# Patient Record
Sex: MALE | Race: WHITE | NOT HISPANIC OR LATINO | URBAN - METROPOLITAN AREA
[De-identification: names, ages, dates, MRNs, and addresses within clinical notes are randomized per-mention and may not be internally consistent; named-entity substitution may affect disease eponyms.]

---

## 2018-01-01 ENCOUNTER — INPATIENT (INPATIENT)
Facility: HOSPITAL | Age: 68
LOS: 11 days | End: 2018-03-20
Attending: INTERNAL MEDICINE

## 2018-01-01 VITALS — OXYGEN SATURATION: 95 % | HEART RATE: 116 BPM

## 2018-01-01 VITALS — RESPIRATION RATE: 6 BRPM

## 2018-01-01 DIAGNOSIS — J18.9 PNEUMONIA, UNSPECIFIED ORGANISM: ICD-10-CM

## 2018-01-01 DIAGNOSIS — N17.9 ACUTE KIDNEY FAILURE, UNSPECIFIED: ICD-10-CM

## 2018-01-01 DIAGNOSIS — C90.00 MULTIPLE MYELOMA NOT HAVING ACHIEVED REMISSION: ICD-10-CM

## 2018-01-01 DIAGNOSIS — D69.6 THROMBOCYTOPENIA, UNSPECIFIED: ICD-10-CM

## 2018-01-01 DIAGNOSIS — D64.9 ANEMIA, UNSPECIFIED: ICD-10-CM

## 2018-01-01 LAB
% ALBUMIN: 25.2 % — SIGNIFICANT CHANGE UP
% ALPHA 1: 6.5 % — SIGNIFICANT CHANGE UP
% ALPHA 2: 9.9 % — SIGNIFICANT CHANGE UP
% BETA: 47.4 % — SIGNIFICANT CHANGE UP
% GAMMA: 11 % — SIGNIFICANT CHANGE UP
% M SPIKE: 37 % — SIGNIFICANT CHANGE UP
ALBUMIN SERPL ELPH-MCNC: 1.4 G/DL — LOW (ref 3–5.5)
ALBUMIN SERPL ELPH-MCNC: 1.5 G/DL — LOW (ref 3–5.5)
ALBUMIN SERPL ELPH-MCNC: 1.5 G/DL — LOW (ref 3–5.5)
ALBUMIN SERPL ELPH-MCNC: 1.6 G/DL — LOW (ref 3–5.5)
ALBUMIN SERPL ELPH-MCNC: 1.6 G/DL — LOW (ref 3–5.5)
ALBUMIN SERPL ELPH-MCNC: 1.7 G/DL — LOW (ref 3.5–5.2)
ALBUMIN SERPL ELPH-MCNC: 1.7 G/DL — LOW (ref 3.5–5.2)
ALBUMIN SERPL ELPH-MCNC: 1.8 G/DL — LOW (ref 3.5–5.2)
ALBUMIN SERPL ELPH-MCNC: 1.8 G/DL — LOW (ref 3.5–5.2)
ALBUMIN SERPL ELPH-MCNC: 1.8 G/DL — LOW (ref 3–5.5)
ALBUMIN SERPL ELPH-MCNC: 1.9 G/DL — LOW (ref 3.5–5.2)
ALBUMIN SERPL ELPH-MCNC: 2 G/DL — LOW (ref 3.5–5.2)
ALBUMIN SERPL ELPH-MCNC: 2 G/DL — LOW (ref 3.6–5.5)
ALBUMIN SERPL ELPH-MCNC: 2.3 G/DL — LOW (ref 3–5.5)
ALBUMIN/GLOB SERPL ELPH: 0.3 RATIO — SIGNIFICANT CHANGE UP
ALP SERPL-CCNC: 32 U/L — SIGNIFICANT CHANGE UP (ref 30–115)
ALP SERPL-CCNC: 35 U/L — SIGNIFICANT CHANGE UP (ref 30–115)
ALP SERPL-CCNC: 44 U/L — SIGNIFICANT CHANGE UP (ref 30–115)
ALP SERPL-CCNC: 46 U/L — SIGNIFICANT CHANGE UP (ref 30–115)
ALP SERPL-CCNC: 49 U/L — SIGNIFICANT CHANGE UP (ref 30–115)
ALP SERPL-CCNC: 51 U/L — SIGNIFICANT CHANGE UP (ref 30–115)
ALP SERPL-CCNC: 57 U/L — SIGNIFICANT CHANGE UP (ref 30–115)
ALP SERPL-CCNC: 58 U/L — SIGNIFICANT CHANGE UP (ref 30–115)
ALP SERPL-CCNC: 58 U/L — SIGNIFICANT CHANGE UP (ref 30–115)
ALP SERPL-CCNC: 67 U/L — SIGNIFICANT CHANGE UP (ref 30–115)
ALP SERPL-CCNC: 68 U/L — SIGNIFICANT CHANGE UP (ref 30–115)
ALP SERPL-CCNC: 70 U/L — SIGNIFICANT CHANGE UP (ref 30–115)
ALP SERPL-CCNC: 72 U/L — SIGNIFICANT CHANGE UP (ref 30–115)
ALPHA1 GLOB SERPL ELPH-MCNC: 0.5 G/DL — HIGH (ref 0.1–0.4)
ALPHA2 GLOB SERPL ELPH-MCNC: 0.8 G/DL — SIGNIFICANT CHANGE UP (ref 0.5–1)
ALT FLD-CCNC: 10 U/L — SIGNIFICANT CHANGE UP (ref 0–41)
ALT FLD-CCNC: 12 U/L — SIGNIFICANT CHANGE UP (ref 0–41)
ALT FLD-CCNC: 13 U/L — SIGNIFICANT CHANGE UP (ref 0–41)
ALT FLD-CCNC: 14 U/L — SIGNIFICANT CHANGE UP (ref 0–41)
ALT FLD-CCNC: 15 U/L — SIGNIFICANT CHANGE UP (ref 0–41)
ALT FLD-CCNC: 22 U/L — SIGNIFICANT CHANGE UP (ref 0–41)
ALT FLD-CCNC: 26 U/L — SIGNIFICANT CHANGE UP (ref 0–41)
ALT FLD-CCNC: 28 U/L — SIGNIFICANT CHANGE UP (ref 0–41)
ALT FLD-CCNC: 30 U/L — SIGNIFICANT CHANGE UP (ref 0–41)
ALT FLD-CCNC: 31 U/L — SIGNIFICANT CHANGE UP (ref 0–41)
ALT FLD-CCNC: 35 U/L — SIGNIFICANT CHANGE UP (ref 0–41)
ANION GAP SERPL CALC-SCNC: 15 MMOL/L — HIGH (ref 7–14)
ANION GAP SERPL CALC-SCNC: 17 MMOL/L — HIGH (ref 7–14)
ANION GAP SERPL CALC-SCNC: 17 MMOL/L — HIGH (ref 7–14)
ANION GAP SERPL CALC-SCNC: 18 MMOL/L — HIGH (ref 7–14)
ANION GAP SERPL CALC-SCNC: 19 MMOL/L — HIGH (ref 7–14)
ANION GAP SERPL CALC-SCNC: 20 MMOL/L — HIGH (ref 7–14)
ANION GAP SERPL CALC-SCNC: 20 MMOL/L — HIGH (ref 7–14)
ANION GAP SERPL CALC-SCNC: 21 MMOL/L — HIGH (ref 7–14)
ANION GAP SERPL CALC-SCNC: 21 MMOL/L — HIGH (ref 7–14)
ANION GAP SERPL CALC-SCNC: 22 MMOL/L — HIGH (ref 7–14)
ANION GAP SERPL CALC-SCNC: 25 MMOL/L — HIGH (ref 7–14)
ANION GAP SERPL CALC-SCNC: 25 MMOL/L — HIGH (ref 7–14)
ANION GAP SERPL CALC-SCNC: 27 MMOL/L — HIGH (ref 7–14)
APPEARANCE UR: (no result)
APPEARANCE UR: (no result)
APTT BLD: 40.3 SEC — HIGH (ref 27–39.2)
APTT BLD: 43.3 SEC — HIGH (ref 27–39.2)
APTT BLD: 43.4 SEC — HIGH (ref 27–39.2)
APTT BLD: 44.6 SEC — HIGH (ref 27–39.2)
APTT BLD: 45 SEC — HIGH (ref 27–39.2)
APTT BLD: 51.3 SEC — HIGH (ref 27–39.2)
APTT BLD: 55.6 SEC — HIGH (ref 27–39.2)
AST SERPL-CCNC: 16 U/L — SIGNIFICANT CHANGE UP (ref 0–41)
AST SERPL-CCNC: 19 U/L — SIGNIFICANT CHANGE UP (ref 0–41)
AST SERPL-CCNC: 19 U/L — SIGNIFICANT CHANGE UP (ref 0–41)
AST SERPL-CCNC: 20 U/L — SIGNIFICANT CHANGE UP (ref 0–41)
AST SERPL-CCNC: 23 U/L — SIGNIFICANT CHANGE UP (ref 0–41)
AST SERPL-CCNC: 26 U/L — SIGNIFICANT CHANGE UP (ref 0–41)
AST SERPL-CCNC: 30 U/L — SIGNIFICANT CHANGE UP (ref 0–41)
AST SERPL-CCNC: 33 U/L — SIGNIFICANT CHANGE UP (ref 0–41)
AST SERPL-CCNC: 34 U/L — SIGNIFICANT CHANGE UP (ref 0–41)
AST SERPL-CCNC: 44 U/L — HIGH (ref 0–41)
AST SERPL-CCNC: 48 U/L — HIGH (ref 0–41)
AST SERPL-CCNC: 52 U/L — HIGH (ref 0–41)
AST SERPL-CCNC: 65 U/L — HIGH (ref 0–41)
B-GLOBULIN SERPL ELPH-MCNC: 3.7 G/DL — HIGH (ref 0.5–1)
B-TYPE NATRIURETIC PEPTIDE BNP RESULT: 623 PG/ML — HIGH (ref 0–99)
BACTERIA # UR AUTO: (no result) /HPF
BASE EXCESS BLDA CALC-SCNC: -0.4 MMOL/L — SIGNIFICANT CHANGE UP (ref -2–2)
BASE EXCESS BLDA CALC-SCNC: -3.4 MMOL/L — LOW (ref -2–2)
BASE EXCESS BLDA CALC-SCNC: -3.8 MMOL/L — LOW (ref -2–2)
BASE EXCESS BLDA CALC-SCNC: -4.2 MMOL/L — LOW (ref -2–2)
BASE EXCESS BLDA CALC-SCNC: -5.4 MMOL/L — LOW (ref -2–2)
BASE EXCESS BLDA CALC-SCNC: -5.4 MMOL/L — LOW (ref -2–2)
BASE EXCESS BLDA CALC-SCNC: -7.1 MMOL/L — LOW (ref -2–2)
BASE EXCESS BLDA CALC-SCNC: -7.5 MMOL/L — LOW (ref -2–2)
BASE EXCESS BLDA CALC-SCNC: -9.4 MMOL/L — LOW (ref -2–2)
BASE EXCESS BLDA CALC-SCNC: 1.4 MMOL/L — SIGNIFICANT CHANGE UP (ref -2–2)
BASOPHILS # BLD AUTO: 0 K/UL — SIGNIFICANT CHANGE UP (ref 0–0.2)
BASOPHILS # BLD AUTO: 0.01 K/UL — SIGNIFICANT CHANGE UP (ref 0–0.2)
BASOPHILS # BLD AUTO: 0.02 K/UL — SIGNIFICANT CHANGE UP (ref 0–0.2)
BASOPHILS # BLD AUTO: 0.03 K/UL — SIGNIFICANT CHANGE UP (ref 0–0.2)
BASOPHILS # BLD AUTO: 0.05 K/UL — SIGNIFICANT CHANGE UP (ref 0–0.2)
BASOPHILS # BLD AUTO: 0.07 K/UL — SIGNIFICANT CHANGE UP (ref 0–0.2)
BASOPHILS # BLD AUTO: 0.1 K/UL — SIGNIFICANT CHANGE UP (ref 0–0.2)
BASOPHILS NFR BLD AUTO: 0 % — SIGNIFICANT CHANGE UP (ref 0–1)
BASOPHILS NFR BLD AUTO: 0.1 % — SIGNIFICANT CHANGE UP (ref 0–1)
BASOPHILS NFR BLD AUTO: 0.2 % — SIGNIFICANT CHANGE UP (ref 0–1)
BASOPHILS NFR BLD AUTO: 0.3 % — SIGNIFICANT CHANGE UP (ref 0–1)
BASOPHILS NFR BLD AUTO: 0.6 % — SIGNIFICANT CHANGE UP (ref 0–1)
BASOPHILS NFR BLD AUTO: 0.6 % — SIGNIFICANT CHANGE UP (ref 0–1)
BILIRUB DIRECT SERPL-MCNC: 0.2 MG/DL — SIGNIFICANT CHANGE UP (ref 0–0.2)
BILIRUB INDIRECT FLD-MCNC: 0.8 MG/DL — SIGNIFICANT CHANGE UP
BILIRUB SERPL-MCNC: 0.5 MG/DL — SIGNIFICANT CHANGE UP (ref 0.2–1.2)
BILIRUB SERPL-MCNC: 0.6 MG/DL — SIGNIFICANT CHANGE UP (ref 0.2–1.2)
BILIRUB SERPL-MCNC: 0.8 MG/DL — SIGNIFICANT CHANGE UP (ref 0.2–1.2)
BILIRUB SERPL-MCNC: 1 MG/DL — SIGNIFICANT CHANGE UP (ref 0.2–1.2)
BILIRUB SERPL-MCNC: 1 MG/DL — SIGNIFICANT CHANGE UP (ref 0.2–1.2)
BILIRUB SERPL-MCNC: 1.1 MG/DL — SIGNIFICANT CHANGE UP (ref 0.2–1.2)
BILIRUB SERPL-MCNC: 1.3 MG/DL — HIGH (ref 0.2–1.2)
BILIRUB SERPL-MCNC: 1.5 MG/DL — HIGH (ref 0.2–1.2)
BILIRUB SERPL-MCNC: 1.6 MG/DL — HIGH (ref 0.2–1.2)
BILIRUB SERPL-MCNC: 1.9 MG/DL — HIGH (ref 0.2–1.2)
BILIRUB SERPL-MCNC: 2.1 MG/DL — HIGH (ref 0.2–1.2)
BILIRUB UR-MCNC: (no result)
BILIRUB UR-MCNC: NEGATIVE — SIGNIFICANT CHANGE UP
BLD GP AB SCN SERPL QL: SIGNIFICANT CHANGE UP
BLD GP AB SCN SERPL QL: SIGNIFICANT CHANGE UP
BUN SERPL-MCNC: 103 MG/DL — CRITICAL HIGH (ref 10–20)
BUN SERPL-MCNC: 111 MG/DL — CRITICAL HIGH (ref 10–20)
BUN SERPL-MCNC: 111 MG/DL — CRITICAL HIGH (ref 10–20)
BUN SERPL-MCNC: 112 MG/DL — CRITICAL HIGH (ref 10–20)
BUN SERPL-MCNC: 118 MG/DL — CRITICAL HIGH (ref 10–20)
BUN SERPL-MCNC: 124 MG/DL — CRITICAL HIGH (ref 10–20)
BUN SERPL-MCNC: 125 MG/DL — CRITICAL HIGH (ref 10–20)
BUN SERPL-MCNC: 126 MG/DL — CRITICAL HIGH (ref 10–20)
BUN SERPL-MCNC: 126 MG/DL — CRITICAL HIGH (ref 10–20)
BUN SERPL-MCNC: 136 MG/DL — CRITICAL HIGH (ref 10–20)
BUN SERPL-MCNC: 138 MG/DL — CRITICAL HIGH (ref 10–20)
BUN SERPL-MCNC: 145 MG/DL — CRITICAL HIGH (ref 10–20)
BUN SERPL-MCNC: 147 MG/DL — CRITICAL HIGH (ref 10–20)
BUN SERPL-MCNC: 159 MG/DL — CRITICAL HIGH (ref 10–20)
BUN SERPL-MCNC: 196 MG/DL — CRITICAL HIGH (ref 10–20)
BUN SERPL-MCNC: 79 MG/DL — CRITICAL HIGH (ref 10–20)
BUN SERPL-MCNC: >112 MG/DL — CRITICAL HIGH (ref 10–20)
CALCIUM SERPL-MCNC: 10.6 MG/DL — HIGH (ref 8.5–10.1)
CALCIUM SERPL-MCNC: 6.4 MG/DL — LOW (ref 8.5–10.1)
CALCIUM SERPL-MCNC: 6.5 MG/DL — LOW (ref 8.5–10.1)
CALCIUM SERPL-MCNC: 6.6 MG/DL — LOW (ref 8.5–10.1)
CALCIUM SERPL-MCNC: 6.9 MG/DL — LOW (ref 8.5–10.1)
CALCIUM SERPL-MCNC: 7 MG/DL — LOW (ref 8.5–10.1)
CALCIUM SERPL-MCNC: 7.2 MG/DL — LOW (ref 8.5–10.1)
CALCIUM SERPL-MCNC: 7.2 MG/DL — LOW (ref 8.5–10.1)
CALCIUM SERPL-MCNC: 7.4 MG/DL — LOW (ref 8.5–10.1)
CALCIUM SERPL-MCNC: 7.5 MG/DL — LOW (ref 8.5–10.1)
CALCIUM SERPL-MCNC: 7.6 MG/DL — LOW (ref 8.5–10.1)
CALCIUM SERPL-MCNC: 8.4 MG/DL — LOW (ref 8.5–10.1)
CALCIUM SERPL-MCNC: 8.8 MG/DL — SIGNIFICANT CHANGE UP (ref 8.5–10.1)
CALCIUM SERPL-MCNC: 9.1 MG/DL — SIGNIFICANT CHANGE UP (ref 8.5–10.1)
CALCIUM SERPL-MCNC: 9.5 MG/DL — SIGNIFICANT CHANGE UP (ref 8.5–10.1)
CALCIUM SERPL-MCNC: 9.7 MG/DL — SIGNIFICANT CHANGE UP (ref 8.5–10.1)
CALCIUM SERPL-MCNC: 9.9 MG/DL — SIGNIFICANT CHANGE UP (ref 8.5–10.1)
CHLORIDE SERPL-SCNC: 100 MMOL/L — SIGNIFICANT CHANGE UP (ref 98–110)
CHLORIDE SERPL-SCNC: 100 MMOL/L — SIGNIFICANT CHANGE UP (ref 98–110)
CHLORIDE SERPL-SCNC: 101 MMOL/L — SIGNIFICANT CHANGE UP (ref 98–110)
CHLORIDE SERPL-SCNC: 103 MMOL/L — SIGNIFICANT CHANGE UP (ref 98–110)
CHLORIDE SERPL-SCNC: 106 MMOL/L — SIGNIFICANT CHANGE UP (ref 98–110)
CHLORIDE SERPL-SCNC: 93 MMOL/L — LOW (ref 98–110)
CHLORIDE SERPL-SCNC: 94 MMOL/L — LOW (ref 98–110)
CHLORIDE SERPL-SCNC: 95 MMOL/L — LOW (ref 98–110)
CHLORIDE SERPL-SCNC: 96 MMOL/L — LOW (ref 98–110)
CHLORIDE SERPL-SCNC: 97 MMOL/L — LOW (ref 98–110)
CHLORIDE SERPL-SCNC: 97 MMOL/L — LOW (ref 98–110)
CHLORIDE SERPL-SCNC: 98 MMOL/L — SIGNIFICANT CHANGE UP (ref 98–110)
CK MB BLD-MCNC: 1 % — SIGNIFICANT CHANGE UP (ref 0–4)
CK MB CFR SERPL CALC: 3.4 NG/ML — SIGNIFICANT CHANGE UP (ref 0.6–6.3)
CK SERPL-CCNC: 157 U/L — SIGNIFICANT CHANGE UP (ref 0–225)
CK SERPL-CCNC: 280 U/L — HIGH (ref 0–225)
CO2 SERPL-SCNC: 15 MMOL/L — LOW (ref 17–32)
CO2 SERPL-SCNC: 15 MMOL/L — LOW (ref 17–32)
CO2 SERPL-SCNC: 17 MMOL/L — SIGNIFICANT CHANGE UP (ref 17–32)
CO2 SERPL-SCNC: 17 MMOL/L — SIGNIFICANT CHANGE UP (ref 17–32)
CO2 SERPL-SCNC: 18 MMOL/L — SIGNIFICANT CHANGE UP (ref 17–32)
CO2 SERPL-SCNC: 18 MMOL/L — SIGNIFICANT CHANGE UP (ref 17–32)
CO2 SERPL-SCNC: 19 MMOL/L — SIGNIFICANT CHANGE UP (ref 17–32)
CO2 SERPL-SCNC: 20 MMOL/L — SIGNIFICANT CHANGE UP (ref 17–32)
CO2 SERPL-SCNC: 21 MMOL/L — SIGNIFICANT CHANGE UP (ref 17–32)
CO2 SERPL-SCNC: 22 MMOL/L — SIGNIFICANT CHANGE UP (ref 17–32)
CO2 SERPL-SCNC: 23 MMOL/L — SIGNIFICANT CHANGE UP (ref 17–32)
CO2 SERPL-SCNC: 24 MMOL/L — SIGNIFICANT CHANGE UP (ref 17–32)
COLOR SPEC: (no result)
COLOR SPEC: SIGNIFICANT CHANGE UP
CREAT SERPL-MCNC: 5.6 MG/DL — CRITICAL HIGH (ref 0.7–1.5)
CREAT SERPL-MCNC: 6.1 MG/DL — CRITICAL HIGH (ref 0.7–1.5)
CREAT SERPL-MCNC: 6.6 MG/DL — CRITICAL HIGH (ref 0.7–1.5)
CREAT SERPL-MCNC: 6.9 MG/DL — CRITICAL HIGH (ref 0.7–1.5)
CREAT SERPL-MCNC: 7 MG/DL — CRITICAL HIGH (ref 0.7–1.5)
CREAT SERPL-MCNC: 7.2 MG/DL — CRITICAL HIGH (ref 0.7–1.5)
CREAT SERPL-MCNC: 7.2 MG/DL — CRITICAL HIGH (ref 0.7–1.5)
CREAT SERPL-MCNC: 7.4 MG/DL — CRITICAL HIGH (ref 0.7–1.5)
CREAT SERPL-MCNC: 7.5 MG/DL — CRITICAL HIGH (ref 0.7–1.5)
CREAT SERPL-MCNC: 7.5 MG/DL — CRITICAL HIGH (ref 0.7–1.5)
CREAT SERPL-MCNC: 7.6 MG/DL — CRITICAL HIGH (ref 0.7–1.5)
CREAT SERPL-MCNC: 8.1 MG/DL — CRITICAL HIGH (ref 0.7–1.5)
CREAT SERPL-MCNC: 8.2 MG/DL — CRITICAL HIGH (ref 0.7–1.5)
CREAT SERPL-MCNC: 8.3 MG/DL — CRITICAL HIGH (ref 0.7–1.5)
CREAT SERPL-MCNC: 8.5 MG/DL — CRITICAL HIGH (ref 0.7–1.5)
CREAT SERPL-MCNC: 8.9 MG/DL — CRITICAL HIGH (ref 0.7–1.5)
CREAT SERPL-MCNC: 9.3 MG/DL — CRITICAL HIGH (ref 0.7–1.5)
CULTURE RESULTS: NO GROWTH — SIGNIFICANT CHANGE UP
CULTURE RESULTS: SIGNIFICANT CHANGE UP
D DIMER BLD IA.RAPID-MCNC: 1490 NG/ML DDU — HIGH (ref 0–230)
D DIMER BLD IA.RAPID-MCNC: 6189 NG/ML DDU — HIGH (ref 0–230)
DIFF PNL FLD: (no result)
DIFF PNL FLD: NEGATIVE — SIGNIFICANT CHANGE UP
EOSINOPHIL # BLD AUTO: 0 K/UL — SIGNIFICANT CHANGE UP (ref 0–0.7)
EOSINOPHIL # BLD AUTO: 0.01 K/UL — SIGNIFICANT CHANGE UP (ref 0–0.7)
EOSINOPHIL # BLD AUTO: 0.04 K/UL — SIGNIFICANT CHANGE UP (ref 0–0.7)
EOSINOPHIL # BLD AUTO: 0.05 K/UL — SIGNIFICANT CHANGE UP (ref 0–0.7)
EOSINOPHIL # BLD AUTO: 0.07 K/UL — SIGNIFICANT CHANGE UP (ref 0–0.7)
EOSINOPHIL # BLD AUTO: 0.08 K/UL — SIGNIFICANT CHANGE UP (ref 0–0.7)
EOSINOPHIL NFR BLD AUTO: 0 % — SIGNIFICANT CHANGE UP (ref 0–8)
EOSINOPHIL NFR BLD AUTO: 0.1 % — SIGNIFICANT CHANGE UP (ref 0–8)
EOSINOPHIL NFR BLD AUTO: 0.5 % — SIGNIFICANT CHANGE UP (ref 0–8)
EOSINOPHIL NFR BLD AUTO: 0.5 % — SIGNIFICANT CHANGE UP (ref 0–8)
EOSINOPHIL NFR BLD AUTO: 0.7 % — SIGNIFICANT CHANGE UP (ref 0–8)
EOSINOPHIL NFR BLD AUTO: 1.1 % — SIGNIFICANT CHANGE UP (ref 0–8)
EOSINOPHIL NFR BLD AUTO: 4 % — SIGNIFICANT CHANGE UP (ref 0–8)
EPI CELLS # UR: (no result) /HPF
FERRITIN SERPL-MCNC: 1559 NG/ML — HIGH (ref 30–400)
FIBRINOGEN PPP-MCNC: 297 MG/DL — SIGNIFICANT CHANGE UP (ref 204.4–570.6)
FIBRINOGEN PPP-MCNC: 567 MG/DL — SIGNIFICANT CHANGE UP (ref 204.4–570.6)
FLU A RESULT: NEGATIVE — SIGNIFICANT CHANGE UP
FLU A RESULT: NEGATIVE — SIGNIFICANT CHANGE UP
FLUAV AG NPH QL: NEGATIVE — SIGNIFICANT CHANGE UP
FLUBV AG NPH QL: NEGATIVE — SIGNIFICANT CHANGE UP
FOLATE SERPL-MCNC: 13.9 NG/ML — SIGNIFICANT CHANGE UP (ref 4.8–24.2)
GAMMA GLOBULIN: 0.9 G/DL — SIGNIFICANT CHANGE UP (ref 0.6–1.6)
GAS PNL BLDA: SIGNIFICANT CHANGE UP
GAS PNL BLDA: SIGNIFICANT CHANGE UP
GAS PNL BLDV: SIGNIFICANT CHANGE UP
GLUCOSE SERPL-MCNC: 101 MG/DL — SIGNIFICANT CHANGE UP (ref 70–110)
GLUCOSE SERPL-MCNC: 104 MG/DL — SIGNIFICANT CHANGE UP (ref 70–110)
GLUCOSE SERPL-MCNC: 106 MG/DL — SIGNIFICANT CHANGE UP (ref 70–110)
GLUCOSE SERPL-MCNC: 120 MG/DL — HIGH (ref 70–110)
GLUCOSE SERPL-MCNC: 126 MG/DL — HIGH (ref 70–110)
GLUCOSE SERPL-MCNC: 129 MG/DL — HIGH (ref 70–110)
GLUCOSE SERPL-MCNC: 131 MG/DL — HIGH (ref 70–110)
GLUCOSE SERPL-MCNC: 147 MG/DL — HIGH (ref 70–110)
GLUCOSE SERPL-MCNC: 149 MG/DL — HIGH (ref 70–110)
GLUCOSE SERPL-MCNC: 72 MG/DL — SIGNIFICANT CHANGE UP (ref 70–110)
GLUCOSE SERPL-MCNC: 80 MG/DL — SIGNIFICANT CHANGE UP (ref 70–110)
GLUCOSE SERPL-MCNC: 85 MG/DL — SIGNIFICANT CHANGE UP (ref 70–110)
GLUCOSE SERPL-MCNC: 87 MG/DL — SIGNIFICANT CHANGE UP (ref 70–110)
GLUCOSE SERPL-MCNC: 94 MG/DL — SIGNIFICANT CHANGE UP (ref 70–110)
GLUCOSE SERPL-MCNC: 95 MG/DL — SIGNIFICANT CHANGE UP (ref 70–110)
GLUCOSE SERPL-MCNC: 97 MG/DL — SIGNIFICANT CHANGE UP (ref 70–110)
GLUCOSE SERPL-MCNC: 98 MG/DL — SIGNIFICANT CHANGE UP (ref 70–110)
GLUCOSE UR QL: 100 MG/DL
GLUCOSE UR QL: NEGATIVE MG/DL — SIGNIFICANT CHANGE UP
HAPTOGLOB SERPL-MCNC: 214 MG/DL — HIGH (ref 34–200)
HBV CORE AB SER-ACNC: SIGNIFICANT CHANGE UP
HBV SURFACE AB SER-ACNC: SIGNIFICANT CHANGE UP
HBV SURFACE AG SER-ACNC: SIGNIFICANT CHANGE UP
HCO3 BLDA-SCNC: 17 MMOL/L — LOW (ref 23–27)
HCO3 BLDA-SCNC: 19 MMOL/L — LOW (ref 23–27)
HCO3 BLDA-SCNC: 19 MMOL/L — LOW (ref 23–27)
HCO3 BLDA-SCNC: 20 MMOL/L — LOW (ref 23–27)
HCO3 BLDA-SCNC: 21 MMOL/L — LOW (ref 23–27)
HCO3 BLDA-SCNC: 22 MMOL/L — LOW (ref 23–27)
HCO3 BLDA-SCNC: 22 MMOL/L — LOW (ref 23–27)
HCO3 BLDA-SCNC: 23 MMOL/L — SIGNIFICANT CHANGE UP (ref 23–27)
HCO3 BLDA-SCNC: 24 MMOL/L — SIGNIFICANT CHANGE UP (ref 23–27)
HCO3 BLDA-SCNC: 26 MMOL/L — SIGNIFICANT CHANGE UP (ref 23–27)
HCT VFR BLD CALC: 14.9 % — LOW (ref 42–52)
HCT VFR BLD CALC: 15.3 % — LOW (ref 42–52)
HCT VFR BLD CALC: 16.2 % — LOW (ref 42–52)
HCT VFR BLD CALC: 19.3 % — LOW (ref 42–52)
HCT VFR BLD CALC: 20.1 % — LOW (ref 42–52)
HCT VFR BLD CALC: 20.6 % — LOW (ref 42–52)
HCT VFR BLD CALC: 20.8 % — LOW (ref 42–52)
HCT VFR BLD CALC: 21.1 % — LOW (ref 42–52)
HCT VFR BLD CALC: 21.4 % — LOW (ref 42–52)
HCT VFR BLD CALC: 21.4 % — LOW (ref 42–52)
HCT VFR BLD CALC: 21.8 % — LOW (ref 42–52)
HCT VFR BLD CALC: 22.8 % — LOW (ref 42–52)
HCT VFR BLD CALC: 22.9 % — LOW (ref 42–52)
HCT VFR BLD CALC: 23.4 % — LOW (ref 42–52)
HCT VFR BLD CALC: 23.6 % — LOW (ref 42–52)
HCT VFR BLD CALC: 24.1 % — LOW (ref 42–52)
HCT VFR BLD CALC: 26.3 % — LOW (ref 42–52)
HCV AB S/CO SERPL IA: 0.03 S/CO — SIGNIFICANT CHANGE UP
HCV AB SERPL-IMP: SIGNIFICANT CHANGE UP
HGB BLD-MCNC: 4.4 G/DL — CRITICAL LOW (ref 14–18)
HGB BLD-MCNC: 4.6 G/DL — CRITICAL LOW (ref 14–18)
HGB BLD-MCNC: 5.3 G/DL — CRITICAL LOW (ref 14–18)
HGB BLD-MCNC: 6.1 G/DL — CRITICAL LOW (ref 14–18)
HGB BLD-MCNC: 6.2 G/DL — CRITICAL LOW (ref 14–18)
HGB BLD-MCNC: 6.4 G/DL — CRITICAL LOW (ref 14–18)
HGB BLD-MCNC: 6.6 G/DL — CRITICAL LOW (ref 14–18)
HGB BLD-MCNC: 6.8 G/DL — CRITICAL LOW (ref 14–18)
HGB BLD-MCNC: 6.9 G/DL — CRITICAL LOW (ref 14–18)
HGB BLD-MCNC: 7 G/DL — CRITICAL LOW (ref 14–18)
HGB BLD-MCNC: 7.1 G/DL — CRITICAL LOW (ref 14–18)
HGB BLD-MCNC: 7.3 G/DL — CRITICAL LOW (ref 14–18)
HGB BLD-MCNC: 7.4 G/DL — CRITICAL LOW (ref 14–18)
HGB BLD-MCNC: 7.4 G/DL — CRITICAL LOW (ref 14–18)
HGB BLD-MCNC: 7.7 G/DL — LOW (ref 14–18)
HGB BLD-MCNC: 7.9 G/DL — LOW (ref 14–18)
HGB BLD-MCNC: 8.8 G/DL — LOW (ref 14–18)
HOROWITZ INDEX BLDA+IHG-RTO: 40 — SIGNIFICANT CHANGE UP
HOROWITZ INDEX BLDA+IHG-RTO: 50 — SIGNIFICANT CHANGE UP
IMM GRANULOCYTES NFR BLD AUTO: 1 % — HIGH (ref 0.1–0.3)
IMM GRANULOCYTES NFR BLD AUTO: 1.3 % — HIGH (ref 0.1–0.3)
IMM GRANULOCYTES NFR BLD AUTO: 2 % — HIGH (ref 0.1–0.3)
IMM GRANULOCYTES NFR BLD AUTO: 2.4 % — HIGH (ref 0.1–0.3)
IMM GRANULOCYTES NFR BLD AUTO: 3 % — HIGH (ref 0.1–0.3)
IMM GRANULOCYTES NFR BLD AUTO: 3.1 % — HIGH (ref 0.1–0.3)
IMM GRANULOCYTES NFR BLD AUTO: 3.1 % — HIGH (ref 0.1–0.3)
IMM GRANULOCYTES NFR BLD AUTO: 3.7 % — HIGH (ref 0.1–0.3)
IMM GRANULOCYTES NFR BLD AUTO: 4 % — HIGH (ref 0.1–0.3)
IMM GRANULOCYTES NFR BLD AUTO: 4.6 % — HIGH (ref 0.1–0.3)
IMM GRANULOCYTES NFR BLD AUTO: 4.7 % — HIGH (ref 0.1–0.3)
IMM GRANULOCYTES NFR BLD AUTO: 4.9 % — HIGH (ref 0.1–0.3)
IMM GRANULOCYTES NFR BLD AUTO: 6.1 % — HIGH (ref 0.1–0.3)
IMM GRANULOCYTES NFR BLD AUTO: 6.5 % — HIGH (ref 0.1–0.3)
IMM GRANULOCYTES NFR BLD AUTO: 7.1 % — HIGH (ref 0.1–0.3)
INR BLD: 1.21 RATIO — SIGNIFICANT CHANGE UP (ref 0.65–1.3)
INR BLD: 1.24 RATIO — SIGNIFICANT CHANGE UP (ref 0.65–1.3)
INR BLD: 1.37 RATIO — HIGH (ref 0.65–1.3)
INR BLD: 1.37 RATIO — HIGH (ref 0.65–1.3)
INR BLD: 1.43 RATIO — HIGH (ref 0.65–1.3)
INR BLD: 1.54 RATIO — HIGH (ref 0.65–1.3)
INR BLD: 1.62 RATIO — HIGH (ref 0.65–1.3)
INTERPRETATION SERPL IFE-IMP: SIGNIFICANT CHANGE UP
IRON SATN MFR SERPL: 21 % — SIGNIFICANT CHANGE UP (ref 15–50)
IRON SATN MFR SERPL: 28 UG/DL — LOW (ref 35–150)
KAPPA LC SER QL IFE: 311 MG/DL — HIGH (ref 0.33–1.94)
KAPPA/LAMBDA FREE LIGHT CHAIN RATIO, SERUM: 777.5 RATIO — HIGH (ref 0.26–1.65)
KETONES UR-MCNC: NEGATIVE — SIGNIFICANT CHANGE UP
KETONES UR-MCNC: NEGATIVE — SIGNIFICANT CHANGE UP
LACTATE SERPL-SCNC: 4 MMOL/L — CRITICAL HIGH (ref 0.5–2.2)
LAMBDA LC SER QL IFE: 0.4 MG/DL — LOW (ref 0.57–2.63)
LDH SERPL L TO P-CCNC: 313 U/L — HIGH (ref 60–200)
LEGIONELLA AG UR QL: NEGATIVE — SIGNIFICANT CHANGE UP
LEUKOCYTE ESTERASE UR-ACNC: (no result)
LEUKOCYTE ESTERASE UR-ACNC: NEGATIVE — SIGNIFICANT CHANGE UP
LYMPHOCYTES # BLD AUTO: 0.24 K/UL — LOW (ref 1.2–3.4)
LYMPHOCYTES # BLD AUTO: 0.3 K/UL — LOW (ref 1.2–3.4)
LYMPHOCYTES # BLD AUTO: 0.38 K/UL — LOW (ref 1.2–3.4)
LYMPHOCYTES # BLD AUTO: 0.38 K/UL — LOW (ref 1.2–3.4)
LYMPHOCYTES # BLD AUTO: 0.39 K/UL — LOW (ref 1.2–3.4)
LYMPHOCYTES # BLD AUTO: 0.4 K/UL — LOW (ref 1.2–3.4)
LYMPHOCYTES # BLD AUTO: 0.45 K/UL — LOW (ref 1.2–3.4)
LYMPHOCYTES # BLD AUTO: 0.45 K/UL — LOW (ref 1.2–3.4)
LYMPHOCYTES # BLD AUTO: 0.49 K/UL — LOW (ref 1.2–3.4)
LYMPHOCYTES # BLD AUTO: 0.5 K/UL — LOW (ref 1.2–3.4)
LYMPHOCYTES # BLD AUTO: 0.52 K/UL — LOW (ref 1.2–3.4)
LYMPHOCYTES # BLD AUTO: 0.57 K/UL — LOW (ref 1.2–3.4)
LYMPHOCYTES # BLD AUTO: 0.69 K/UL — LOW (ref 1.2–3.4)
LYMPHOCYTES # BLD AUTO: 0.7 K/UL — LOW (ref 1.2–3.4)
LYMPHOCYTES # BLD AUTO: 0.7 K/UL — LOW (ref 1.2–3.4)
LYMPHOCYTES # BLD AUTO: 0.86 K/UL — LOW (ref 1.2–3.4)
LYMPHOCYTES # BLD AUTO: 2 % — LOW (ref 20.5–51.1)
LYMPHOCYTES # BLD AUTO: 2.2 % — LOW (ref 20.5–51.1)
LYMPHOCYTES # BLD AUTO: 2.4 % — LOW (ref 20.5–51.1)
LYMPHOCYTES # BLD AUTO: 2.5 % — LOW (ref 20.5–51.1)
LYMPHOCYTES # BLD AUTO: 3.1 % — LOW (ref 20.5–51.1)
LYMPHOCYTES # BLD AUTO: 3.1 % — LOW (ref 20.5–51.1)
LYMPHOCYTES # BLD AUTO: 3.3 % — LOW (ref 20.5–51.1)
LYMPHOCYTES # BLD AUTO: 3.7 % — LOW (ref 20.5–51.1)
LYMPHOCYTES # BLD AUTO: 32 % — SIGNIFICANT CHANGE UP (ref 20.5–51.1)
LYMPHOCYTES # BLD AUTO: 4.2 % — LOW (ref 20.5–51.1)
LYMPHOCYTES # BLD AUTO: 4.2 % — LOW (ref 20.5–51.1)
LYMPHOCYTES # BLD AUTO: 4.4 % — LOW (ref 20.5–51.1)
LYMPHOCYTES # BLD AUTO: 5.3 % — LOW (ref 20.5–51.1)
LYMPHOCYTES # BLD AUTO: 5.4 % — LOW (ref 20.5–51.1)
LYMPHOCYTES # BLD AUTO: 7.3 % — LOW (ref 20.5–51.1)
LYMPHOCYTES # BLD AUTO: 9.9 % — LOW (ref 20.5–51.1)
M-SPIKE: 2.9 G/DL — HIGH (ref 0–0)
MAGNESIUM SERPL-MCNC: 2.1 MG/DL — SIGNIFICANT CHANGE UP (ref 1.8–2.4)
MAGNESIUM SERPL-MCNC: 2.2 MG/DL — SIGNIFICANT CHANGE UP (ref 1.8–2.4)
MAGNESIUM SERPL-MCNC: 2.3 MG/DL — SIGNIFICANT CHANGE UP (ref 1.8–2.4)
MAGNESIUM SERPL-MCNC: 2.4 MG/DL — SIGNIFICANT CHANGE UP (ref 1.8–2.4)
MAGNESIUM SERPL-MCNC: 2.6 MG/DL — HIGH (ref 1.8–2.4)
MAGNESIUM SERPL-MCNC: 2.6 MG/DL — HIGH (ref 1.8–2.4)
MAGNESIUM SERPL-MCNC: 2.7 MG/DL — HIGH (ref 1.8–2.4)
MAGNESIUM SERPL-MCNC: 2.8 MG/DL — HIGH (ref 1.8–2.4)
MAGNESIUM SERPL-MCNC: 2.9 MG/DL — HIGH (ref 1.8–2.4)
MAGNESIUM SERPL-MCNC: 3.1 MG/DL — CRITICAL HIGH (ref 1.8–2.4)
MCHC RBC-ENTMCNC: 28.6 PG — SIGNIFICANT CHANGE UP (ref 27–31)
MCHC RBC-ENTMCNC: 28.6 PG — SIGNIFICANT CHANGE UP (ref 27–31)
MCHC RBC-ENTMCNC: 28.7 PG — SIGNIFICANT CHANGE UP (ref 27–31)
MCHC RBC-ENTMCNC: 28.8 PG — SIGNIFICANT CHANGE UP (ref 27–31)
MCHC RBC-ENTMCNC: 28.9 PG — SIGNIFICANT CHANGE UP (ref 27–31)
MCHC RBC-ENTMCNC: 29 PG — SIGNIFICANT CHANGE UP (ref 27–31)
MCHC RBC-ENTMCNC: 29 PG — SIGNIFICANT CHANGE UP (ref 27–31)
MCHC RBC-ENTMCNC: 29.2 PG — SIGNIFICANT CHANGE UP (ref 27–31)
MCHC RBC-ENTMCNC: 29.3 PG — SIGNIFICANT CHANGE UP (ref 27–31)
MCHC RBC-ENTMCNC: 29.3 PG — SIGNIFICANT CHANGE UP (ref 27–31)
MCHC RBC-ENTMCNC: 29.4 PG — SIGNIFICANT CHANGE UP (ref 27–31)
MCHC RBC-ENTMCNC: 29.4 PG — SIGNIFICANT CHANGE UP (ref 27–31)
MCHC RBC-ENTMCNC: 29.5 G/DL — LOW (ref 32–37)
MCHC RBC-ENTMCNC: 30.1 G/DL — LOW (ref 32–37)
MCHC RBC-ENTMCNC: 30.8 G/DL — LOW (ref 32–37)
MCHC RBC-ENTMCNC: 31.1 G/DL — LOW (ref 32–37)
MCHC RBC-ENTMCNC: 31.6 G/DL — LOW (ref 32–37)
MCHC RBC-ENTMCNC: 31.6 G/DL — LOW (ref 32–37)
MCHC RBC-ENTMCNC: 31.7 G/DL — LOW (ref 32–37)
MCHC RBC-ENTMCNC: 31.9 G/DL — LOW (ref 32–37)
MCHC RBC-ENTMCNC: 32.2 G/DL — SIGNIFICANT CHANGE UP (ref 32–37)
MCHC RBC-ENTMCNC: 32.2 G/DL — SIGNIFICANT CHANGE UP (ref 32–37)
MCHC RBC-ENTMCNC: 32.5 G/DL — SIGNIFICANT CHANGE UP (ref 32–37)
MCHC RBC-ENTMCNC: 32.6 G/DL — SIGNIFICANT CHANGE UP (ref 32–37)
MCHC RBC-ENTMCNC: 32.6 G/DL — SIGNIFICANT CHANGE UP (ref 32–37)
MCHC RBC-ENTMCNC: 32.7 G/DL — SIGNIFICANT CHANGE UP (ref 32–37)
MCHC RBC-ENTMCNC: 32.7 G/DL — SIGNIFICANT CHANGE UP (ref 32–37)
MCHC RBC-ENTMCNC: 32.8 G/DL — SIGNIFICANT CHANGE UP (ref 32–37)
MCHC RBC-ENTMCNC: 33.5 G/DL — SIGNIFICANT CHANGE UP (ref 32–37)
MCV RBC AUTO: 86.2 FL — SIGNIFICANT CHANGE UP (ref 80–94)
MCV RBC AUTO: 88 FL — SIGNIFICANT CHANGE UP (ref 80–94)
MCV RBC AUTO: 88 FL — SIGNIFICANT CHANGE UP (ref 80–94)
MCV RBC AUTO: 89.2 FL — SIGNIFICANT CHANGE UP (ref 80–94)
MCV RBC AUTO: 89.9 FL — SIGNIFICANT CHANGE UP (ref 80–94)
MCV RBC AUTO: 90 FL — SIGNIFICANT CHANGE UP (ref 80–94)
MCV RBC AUTO: 90.1 FL — SIGNIFICANT CHANGE UP (ref 80–94)
MCV RBC AUTO: 90.1 FL — SIGNIFICANT CHANGE UP (ref 80–94)
MCV RBC AUTO: 90.2 FL — SIGNIFICANT CHANGE UP (ref 80–94)
MCV RBC AUTO: 90.5 FL — SIGNIFICANT CHANGE UP (ref 80–94)
MCV RBC AUTO: 90.9 FL — SIGNIFICANT CHANGE UP (ref 80–94)
MCV RBC AUTO: 91 FL — SIGNIFICANT CHANGE UP (ref 80–94)
MCV RBC AUTO: 91.8 FL — SIGNIFICANT CHANGE UP (ref 80–94)
MCV RBC AUTO: 92.6 FL — SIGNIFICANT CHANGE UP (ref 80–94)
MCV RBC AUTO: 92.8 FL — SIGNIFICANT CHANGE UP (ref 80–94)
MCV RBC AUTO: 95.6 FL — HIGH (ref 80–94)
MCV RBC AUTO: 97.4 FL — HIGH (ref 80–94)
MONOCYTES # BLD AUTO: 0.11 K/UL — SIGNIFICANT CHANGE UP (ref 0.1–0.6)
MONOCYTES # BLD AUTO: 0.14 K/UL — SIGNIFICANT CHANGE UP (ref 0.1–0.6)
MONOCYTES # BLD AUTO: 0.16 K/UL — SIGNIFICANT CHANGE UP (ref 0.1–0.6)
MONOCYTES # BLD AUTO: 0.18 K/UL — SIGNIFICANT CHANGE UP (ref 0.1–0.6)
MONOCYTES # BLD AUTO: 0.22 K/UL — SIGNIFICANT CHANGE UP (ref 0.1–0.6)
MONOCYTES # BLD AUTO: 0.24 K/UL — SIGNIFICANT CHANGE UP (ref 0.1–0.6)
MONOCYTES # BLD AUTO: 0.25 K/UL — SIGNIFICANT CHANGE UP (ref 0.1–0.6)
MONOCYTES # BLD AUTO: 0.26 K/UL — SIGNIFICANT CHANGE UP (ref 0.1–0.6)
MONOCYTES # BLD AUTO: 0.28 K/UL — SIGNIFICANT CHANGE UP (ref 0.1–0.6)
MONOCYTES # BLD AUTO: 0.29 K/UL — SIGNIFICANT CHANGE UP (ref 0.1–0.6)
MONOCYTES # BLD AUTO: 0.29 K/UL — SIGNIFICANT CHANGE UP (ref 0.1–0.6)
MONOCYTES # BLD AUTO: 0.3 K/UL — SIGNIFICANT CHANGE UP (ref 0.1–0.6)
MONOCYTES # BLD AUTO: 0.33 K/UL — SIGNIFICANT CHANGE UP (ref 0.1–0.6)
MONOCYTES # BLD AUTO: 0.35 K/UL — SIGNIFICANT CHANGE UP (ref 0.1–0.6)
MONOCYTES # BLD AUTO: 0.92 K/UL — HIGH (ref 0.1–0.6)
MONOCYTES NFR BLD AUTO: 0.6 % — LOW (ref 1.7–9.3)
MONOCYTES NFR BLD AUTO: 1.1 % — LOW (ref 1.7–9.3)
MONOCYTES NFR BLD AUTO: 1.1 % — LOW (ref 1.7–9.3)
MONOCYTES NFR BLD AUTO: 1.6 % — LOW (ref 1.7–9.3)
MONOCYTES NFR BLD AUTO: 1.6 % — LOW (ref 1.7–9.3)
MONOCYTES NFR BLD AUTO: 2.1 % — SIGNIFICANT CHANGE UP (ref 1.7–9.3)
MONOCYTES NFR BLD AUTO: 2.1 % — SIGNIFICANT CHANGE UP (ref 1.7–9.3)
MONOCYTES NFR BLD AUTO: 2.2 % — SIGNIFICANT CHANGE UP (ref 1.7–9.3)
MONOCYTES NFR BLD AUTO: 2.5 % — SIGNIFICANT CHANGE UP (ref 1.7–9.3)
MONOCYTES NFR BLD AUTO: 2.5 % — SIGNIFICANT CHANGE UP (ref 1.7–9.3)
MONOCYTES NFR BLD AUTO: 2.8 % — SIGNIFICANT CHANGE UP (ref 1.7–9.3)
MONOCYTES NFR BLD AUTO: 2.9 % — SIGNIFICANT CHANGE UP (ref 1.7–9.3)
MONOCYTES NFR BLD AUTO: 3.1 % — SIGNIFICANT CHANGE UP (ref 1.7–9.3)
MONOCYTES NFR BLD AUTO: 3.8 % — SIGNIFICANT CHANGE UP (ref 1.7–9.3)
MONOCYTES NFR BLD AUTO: 6 % — SIGNIFICANT CHANGE UP (ref 1.7–9.3)
MONOCYTES NFR BLD AUTO: 7.6 % — SIGNIFICANT CHANGE UP (ref 1.7–9.3)
MRSA PCR RESULT.: NEGATIVE — SIGNIFICANT CHANGE UP
NEUTROPHILS # BLD AUTO: 1.18 K/UL — LOW (ref 1.4–6.5)
NEUTROPHILS # BLD AUTO: 10.12 K/UL — HIGH (ref 1.4–6.5)
NEUTROPHILS # BLD AUTO: 10.97 K/UL — HIGH (ref 1.4–6.5)
NEUTROPHILS # BLD AUTO: 11.07 K/UL — HIGH (ref 1.4–6.5)
NEUTROPHILS # BLD AUTO: 11.63 K/UL — HIGH (ref 1.4–6.5)
NEUTROPHILS # BLD AUTO: 12.46 K/UL — HIGH (ref 1.4–6.5)
NEUTROPHILS # BLD AUTO: 14.56 K/UL — HIGH (ref 1.4–6.5)
NEUTROPHILS # BLD AUTO: 15.91 K/UL — HIGH (ref 1.4–6.5)
NEUTROPHILS # BLD AUTO: 18.81 K/UL — HIGH (ref 1.4–6.5)
NEUTROPHILS # BLD AUTO: 21.38 K/UL — HIGH (ref 1.4–6.5)
NEUTROPHILS # BLD AUTO: 5.74 K/UL — SIGNIFICANT CHANGE UP (ref 1.4–6.5)
NEUTROPHILS # BLD AUTO: 6.53 K/UL — HIGH (ref 1.4–6.5)
NEUTROPHILS # BLD AUTO: 6.53 K/UL — HIGH (ref 1.4–6.5)
NEUTROPHILS # BLD AUTO: 8.28 K/UL — HIGH (ref 1.4–6.5)
NEUTROPHILS # BLD AUTO: 8.9 K/UL — HIGH (ref 1.4–6.5)
NEUTROPHILS # BLD AUTO: 9.09 K/UL — HIGH (ref 1.4–6.5)
NEUTROPHILS NFR BLD AUTO: 40 % — LOW (ref 42.2–75.2)
NEUTROPHILS NFR BLD AUTO: 81 % — HIGH (ref 42.2–75.2)
NEUTROPHILS NFR BLD AUTO: 83.3 % — HIGH (ref 42.2–75.2)
NEUTROPHILS NFR BLD AUTO: 87.1 % — HIGH (ref 42.2–75.2)
NEUTROPHILS NFR BLD AUTO: 87.5 % — HIGH (ref 42.2–75.2)
NEUTROPHILS NFR BLD AUTO: 87.9 % — HIGH (ref 42.2–75.2)
NEUTROPHILS NFR BLD AUTO: 88.7 % — HIGH (ref 42.2–75.2)
NEUTROPHILS NFR BLD AUTO: 88.7 % — HIGH (ref 42.2–75.2)
NEUTROPHILS NFR BLD AUTO: 88.9 % — HIGH (ref 42.2–75.2)
NEUTROPHILS NFR BLD AUTO: 89.8 % — HIGH (ref 42.2–75.2)
NEUTROPHILS NFR BLD AUTO: 90.5 % — HIGH (ref 42.2–75.2)
NEUTROPHILS NFR BLD AUTO: 91.5 % — HIGH (ref 42.2–75.2)
NEUTROPHILS NFR BLD AUTO: 91.8 % — HIGH (ref 42.2–75.2)
NEUTROPHILS NFR BLD AUTO: 92.3 % — HIGH (ref 42.2–75.2)
NEUTROPHILS NFR BLD AUTO: 93 % — HIGH (ref 42.2–75.2)
NEUTROPHILS NFR BLD AUTO: 94.2 % — HIGH (ref 42.2–75.2)
NITRITE UR-MCNC: NEGATIVE — SIGNIFICANT CHANGE UP
NITRITE UR-MCNC: NEGATIVE — SIGNIFICANT CHANGE UP
NRBC # BLD: 0 /100 WBCS — SIGNIFICANT CHANGE UP (ref 0–0)
NRBC # BLD: 0 /100 WBCS — SIGNIFICANT CHANGE UP (ref 0–0)
NRBC # BLD: 2 /100 WBCS — HIGH (ref 0–0)
NRBC # BLD: 2 /100 WBCS — HIGH (ref 0–0)
PCO2 BLDA: 33 MMHG — LOW (ref 38–42)
PCO2 BLDA: 34 MMHG — LOW (ref 38–42)
PCO2 BLDA: 37 MMHG — LOW (ref 38–42)
PCO2 BLDA: 39 MMHG — SIGNIFICANT CHANGE UP (ref 38–42)
PCO2 BLDA: 39 MMHG — SIGNIFICANT CHANGE UP (ref 38–42)
PCO2 BLDA: 41 MMHG — SIGNIFICANT CHANGE UP (ref 38–42)
PCO2 BLDA: 45 MMHG — HIGH (ref 38–42)
PCO2 BLDA: 45 MMHG — HIGH (ref 38–42)
PCO2 BLDA: 49 MMHG — HIGH (ref 38–42)
PCO2 BLDA: 50 MMHG — HIGH (ref 38–42)
PH BLDA: 7.22 — LOW (ref 7.38–7.42)
PH BLDA: 7.24 — LOW (ref 7.38–7.42)
PH BLDA: 7.24 — LOW (ref 7.38–7.42)
PH BLDA: 7.28 — LOW (ref 7.38–7.42)
PH BLDA: 7.31 — LOW (ref 7.38–7.42)
PH BLDA: 7.31 — LOW (ref 7.38–7.42)
PH BLDA: 7.33 — LOW (ref 7.38–7.42)
PH BLDA: 7.36 — LOW (ref 7.38–7.42)
PH BLDA: 7.37 — LOW (ref 7.38–7.42)
PH BLDA: 7.43 — HIGH (ref 7.38–7.42)
PH BLDA: 7.44 — HIGH (ref 7.38–7.42)
PH UR: 5 — SIGNIFICANT CHANGE UP (ref 5–8)
PH UR: 5 — SIGNIFICANT CHANGE UP (ref 5–8)
PHOSPHATE SERPL-MCNC: 8.1 MG/DL — HIGH (ref 2.1–4.9)
PLATELET # BLD AUTO: 128 K/UL — LOW (ref 130–400)
PLATELET # BLD AUTO: 132 K/UL — SIGNIFICANT CHANGE UP (ref 130–400)
PLATELET # BLD AUTO: 136 K/UL — SIGNIFICANT CHANGE UP (ref 130–400)
PLATELET # BLD AUTO: 138 K/UL — SIGNIFICANT CHANGE UP (ref 130–400)
PLATELET # BLD AUTO: 146 K/UL — SIGNIFICANT CHANGE UP (ref 130–400)
PLATELET # BLD AUTO: 152 K/UL — SIGNIFICANT CHANGE UP (ref 130–400)
PLATELET # BLD AUTO: 54 K/UL — LOW (ref 130–400)
PLATELET # BLD AUTO: 56 K/UL — LOW (ref 130–400)
PLATELET # BLD AUTO: 58 K/UL — LOW (ref 130–400)
PLATELET # BLD AUTO: 65 K/UL — LOW (ref 130–400)
PLATELET # BLD AUTO: 65 K/UL — LOW (ref 130–400)
PLATELET # BLD AUTO: 66 K/UL — LOW (ref 130–400)
PLATELET # BLD AUTO: 67 K/UL — LOW (ref 130–400)
PLATELET # BLD AUTO: 76 K/UL — LOW (ref 130–400)
PLATELET # BLD AUTO: 76 K/UL — LOW (ref 130–400)
PLATELET # BLD AUTO: 78 K/UL — LOW (ref 130–400)
PLATELET # BLD AUTO: 94 K/UL — LOW (ref 130–400)
PO2 BLDA: 100 MMHG — HIGH (ref 78–95)
PO2 BLDA: 114 MMHG — HIGH (ref 78–95)
PO2 BLDA: 119 MMHG — HIGH (ref 78–95)
PO2 BLDA: 136 MMHG — HIGH (ref 78–95)
PO2 BLDA: 143 MMHG — HIGH (ref 78–95)
PO2 BLDA: 184 MMHG — HIGH (ref 78–95)
PO2 BLDA: 285 MMHG — HIGH (ref 78–95)
PO2 BLDA: 71 MMHG — LOW (ref 78–95)
PO2 BLDA: 82 MMHG — SIGNIFICANT CHANGE UP (ref 78–95)
PO2 BLDA: 83 MMHG — SIGNIFICANT CHANGE UP (ref 78–95)
POST UNIT NUMBER: SIGNIFICANT CHANGE UP
POTASSIUM SERPL-MCNC: 4.7 MMOL/L — SIGNIFICANT CHANGE UP (ref 3.5–5)
POTASSIUM SERPL-MCNC: 4.8 MMOL/L — SIGNIFICANT CHANGE UP (ref 3.5–5)
POTASSIUM SERPL-MCNC: 4.9 MMOL/L — SIGNIFICANT CHANGE UP (ref 3.5–5)
POTASSIUM SERPL-MCNC: 5.1 MMOL/L — HIGH (ref 3.5–5)
POTASSIUM SERPL-MCNC: 5.1 MMOL/L — HIGH (ref 3.5–5)
POTASSIUM SERPL-MCNC: 5.4 MMOL/L — HIGH (ref 3.5–5)
POTASSIUM SERPL-MCNC: 5.4 MMOL/L — HIGH (ref 3.5–5)
POTASSIUM SERPL-MCNC: 5.5 MMOL/L — HIGH (ref 3.5–5)
POTASSIUM SERPL-MCNC: 5.6 MMOL/L — HIGH (ref 3.5–5)
POTASSIUM SERPL-MCNC: 5.7 MMOL/L — HIGH (ref 3.5–5)
POTASSIUM SERPL-MCNC: 5.8 MMOL/L — HIGH (ref 3.5–5)
POTASSIUM SERPL-MCNC: 6 MMOL/L — CRITICAL HIGH (ref 3.5–5)
POTASSIUM SERPL-MCNC: 6.1 MMOL/L — CRITICAL HIGH (ref 3.5–5)
POTASSIUM SERPL-MCNC: 6.2 MMOL/L — CRITICAL HIGH (ref 3.5–5)
POTASSIUM SERPL-MCNC: 6.4 MMOL/L — CRITICAL HIGH (ref 3.5–5)
POTASSIUM SERPL-SCNC: 4.7 MMOL/L — SIGNIFICANT CHANGE UP (ref 3.5–5)
POTASSIUM SERPL-SCNC: 4.8 MMOL/L — SIGNIFICANT CHANGE UP (ref 3.5–5)
POTASSIUM SERPL-SCNC: 4.9 MMOL/L — SIGNIFICANT CHANGE UP (ref 3.5–5)
POTASSIUM SERPL-SCNC: 5.1 MMOL/L — HIGH (ref 3.5–5)
POTASSIUM SERPL-SCNC: 5.1 MMOL/L — HIGH (ref 3.5–5)
POTASSIUM SERPL-SCNC: 5.4 MMOL/L — HIGH (ref 3.5–5)
POTASSIUM SERPL-SCNC: 5.4 MMOL/L — HIGH (ref 3.5–5)
POTASSIUM SERPL-SCNC: 5.5 MMOL/L — HIGH (ref 3.5–5)
POTASSIUM SERPL-SCNC: 5.6 MMOL/L — HIGH (ref 3.5–5)
POTASSIUM SERPL-SCNC: 5.7 MMOL/L — HIGH (ref 3.5–5)
POTASSIUM SERPL-SCNC: 5.8 MMOL/L — HIGH (ref 3.5–5)
POTASSIUM SERPL-SCNC: 6 MMOL/L — CRITICAL HIGH (ref 3.5–5)
POTASSIUM SERPL-SCNC: 6.1 MMOL/L — CRITICAL HIGH (ref 3.5–5)
POTASSIUM SERPL-SCNC: 6.2 MMOL/L — CRITICAL HIGH (ref 3.5–5)
POTASSIUM SERPL-SCNC: 6.4 MMOL/L — CRITICAL HIGH (ref 3.5–5)
PROT PATTERN SERPL ELPH-IMP: SIGNIFICANT CHANGE UP
PROT SERPL-MCNC: 10.1 G/DL — HIGH (ref 6–8)
PROT SERPL-MCNC: 5.6 G/DL — LOW (ref 6–8)
PROT SERPL-MCNC: 5.8 G/DL — LOW (ref 6–8)
PROT SERPL-MCNC: 6 G/DL — SIGNIFICANT CHANGE UP (ref 6–8)
PROT SERPL-MCNC: 6.1 G/DL — SIGNIFICANT CHANGE UP (ref 6–8)
PROT SERPL-MCNC: 6.1 G/DL — SIGNIFICANT CHANGE UP (ref 6–8)
PROT SERPL-MCNC: 6.5 G/DL — SIGNIFICANT CHANGE UP (ref 6–8)
PROT SERPL-MCNC: 7.1 G/DL — SIGNIFICANT CHANGE UP (ref 6–8)
PROT SERPL-MCNC: 7.8 G/DL — SIGNIFICANT CHANGE UP (ref 6–8.3)
PROT SERPL-MCNC: 7.8 G/DL — SIGNIFICANT CHANGE UP (ref 6–8.3)
PROT SERPL-MCNC: 8.2 G/DL — HIGH (ref 6–8)
PROT SERPL-MCNC: 8.2 G/DL — HIGH (ref 6–8)
PROT SERPL-MCNC: 8.8 G/DL — HIGH (ref 6–8)
PROT UR-MCNC: 100 MG/DL
PROT UR-MCNC: 30 MG/DL
PROTHROM AB SERPL-ACNC: 13.1 SEC — HIGH (ref 9.95–12.87)
PROTHROM AB SERPL-ACNC: 13.5 SEC — HIGH (ref 9.95–12.87)
PROTHROM AB SERPL-ACNC: 14.9 SEC — HIGH (ref 9.95–12.87)
PROTHROM AB SERPL-ACNC: 14.9 SEC — HIGH (ref 9.95–12.87)
PROTHROM AB SERPL-ACNC: 15.6 SEC — HIGH (ref 9.95–12.87)
PROTHROM AB SERPL-ACNC: 16.8 SEC — HIGH (ref 9.95–12.87)
PROTHROM AB SERPL-ACNC: 17.7 SEC — HIGH (ref 9.95–12.87)
RBC # BLD: 1.53 M/UL — LOW (ref 4.7–6.1)
RBC # BLD: 1.6 M/UL — LOW (ref 4.7–6.1)
RBC # BLD: 1.84 M/UL — LOW (ref 4.7–6.1)
RBC # BLD: 1.88 M/UL — LOW (ref 4.7–6.1)
RBC # BLD: 2.12 M/UL — LOW (ref 4.7–6.1)
RBC # BLD: 2.17 M/UL — LOW (ref 4.7–6.1)
RBC # BLD: 2.22 M/UL — LOW (ref 4.7–6.1)
RBC # BLD: 2.31 M/UL — LOW (ref 4.7–6.1)
RBC # BLD: 2.32 M/UL — LOW (ref 4.7–6.1)
RBC # BLD: 2.38 M/UL — LOW (ref 4.7–6.1)
RBC # BLD: 2.4 M/UL — LOW (ref 4.7–6.1)
RBC # BLD: 2.42 M/UL — LOW (ref 4.7–6.1)
RBC # BLD: 2.52 M/UL — LOW (ref 4.7–6.1)
RBC # BLD: 2.54 M/UL — LOW (ref 4.7–6.1)
RBC # BLD: 2.55 M/UL — LOW (ref 4.7–6.1)
RBC # BLD: 2.62 M/UL — LOW (ref 4.7–6.1)
RBC # BLD: 2.74 M/UL — LOW (ref 4.7–6.1)
RBC # BLD: 3.05 M/UL — LOW (ref 4.7–6.1)
RBC # FLD: 17.5 % — HIGH (ref 11.5–14.5)
RBC # FLD: 17.7 % — HIGH (ref 11.5–14.5)
RBC # FLD: 18.1 % — HIGH (ref 11.5–14.5)
RBC # FLD: 18.2 % — HIGH (ref 11.5–14.5)
RBC # FLD: 18.5 % — HIGH (ref 11.5–14.5)
RBC # FLD: 18.6 % — HIGH (ref 11.5–14.5)
RBC # FLD: 18.8 % — HIGH (ref 11.5–14.5)
RBC # FLD: 18.9 % — HIGH (ref 11.5–14.5)
RBC # FLD: 19 % — HIGH (ref 11.5–14.5)
RBC # FLD: 20.6 % — HIGH (ref 11.5–14.5)
RBC # FLD: 21.5 % — HIGH (ref 11.5–14.5)
RBC # FLD: 22.7 % — HIGH (ref 11.5–14.5)
RBC # FLD: 23.9 % — HIGH (ref 11.5–14.5)
RBC # FLD: 25.8 % — HIGH (ref 11.5–14.5)
RBC # FLD: 26 % — HIGH (ref 11.5–14.5)
RETICS #: 21.8 K/UL — LOW (ref 25–125)
RETICS/RBC NFR: 1.2 % — SIGNIFICANT CHANGE UP (ref 0.5–1.5)
SAO2 % BLDA: 100 % — HIGH (ref 94–98)
SAO2 % BLDA: 92 % — LOW (ref 94–98)
SAO2 % BLDA: 96 % — SIGNIFICANT CHANGE UP (ref 94–98)
SAO2 % BLDA: 97 % — SIGNIFICANT CHANGE UP (ref 94–98)
SAO2 % BLDA: 98 % — SIGNIFICANT CHANGE UP (ref 94–98)
SAO2 % BLDA: 99 % — HIGH (ref 94–98)
SODIUM SERPL-SCNC: 132 MMOL/L — LOW (ref 135–146)
SODIUM SERPL-SCNC: 134 MMOL/L — LOW (ref 135–146)
SODIUM SERPL-SCNC: 135 MMOL/L — SIGNIFICANT CHANGE UP (ref 135–146)
SODIUM SERPL-SCNC: 137 MMOL/L — SIGNIFICANT CHANGE UP (ref 135–146)
SODIUM SERPL-SCNC: 138 MMOL/L — SIGNIFICANT CHANGE UP (ref 135–146)
SODIUM SERPL-SCNC: 139 MMOL/L — SIGNIFICANT CHANGE UP (ref 135–146)
SODIUM SERPL-SCNC: 141 MMOL/L — SIGNIFICANT CHANGE UP (ref 135–146)
SODIUM SERPL-SCNC: 142 MMOL/L — SIGNIFICANT CHANGE UP (ref 135–146)
SODIUM SERPL-SCNC: 145 MMOL/L — SIGNIFICANT CHANGE UP (ref 135–146)
SP GR SPEC: 1.02 — SIGNIFICANT CHANGE UP (ref 1.01–1.03)
SP GR SPEC: 1.02 — SIGNIFICANT CHANGE UP (ref 1.01–1.03)
SPECIMEN SOURCE: SIGNIFICANT CHANGE UP
TIBC SERPL-MCNC: 135 UG/ML — LOW (ref 260–400)
TROPONIN I SERPL-MCNC: 0.26 NG/ML — HIGH (ref 0–0.05)
TROPONIN I SERPL-MCNC: 0.58 NG/ML — HIGH (ref 0–0.05)
TYPE + AB SCN PNL BLD: SIGNIFICANT CHANGE UP
UROBILINOGEN FLD QL: 0.2 MG/DL — SIGNIFICANT CHANGE UP (ref 0.2–0.2)
UROBILINOGEN FLD QL: 0.2 MG/DL — SIGNIFICANT CHANGE UP (ref 0.2–0.2)
VANCOMYCIN TROUGH SERPL-MCNC: 33.6 UG/ML — HIGH (ref 5–10)
VIT B12 SERPL-MCNC: >2000 PG/ML — HIGH (ref 232–1245)
WBC # BLD: 10.34 K/UL — SIGNIFICANT CHANGE UP (ref 4.8–10.8)
WBC # BLD: 12.11 K/UL — HIGH (ref 4.8–10.8)
WBC # BLD: 12.14 K/UL — HIGH (ref 4.8–10.8)
WBC # BLD: 12.33 K/UL — HIGH (ref 4.8–10.8)
WBC # BLD: 13.11 K/UL — HIGH (ref 4.8–10.8)
WBC # BLD: 13.62 K/UL — HIGH (ref 4.8–10.8)
WBC # BLD: 16.63 K/UL — HIGH (ref 4.8–10.8)
WBC # BLD: 16.89 K/UL — HIGH (ref 4.8–10.8)
WBC # BLD: 2.69 K/UL — LOW (ref 4.8–10.8)
WBC # BLD: 20.5 K/UL — HIGH (ref 4.8–10.8)
WBC # BLD: 23 K/UL — HIGH (ref 4.8–10.8)
WBC # BLD: 7.08 K/UL — SIGNIFICANT CHANGE UP (ref 4.8–10.8)
WBC # BLD: 7.35 K/UL — SIGNIFICANT CHANGE UP (ref 4.8–10.8)
WBC # BLD: 7.36 K/UL — SIGNIFICANT CHANGE UP (ref 4.8–10.8)
WBC # BLD: 9.39 K/UL — SIGNIFICANT CHANGE UP (ref 4.8–10.8)
WBC # BLD: 9.5 K/UL — SIGNIFICANT CHANGE UP (ref 4.8–10.8)
WBC # BLD: 9.65 K/UL — SIGNIFICANT CHANGE UP (ref 4.8–10.8)
WBC # FLD AUTO: 10.34 K/UL — SIGNIFICANT CHANGE UP (ref 4.8–10.8)
WBC # FLD AUTO: 12.11 K/UL — HIGH (ref 4.8–10.8)
WBC # FLD AUTO: 12.14 K/UL — HIGH (ref 4.8–10.8)
WBC # FLD AUTO: 12.33 K/UL — HIGH (ref 4.8–10.8)
WBC # FLD AUTO: 13.11 K/UL — HIGH (ref 4.8–10.8)
WBC # FLD AUTO: 13.62 K/UL — HIGH (ref 4.8–10.8)
WBC # FLD AUTO: 16.63 K/UL — HIGH (ref 4.8–10.8)
WBC # FLD AUTO: 16.89 K/UL — HIGH (ref 4.8–10.8)
WBC # FLD AUTO: 2.69 K/UL — LOW (ref 4.8–10.8)
WBC # FLD AUTO: 20.5 K/UL — HIGH (ref 4.8–10.8)
WBC # FLD AUTO: 23 K/UL — HIGH (ref 4.8–10.8)
WBC # FLD AUTO: 7.08 K/UL — SIGNIFICANT CHANGE UP (ref 4.8–10.8)
WBC # FLD AUTO: 7.35 K/UL — SIGNIFICANT CHANGE UP (ref 4.8–10.8)
WBC # FLD AUTO: 7.36 K/UL — SIGNIFICANT CHANGE UP (ref 4.8–10.8)
WBC # FLD AUTO: 9.39 K/UL — SIGNIFICANT CHANGE UP (ref 4.8–10.8)
WBC # FLD AUTO: 9.5 K/UL — SIGNIFICANT CHANGE UP (ref 4.8–10.8)
WBC # FLD AUTO: 9.65 K/UL — SIGNIFICANT CHANGE UP (ref 4.8–10.8)
WBC UR QL: (no result) /HPF

## 2018-01-01 RX ORDER — METOCLOPRAMIDE HCL 10 MG
5 TABLET ORAL EVERY 8 HOURS
Qty: 0 | Refills: 0 | Status: DISCONTINUED | OUTPATIENT
Start: 2018-01-01 | End: 2018-01-01

## 2018-01-01 RX ORDER — SODIUM POLYSTYRENE SULFONATE 4.1 MEQ/G
30 POWDER, FOR SUSPENSION ORAL ONCE
Qty: 0 | Refills: 0 | Status: COMPLETED | OUTPATIENT
Start: 2018-01-01 | End: 2018-01-01

## 2018-01-01 RX ORDER — NOREPINEPHRINE BITARTRATE/D5W 8 MG/250ML
0.5 PLASTIC BAG, INJECTION (ML) INTRAVENOUS
Qty: 16 | Refills: 0 | Status: DISCONTINUED | OUTPATIENT
Start: 2018-01-01 | End: 2018-01-01

## 2018-01-01 RX ORDER — FENTANYL CITRATE 50 UG/ML
2 INJECTION INTRAVENOUS
Qty: 2500 | Refills: 0 | Status: DISCONTINUED | OUTPATIENT
Start: 2018-01-01 | End: 2018-01-01

## 2018-01-01 RX ORDER — DIPHENHYDRAMINE HCL 50 MG
25 CAPSULE ORAL EVERY 6 HOURS
Qty: 0 | Refills: 0 | Status: DISCONTINUED | OUTPATIENT
Start: 2018-01-01 | End: 2018-01-01

## 2018-01-01 RX ORDER — VANCOMYCIN HCL 1 G
1000 VIAL (EA) INTRAVENOUS EVERY 24 HOURS
Qty: 0 | Refills: 0 | Status: COMPLETED | OUTPATIENT
Start: 2018-01-01 | End: 2018-01-01

## 2018-01-01 RX ORDER — CALCIUM GLUCONATE 100 MG/ML
1 VIAL (ML) INTRAVENOUS ONCE
Qty: 0 | Refills: 0 | Status: COMPLETED | OUTPATIENT
Start: 2018-01-01 | End: 2018-01-01

## 2018-01-01 RX ORDER — NOREPINEPHRINE BITARTRATE/D5W 8 MG/250ML
0.5 PLASTIC BAG, INJECTION (ML) INTRAVENOUS
Qty: 8 | Refills: 0 | Status: DISCONTINUED | OUTPATIENT
Start: 2018-01-01 | End: 2018-01-01

## 2018-01-01 RX ORDER — MICAFUNGIN SODIUM 100 MG/1
100 INJECTION, POWDER, LYOPHILIZED, FOR SOLUTION INTRAVENOUS EVERY 24 HOURS
Qty: 0 | Refills: 0 | Status: DISCONTINUED | OUTPATIENT
Start: 2018-01-01 | End: 2018-01-01

## 2018-01-01 RX ORDER — MEROPENEM 1 G/30ML
750 INJECTION INTRAVENOUS EVERY 24 HOURS
Qty: 0 | Refills: 0 | Status: DISCONTINUED | OUTPATIENT
Start: 2018-01-01 | End: 2018-01-01

## 2018-01-01 RX ORDER — MIDAZOLAM HYDROCHLORIDE 1 MG/ML
0.5 INJECTION, SOLUTION INTRAMUSCULAR; INTRAVENOUS
Qty: 100 | Refills: 0 | Status: DISCONTINUED | OUTPATIENT
Start: 2018-01-01 | End: 2018-01-01

## 2018-01-01 RX ORDER — ROBINUL 0.2 MG/ML
0.4 INJECTION INTRAMUSCULAR; INTRAVENOUS ONCE
Qty: 0 | Refills: 0 | Status: COMPLETED | OUTPATIENT
Start: 2018-01-01 | End: 2018-01-01

## 2018-01-01 RX ORDER — SODIUM CHLORIDE 9 MG/ML
1000 INJECTION INTRAMUSCULAR; INTRAVENOUS; SUBCUTANEOUS
Qty: 0 | Refills: 0 | Status: DISCONTINUED | OUTPATIENT
Start: 2018-01-01 | End: 2018-01-01

## 2018-01-01 RX ORDER — AZITHROMYCIN 500 MG/1
500 TABLET, FILM COATED ORAL ONCE
Qty: 0 | Refills: 0 | Status: COMPLETED | OUTPATIENT
Start: 2018-01-01 | End: 2018-01-01

## 2018-01-01 RX ORDER — SODIUM CHLORIDE 9 MG/ML
1000 INJECTION, SOLUTION INTRAVENOUS
Qty: 0 | Refills: 0 | Status: DISCONTINUED | OUTPATIENT
Start: 2018-01-01 | End: 2018-01-01

## 2018-01-01 RX ORDER — DEXAMETHASONE 0.5 MG/5ML
40 ELIXIR ORAL DAILY
Qty: 0 | Refills: 0 | Status: DISCONTINUED | OUTPATIENT
Start: 2018-01-01 | End: 2018-01-01

## 2018-01-01 RX ORDER — PANTOPRAZOLE SODIUM 20 MG/1
40 TABLET, DELAYED RELEASE ORAL DAILY
Qty: 0 | Refills: 0 | Status: DISCONTINUED | OUTPATIENT
Start: 2018-01-01 | End: 2018-01-01

## 2018-01-01 RX ORDER — DEXAMETHASONE 0.5 MG/5ML
40 ELIXIR ORAL DAILY
Qty: 0 | Refills: 0 | Status: COMPLETED | OUTPATIENT
Start: 2018-01-01 | End: 2018-01-01

## 2018-01-01 RX ORDER — SODIUM CHLORIDE 9 MG/ML
500 INJECTION INTRAMUSCULAR; INTRAVENOUS; SUBCUTANEOUS ONCE
Qty: 0 | Refills: 0 | Status: COMPLETED | OUTPATIENT
Start: 2018-01-01 | End: 2018-01-01

## 2018-01-01 RX ORDER — SENNA PLUS 8.6 MG/1
1 TABLET ORAL
Qty: 0 | Refills: 0 | Status: DISCONTINUED | OUTPATIENT
Start: 2018-01-01 | End: 2018-01-01

## 2018-01-01 RX ORDER — FENTANYL CITRATE 50 UG/ML
50 INJECTION INTRAVENOUS
Qty: 0 | Refills: 0 | Status: DISCONTINUED | OUTPATIENT
Start: 2018-01-01 | End: 2018-01-01

## 2018-01-01 RX ORDER — VANCOMYCIN HCL 1 G
1250 VIAL (EA) INTRAVENOUS EVERY 24 HOURS
Qty: 0 | Refills: 0 | Status: DISCONTINUED | OUTPATIENT
Start: 2018-01-01 | End: 2018-01-01

## 2018-01-01 RX ORDER — DEXTROSE 50 % IN WATER 50 %
50 SYRINGE (ML) INTRAVENOUS ONCE
Qty: 0 | Refills: 0 | Status: COMPLETED | OUTPATIENT
Start: 2018-01-01 | End: 2018-01-01

## 2018-01-01 RX ORDER — INSULIN HUMAN 100 [IU]/ML
10 INJECTION, SOLUTION SUBCUTANEOUS ONCE
Qty: 0 | Refills: 0 | Status: COMPLETED | OUTPATIENT
Start: 2018-01-01 | End: 2018-01-01

## 2018-01-01 RX ORDER — LACTULOSE 10 G/15ML
20 SOLUTION ORAL ONCE
Qty: 0 | Refills: 0 | Status: COMPLETED | OUTPATIENT
Start: 2018-01-01 | End: 2018-01-01

## 2018-01-01 RX ORDER — VANCOMYCIN HCL 1 G
1250 VIAL (EA) INTRAVENOUS ONCE
Qty: 0 | Refills: 0 | Status: COMPLETED | OUTPATIENT
Start: 2018-01-01 | End: 2018-01-01

## 2018-01-01 RX ORDER — MEROPENEM 1 G/30ML
750 INJECTION INTRAVENOUS ONCE
Qty: 0 | Refills: 0 | Status: COMPLETED | OUTPATIENT
Start: 2018-01-01 | End: 2018-01-01

## 2018-01-01 RX ORDER — MORPHINE SULFATE 50 MG/1
4 CAPSULE, EXTENDED RELEASE ORAL ONCE
Qty: 0 | Refills: 0 | Status: DISCONTINUED | OUTPATIENT
Start: 2018-01-01 | End: 2018-01-01

## 2018-01-01 RX ORDER — DEXTROSE 50 % IN WATER 50 %
50 SYRINGE (ML) INTRAVENOUS ONCE
Qty: 0 | Refills: 0 | Status: DISCONTINUED | OUTPATIENT
Start: 2018-01-01 | End: 2018-01-01

## 2018-01-01 RX ORDER — MICAFUNGIN SODIUM 100 MG/1
100 INJECTION, POWDER, LYOPHILIZED, FOR SOLUTION INTRAVENOUS ONCE
Qty: 0 | Refills: 0 | Status: COMPLETED | OUTPATIENT
Start: 2018-01-01 | End: 2018-01-01

## 2018-01-01 RX ORDER — INFLUENZA VIRUS VACCINE 15; 15; 15; 15 UG/.5ML; UG/.5ML; UG/.5ML; UG/.5ML
0.5 SUSPENSION INTRAMUSCULAR ONCE
Qty: 0 | Refills: 0 | Status: DISCONTINUED | OUTPATIENT
Start: 2018-01-01 | End: 2018-01-01

## 2018-01-01 RX ORDER — PROPOFOL 10 MG/ML
5 INJECTION, EMULSION INTRAVENOUS
Qty: 1000 | Refills: 0 | Status: DISCONTINUED | OUTPATIENT
Start: 2018-01-01 | End: 2018-01-01

## 2018-01-01 RX ORDER — SODIUM CHLORIDE 9 MG/ML
1000 INJECTION, SOLUTION INTRAVENOUS ONCE
Qty: 0 | Refills: 0 | Status: COMPLETED | OUTPATIENT
Start: 2018-01-01 | End: 2018-01-01

## 2018-01-01 RX ORDER — IPRATROPIUM/ALBUTEROL SULFATE 18-103MCG
3 AEROSOL WITH ADAPTER (GRAM) INHALATION EVERY 6 HOURS
Qty: 0 | Refills: 0 | Status: DISCONTINUED | OUTPATIENT
Start: 2018-01-01 | End: 2018-01-01

## 2018-01-01 RX ORDER — VANCOMYCIN HCL 1 G
1250 VIAL (EA) INTRAVENOUS
Qty: 0 | Refills: 0 | Status: DISCONTINUED | OUTPATIENT
Start: 2018-01-01 | End: 2018-01-01

## 2018-01-01 RX ORDER — MEROPENEM 1 G/30ML
1000 INJECTION INTRAVENOUS ONCE
Qty: 0 | Refills: 0 | Status: COMPLETED | OUTPATIENT
Start: 2018-01-01 | End: 2018-01-01

## 2018-01-01 RX ORDER — METOCLOPRAMIDE HCL 10 MG
5 TABLET ORAL
Qty: 0 | Refills: 0 | Status: DISCONTINUED | OUTPATIENT
Start: 2018-01-01 | End: 2018-01-01

## 2018-01-01 RX ORDER — MICAFUNGIN SODIUM 100 MG/1
INJECTION, POWDER, LYOPHILIZED, FOR SOLUTION INTRAVENOUS
Qty: 0 | Refills: 0 | Status: DISCONTINUED | OUTPATIENT
Start: 2018-01-01 | End: 2018-01-01

## 2018-01-01 RX ORDER — TIOTROPIUM BROMIDE 18 UG/1
1 CAPSULE ORAL; RESPIRATORY (INHALATION) DAILY
Qty: 0 | Refills: 0 | Status: DISCONTINUED | OUTPATIENT
Start: 2018-01-01 | End: 2018-01-01

## 2018-01-01 RX ORDER — IPRATROPIUM BROMIDE 0.2 MG/ML
1 SOLUTION, NON-ORAL INHALATION EVERY 6 HOURS
Qty: 0 | Refills: 0 | Status: DISCONTINUED | OUTPATIENT
Start: 2018-01-01 | End: 2018-01-01

## 2018-01-01 RX ORDER — ALBUTEROL 90 UG/1
1 AEROSOL, METERED ORAL EVERY 4 HOURS
Qty: 0 | Refills: 0 | Status: COMPLETED | OUTPATIENT
Start: 2018-01-01 | End: 2019-02-12

## 2018-01-01 RX ORDER — DIPHENHYDRAMINE HCL 50 MG
25 CAPSULE ORAL ONCE
Qty: 0 | Refills: 0 | Status: COMPLETED | OUTPATIENT
Start: 2018-01-01 | End: 2018-01-01

## 2018-01-01 RX ORDER — LACTULOSE 10 G/15ML
SOLUTION ORAL
Qty: 0 | Refills: 0 | Status: DISCONTINUED | OUTPATIENT
Start: 2018-01-01 | End: 2018-01-01

## 2018-01-01 RX ORDER — ACETAMINOPHEN 500 MG
650 TABLET ORAL ONCE
Qty: 0 | Refills: 0 | Status: COMPLETED | OUTPATIENT
Start: 2018-01-01 | End: 2018-01-01

## 2018-01-01 RX ORDER — SODIUM CHLORIDE 9 MG/ML
250 INJECTION INTRAMUSCULAR; INTRAVENOUS; SUBCUTANEOUS ONCE
Qty: 0 | Refills: 0 | Status: COMPLETED | OUTPATIENT
Start: 2018-01-01 | End: 2018-01-01

## 2018-01-01 RX ORDER — ERYTHROMYCIN ETHYLSUCCINATE 400 MG
TABLET ORAL
Qty: 0 | Refills: 0 | Status: DISCONTINUED | OUTPATIENT
Start: 2018-01-01 | End: 2018-01-01

## 2018-01-01 RX ORDER — MIDAZOLAM HYDROCHLORIDE 1 MG/ML
2 INJECTION, SOLUTION INTRAMUSCULAR; INTRAVENOUS ONCE
Qty: 0 | Refills: 0 | Status: DISCONTINUED | OUTPATIENT
Start: 2018-01-01 | End: 2018-01-01

## 2018-01-01 RX ORDER — DEXMEDETOMIDINE HYDROCHLORIDE IN 0.9% SODIUM CHLORIDE 4 UG/ML
74 INJECTION INTRAVENOUS ONCE
Qty: 0 | Refills: 0 | Status: DISCONTINUED | OUTPATIENT
Start: 2018-01-01 | End: 2018-01-01

## 2018-01-01 RX ORDER — VANCOMYCIN HCL 1 G
1000 VIAL (EA) INTRAVENOUS EVERY 24 HOURS
Qty: 0 | Refills: 0 | Status: DISCONTINUED | OUTPATIENT
Start: 2018-01-01 | End: 2018-01-01

## 2018-01-01 RX ORDER — MIDAZOLAM HYDROCHLORIDE 1 MG/ML
1 INJECTION, SOLUTION INTRAMUSCULAR; INTRAVENOUS
Qty: 100 | Refills: 0 | Status: DISCONTINUED | OUTPATIENT
Start: 2018-01-01 | End: 2018-01-01

## 2018-01-01 RX ORDER — ALBUTEROL 90 UG/1
1 AEROSOL, METERED ORAL EVERY 6 HOURS
Qty: 0 | Refills: 0 | Status: DISCONTINUED | OUTPATIENT
Start: 2018-01-01 | End: 2018-01-01

## 2018-01-01 RX ORDER — ERYTHROMYCIN ETHYLSUCCINATE 400 MG
250 TABLET ORAL EVERY 12 HOURS
Qty: 0 | Refills: 0 | Status: DISCONTINUED | OUTPATIENT
Start: 2018-01-01 | End: 2018-01-01

## 2018-01-01 RX ORDER — LACTULOSE 10 G/15ML
20 SOLUTION ORAL EVERY 4 HOURS
Qty: 0 | Refills: 0 | Status: DISCONTINUED | OUTPATIENT
Start: 2018-01-01 | End: 2018-01-01

## 2018-01-01 RX ORDER — FENTANYL CITRATE 50 UG/ML
0.7 INJECTION INTRAVENOUS
Qty: 2500 | Refills: 0 | Status: DISCONTINUED | OUTPATIENT
Start: 2018-01-01 | End: 2018-01-01

## 2018-01-01 RX ORDER — CHLORHEXIDINE GLUCONATE 213 G/1000ML
15 SOLUTION TOPICAL
Qty: 0 | Refills: 0 | Status: DISCONTINUED | OUTPATIENT
Start: 2018-01-01 | End: 2018-01-01

## 2018-01-01 RX ORDER — HEPARIN SODIUM 5000 [USP'U]/ML
5000 INJECTION INTRAVENOUS; SUBCUTANEOUS EVERY 8 HOURS
Qty: 0 | Refills: 0 | Status: DISCONTINUED | OUTPATIENT
Start: 2018-01-01 | End: 2018-01-01

## 2018-01-01 RX ORDER — MEROPENEM 1 G/30ML
500 INJECTION INTRAVENOUS EVERY 24 HOURS
Qty: 0 | Refills: 0 | Status: DISCONTINUED | OUTPATIENT
Start: 2018-01-01 | End: 2018-01-01

## 2018-01-01 RX ORDER — METOCLOPRAMIDE HCL 10 MG
10 TABLET ORAL EVERY 12 HOURS
Qty: 0 | Refills: 0 | Status: COMPLETED | OUTPATIENT
Start: 2018-01-01 | End: 2018-01-01

## 2018-01-01 RX ORDER — DEXMEDETOMIDINE HYDROCHLORIDE IN 0.9% SODIUM CHLORIDE 4 UG/ML
0.2 INJECTION INTRAVENOUS
Qty: 200 | Refills: 0 | Status: DISCONTINUED | OUTPATIENT
Start: 2018-01-01 | End: 2018-01-01

## 2018-01-01 RX ORDER — LACTULOSE 10 G/15ML
20 SOLUTION ORAL ONCE
Qty: 0 | Refills: 0 | Status: DISCONTINUED | OUTPATIENT
Start: 2018-01-01 | End: 2018-01-01

## 2018-01-01 RX ORDER — VANCOMYCIN HCL 1 G
750 VIAL (EA) INTRAVENOUS ONCE
Qty: 0 | Refills: 0 | Status: COMPLETED | OUTPATIENT
Start: 2018-01-01 | End: 2018-01-01

## 2018-01-01 RX ORDER — LACTULOSE 10 G/15ML
30 SOLUTION ORAL EVERY 4 HOURS
Qty: 0 | Refills: 0 | Status: DISCONTINUED | OUTPATIENT
Start: 2018-01-01 | End: 2018-01-01

## 2018-01-01 RX ORDER — MEROPENEM 1 G/30ML
1000 INJECTION INTRAVENOUS EVERY 24 HOURS
Qty: 0 | Refills: 0 | Status: DISCONTINUED | OUTPATIENT
Start: 2018-01-01 | End: 2018-01-01

## 2018-01-01 RX ORDER — ERYTHROMYCIN ETHYLSUCCINATE 400 MG
250 TABLET ORAL EVERY 8 HOURS
Qty: 0 | Refills: 0 | Status: DISCONTINUED | OUTPATIENT
Start: 2018-01-01 | End: 2018-01-01

## 2018-01-01 RX ORDER — CALCIUM ACETATE 667 MG
667 TABLET ORAL
Qty: 0 | Refills: 0 | Status: DISCONTINUED | OUTPATIENT
Start: 2018-01-01 | End: 2018-01-01

## 2018-01-01 RX ADMIN — CHLORHEXIDINE GLUCONATE 15 MILLILITER(S): 213 SOLUTION TOPICAL at 05:16

## 2018-01-01 RX ADMIN — PROPOFOL 2.25 MICROGRAM(S)/KG/MIN: 10 INJECTION, EMULSION INTRAVENOUS at 04:00

## 2018-01-01 RX ADMIN — MIDAZOLAM HYDROCHLORIDE 1 MG/HR: 1 INJECTION, SOLUTION INTRAMUSCULAR; INTRAVENOUS at 15:45

## 2018-01-01 RX ADMIN — HEPARIN SODIUM 5000 UNIT(S): 5000 INJECTION INTRAVENOUS; SUBCUTANEOUS at 06:18

## 2018-01-01 RX ADMIN — FENTANYL CITRATE 14.98 MICROGRAM(S)/KG/HR: 50 INJECTION INTRAVENOUS at 08:31

## 2018-01-01 RX ADMIN — Medication 667 MILLIGRAM(S): at 08:37

## 2018-01-01 RX ADMIN — Medication 2 MILLIGRAM(S): at 16:06

## 2018-01-01 RX ADMIN — Medication 250 MILLIGRAM(S): at 18:59

## 2018-01-01 RX ADMIN — LACTULOSE 30 GRAM(S): 10 SOLUTION ORAL at 22:47

## 2018-01-01 RX ADMIN — Medication 5 MILLIGRAM(S): at 18:21

## 2018-01-01 RX ADMIN — CHLORHEXIDINE GLUCONATE 15 MILLILITER(S): 213 SOLUTION TOPICAL at 18:21

## 2018-01-01 RX ADMIN — MEROPENEM 100 MILLIGRAM(S): 1 INJECTION INTRAVENOUS at 15:46

## 2018-01-01 RX ADMIN — CHLORHEXIDINE GLUCONATE 15 MILLILITER(S): 213 SOLUTION TOPICAL at 17:24

## 2018-01-01 RX ADMIN — CHLORHEXIDINE GLUCONATE 15 MILLILITER(S): 213 SOLUTION TOPICAL at 17:38

## 2018-01-01 RX ADMIN — PANTOPRAZOLE SODIUM 40 MILLIGRAM(S): 20 TABLET, DELAYED RELEASE ORAL at 12:47

## 2018-01-01 RX ADMIN — FENTANYL CITRATE 14.98 MICROGRAM(S)/KG/HR: 50 INJECTION INTRAVENOUS at 11:07

## 2018-01-01 RX ADMIN — Medication 1 PUFF(S): at 08:49

## 2018-01-01 RX ADMIN — PANTOPRAZOLE SODIUM 40 MILLIGRAM(S): 20 TABLET, DELAYED RELEASE ORAL at 11:10

## 2018-01-01 RX ADMIN — Medication 667 MILLIGRAM(S): at 17:45

## 2018-01-01 RX ADMIN — Medication 5 MILLIGRAM(S): at 05:07

## 2018-01-01 RX ADMIN — SENNA PLUS 1 TABLET(S): 8.6 TABLET ORAL at 17:31

## 2018-01-01 RX ADMIN — MEROPENEM 100 MILLIGRAM(S): 1 INJECTION INTRAVENOUS at 15:58

## 2018-01-01 RX ADMIN — PANTOPRAZOLE SODIUM 40 MILLIGRAM(S): 20 TABLET, DELAYED RELEASE ORAL at 12:13

## 2018-01-01 RX ADMIN — ALBUTEROL 1 PUFF(S): 90 AEROSOL, METERED ORAL at 08:22

## 2018-01-01 RX ADMIN — Medication 250 MILLIGRAM(S): at 17:47

## 2018-01-01 RX ADMIN — Medication 2 MILLIGRAM(S): at 15:17

## 2018-01-01 RX ADMIN — PANTOPRAZOLE SODIUM 40 MILLIGRAM(S): 20 TABLET, DELAYED RELEASE ORAL at 12:48

## 2018-01-01 RX ADMIN — LACTULOSE 30 GRAM(S): 10 SOLUTION ORAL at 06:35

## 2018-01-01 RX ADMIN — SENNA PLUS 1 TABLET(S): 8.6 TABLET ORAL at 18:21

## 2018-01-01 RX ADMIN — LACTULOSE 30 GRAM(S): 10 SOLUTION ORAL at 10:13

## 2018-01-01 RX ADMIN — Medication 5 MILLIGRAM(S): at 18:59

## 2018-01-01 RX ADMIN — PANTOPRAZOLE SODIUM 40 MILLIGRAM(S): 20 TABLET, DELAYED RELEASE ORAL at 12:12

## 2018-01-01 RX ADMIN — FENTANYL CITRATE 14.98 MICROGRAM(S)/KG/HR: 50 INJECTION INTRAVENOUS at 20:00

## 2018-01-01 RX ADMIN — INSULIN HUMAN 10 UNIT(S): 100 INJECTION, SOLUTION SUBCUTANEOUS at 13:05

## 2018-01-01 RX ADMIN — LACTULOSE 20 GRAM(S): 10 SOLUTION ORAL at 19:57

## 2018-01-01 RX ADMIN — SENNA PLUS 1 TABLET(S): 8.6 TABLET ORAL at 17:19

## 2018-01-01 RX ADMIN — HEPARIN SODIUM 5000 UNIT(S): 5000 INJECTION INTRAVENOUS; SUBCUTANEOUS at 05:05

## 2018-01-01 RX ADMIN — MEROPENEM 100 MILLIGRAM(S): 1 INJECTION INTRAVENOUS at 21:23

## 2018-01-01 RX ADMIN — MORPHINE SULFATE 4 MILLIGRAM(S): 50 CAPSULE, EXTENDED RELEASE ORAL at 21:39

## 2018-01-01 RX ADMIN — PANTOPRAZOLE SODIUM 40 MILLIGRAM(S): 20 TABLET, DELAYED RELEASE ORAL at 12:24

## 2018-01-01 RX ADMIN — CHLORHEXIDINE GLUCONATE 15 MILLILITER(S): 213 SOLUTION TOPICAL at 06:21

## 2018-01-01 RX ADMIN — LACTULOSE 30 GRAM(S): 10 SOLUTION ORAL at 14:35

## 2018-01-01 RX ADMIN — CHLORHEXIDINE GLUCONATE 15 MILLILITER(S): 213 SOLUTION TOPICAL at 05:53

## 2018-01-01 RX ADMIN — Medication 146.67 MILLIGRAM(S): at 06:17

## 2018-01-01 RX ADMIN — ALBUTEROL 1 PUFF(S): 90 AEROSOL, METERED ORAL at 01:49

## 2018-01-01 RX ADMIN — Medication 100 GRAM(S): at 12:41

## 2018-01-01 RX ADMIN — PANTOPRAZOLE SODIUM 40 MILLIGRAM(S): 20 TABLET, DELAYED RELEASE ORAL at 11:16

## 2018-01-01 RX ADMIN — SODIUM CHLORIDE 1000 MILLILITER(S): 9 INJECTION INTRAMUSCULAR; INTRAVENOUS; SUBCUTANEOUS at 15:00

## 2018-01-01 RX ADMIN — SENNA PLUS 1 TABLET(S): 8.6 TABLET ORAL at 17:41

## 2018-01-01 RX ADMIN — MICAFUNGIN SODIUM 105 MILLIGRAM(S): 100 INJECTION, POWDER, LYOPHILIZED, FOR SOLUTION INTRAVENOUS at 11:14

## 2018-01-01 RX ADMIN — LACTULOSE 30 GRAM(S): 10 SOLUTION ORAL at 05:41

## 2018-01-01 RX ADMIN — Medication 667 MILLIGRAM(S): at 08:27

## 2018-01-01 RX ADMIN — LACTULOSE 30 GRAM(S): 10 SOLUTION ORAL at 02:55

## 2018-01-01 RX ADMIN — SENNA PLUS 1 TABLET(S): 8.6 TABLET ORAL at 06:35

## 2018-01-01 RX ADMIN — ALBUTEROL 1 PUFF(S): 90 AEROSOL, METERED ORAL at 13:54

## 2018-01-01 RX ADMIN — CHLORHEXIDINE GLUCONATE 15 MILLILITER(S): 213 SOLUTION TOPICAL at 17:19

## 2018-01-01 RX ADMIN — Medication 35.11 MICROGRAM(S)/KG/MIN: at 13:52

## 2018-01-01 RX ADMIN — Medication 1 PUFF(S): at 01:20

## 2018-01-01 RX ADMIN — Medication 146.67 MILLIGRAM(S): at 20:00

## 2018-01-01 RX ADMIN — FENTANYL CITRATE 14.98 MICROGRAM(S)/KG/HR: 50 INJECTION INTRAVENOUS at 07:00

## 2018-01-01 RX ADMIN — Medication 200 GRAM(S): at 13:49

## 2018-01-01 RX ADMIN — SENNA PLUS 1 TABLET(S): 8.6 TABLET ORAL at 05:40

## 2018-01-01 RX ADMIN — SODIUM POLYSTYRENE SULFONATE 30 GRAM(S): 4.1 POWDER, FOR SUSPENSION ORAL at 13:31

## 2018-01-01 RX ADMIN — PANTOPRAZOLE SODIUM 40 MILLIGRAM(S): 20 TABLET, DELAYED RELEASE ORAL at 13:29

## 2018-01-01 RX ADMIN — FENTANYL CITRATE 14.98 MICROGRAM(S)/KG/HR: 50 INJECTION INTRAVENOUS at 19:47

## 2018-01-01 RX ADMIN — PANTOPRAZOLE SODIUM 40 MILLIGRAM(S): 20 TABLET, DELAYED RELEASE ORAL at 12:23

## 2018-01-01 RX ADMIN — Medication 5 MILLIGRAM(S): at 11:16

## 2018-01-01 RX ADMIN — DEXMEDETOMIDINE HYDROCHLORIDE IN 0.9% SODIUM CHLORIDE 3.75 MICROGRAM(S)/KG/HR: 4 INJECTION INTRAVENOUS at 19:47

## 2018-01-01 RX ADMIN — LACTULOSE 30 GRAM(S): 10 SOLUTION ORAL at 23:02

## 2018-01-01 RX ADMIN — SENNA PLUS 1 TABLET(S): 8.6 TABLET ORAL at 17:48

## 2018-01-01 RX ADMIN — LACTULOSE 30 GRAM(S): 10 SOLUTION ORAL at 04:02

## 2018-01-01 RX ADMIN — FENTANYL CITRATE 14.98 MICROGRAM(S)/KG/HR: 50 INJECTION INTRAVENOUS at 11:12

## 2018-01-01 RX ADMIN — SENNA PLUS 1 TABLET(S): 8.6 TABLET ORAL at 18:59

## 2018-01-01 RX ADMIN — MEROPENEM 100 MILLIGRAM(S): 1 INJECTION INTRAVENOUS at 14:44

## 2018-01-01 RX ADMIN — Medication 1 TABLET(S): at 05:33

## 2018-01-01 RX ADMIN — FENTANYL CITRATE 14.98 MICROGRAM(S)/KG/HR: 50 INJECTION INTRAVENOUS at 07:46

## 2018-01-01 RX ADMIN — ALBUTEROL 1 PUFF(S): 90 AEROSOL, METERED ORAL at 19:26

## 2018-01-01 RX ADMIN — MIDAZOLAM HYDROCHLORIDE 2 MILLIGRAM(S): 1 INJECTION, SOLUTION INTRAMUSCULAR; INTRAVENOUS at 07:24

## 2018-01-01 RX ADMIN — Medication 5 MILLIGRAM(S): at 19:16

## 2018-01-01 RX ADMIN — FENTANYL CITRATE 14.98 MICROGRAM(S)/KG/HR: 50 INJECTION INTRAVENOUS at 12:03

## 2018-01-01 RX ADMIN — Medication 1 PUFF(S): at 13:54

## 2018-01-01 RX ADMIN — Medication 250 MILLIGRAM(S): at 05:16

## 2018-01-01 RX ADMIN — MICAFUNGIN SODIUM 105 MILLIGRAM(S): 100 INJECTION, POWDER, LYOPHILIZED, FOR SOLUTION INTRAVENOUS at 11:16

## 2018-01-01 RX ADMIN — FENTANYL CITRATE 5.54 MICROGRAM(S)/KG/HR: 50 INJECTION INTRAVENOUS at 12:11

## 2018-01-01 RX ADMIN — PANTOPRAZOLE SODIUM 40 MILLIGRAM(S): 20 TABLET, DELAYED RELEASE ORAL at 11:12

## 2018-01-01 RX ADMIN — Medication 250 MILLIGRAM(S): at 14:11

## 2018-01-01 RX ADMIN — Medication 1 PUFF(S): at 20:19

## 2018-01-01 RX ADMIN — FENTANYL CITRATE 50 MICROGRAM(S): 50 INJECTION INTRAVENOUS at 16:04

## 2018-01-01 RX ADMIN — Medication 250 MILLIGRAM(S): at 16:56

## 2018-01-01 RX ADMIN — SENNA PLUS 1 TABLET(S): 8.6 TABLET ORAL at 05:07

## 2018-01-01 RX ADMIN — SENNA PLUS 1 TABLET(S): 8.6 TABLET ORAL at 05:33

## 2018-01-01 RX ADMIN — HEPARIN SODIUM 5000 UNIT(S): 5000 INJECTION INTRAVENOUS; SUBCUTANEOUS at 14:17

## 2018-01-01 RX ADMIN — Medication 1 TABLET(S): at 05:38

## 2018-01-01 RX ADMIN — Medication 1 PUFF(S): at 01:48

## 2018-01-01 RX ADMIN — ALBUTEROL 1 PUFF(S): 90 AEROSOL, METERED ORAL at 21:10

## 2018-01-01 RX ADMIN — ALBUTEROL 1 PUFF(S): 90 AEROSOL, METERED ORAL at 14:25

## 2018-01-01 RX ADMIN — PROPOFOL 2.25 MICROGRAM(S)/KG/MIN: 10 INJECTION, EMULSION INTRAVENOUS at 22:50

## 2018-01-01 RX ADMIN — CHLORHEXIDINE GLUCONATE 15 MILLILITER(S): 213 SOLUTION TOPICAL at 05:08

## 2018-01-01 RX ADMIN — CHLORHEXIDINE GLUCONATE 15 MILLILITER(S): 213 SOLUTION TOPICAL at 05:06

## 2018-01-01 RX ADMIN — PROPOFOL 2.25 MICROGRAM(S)/KG/MIN: 10 INJECTION, EMULSION INTRAVENOUS at 20:00

## 2018-01-01 RX ADMIN — Medication 250 MILLIGRAM(S): at 06:22

## 2018-01-01 RX ADMIN — CHLORHEXIDINE GLUCONATE 15 MILLILITER(S): 213 SOLUTION TOPICAL at 17:31

## 2018-01-01 RX ADMIN — MEROPENEM 100 MILLIGRAM(S): 1 INJECTION INTRAVENOUS at 22:50

## 2018-01-01 RX ADMIN — ROBINUL 0.4 MILLIGRAM(S): 0.2 INJECTION INTRAMUSCULAR; INTRAVENOUS at 14:46

## 2018-01-01 RX ADMIN — Medication 5 MILLIGRAM(S): at 05:08

## 2018-01-01 RX ADMIN — Medication 250 MILLIGRAM(S): at 18:22

## 2018-01-01 RX ADMIN — Medication 5 MILLIGRAM(S): at 17:45

## 2018-01-01 RX ADMIN — Medication 35.11 MICROGRAM(S)/KG/MIN: at 22:46

## 2018-01-01 RX ADMIN — INSULIN HUMAN 10 UNIT(S): 100 INJECTION, SOLUTION SUBCUTANEOUS at 12:43

## 2018-01-01 RX ADMIN — MIDAZOLAM HYDROCHLORIDE 1 MG/HR: 1 INJECTION, SOLUTION INTRAMUSCULAR; INTRAVENOUS at 07:00

## 2018-01-01 RX ADMIN — Medication 250 MILLIGRAM(S): at 17:32

## 2018-01-01 RX ADMIN — Medication 250 MILLIGRAM(S): at 18:15

## 2018-01-01 RX ADMIN — Medication 1 TABLET(S): at 07:24

## 2018-01-01 RX ADMIN — PROPOFOL 2.25 MICROGRAM(S)/KG/MIN: 10 INJECTION, EMULSION INTRAVENOUS at 07:46

## 2018-01-01 RX ADMIN — PROPOFOL 2.25 MICROGRAM(S)/KG/MIN: 10 INJECTION, EMULSION INTRAVENOUS at 11:08

## 2018-01-01 RX ADMIN — CHLORHEXIDINE GLUCONATE 15 MILLILITER(S): 213 SOLUTION TOPICAL at 17:45

## 2018-01-01 RX ADMIN — Medication 1 PUFF(S): at 21:10

## 2018-01-01 RX ADMIN — HEPARIN SODIUM 5000 UNIT(S): 5000 INJECTION INTRAVENOUS; SUBCUTANEOUS at 06:20

## 2018-01-01 RX ADMIN — HEPARIN SODIUM 5000 UNIT(S): 5000 INJECTION INTRAVENOUS; SUBCUTANEOUS at 22:07

## 2018-01-01 RX ADMIN — FENTANYL CITRATE 50 MICROGRAM(S): 50 INJECTION INTRAVENOUS at 15:12

## 2018-01-01 RX ADMIN — PROPOFOL 2.25 MICROGRAM(S)/KG/MIN: 10 INJECTION, EMULSION INTRAVENOUS at 08:00

## 2018-01-01 RX ADMIN — Medication 5 MILLIGRAM(S): at 05:05

## 2018-01-01 RX ADMIN — ALBUTEROL 1 PUFF(S): 90 AEROSOL, METERED ORAL at 08:29

## 2018-01-01 RX ADMIN — SODIUM CHLORIDE 500 MILLILITER(S): 9 INJECTION INTRAMUSCULAR; INTRAVENOUS; SUBCUTANEOUS at 00:01

## 2018-01-01 RX ADMIN — ALBUTEROL 1 PUFF(S): 90 AEROSOL, METERED ORAL at 01:20

## 2018-01-01 RX ADMIN — HEPARIN SODIUM 5000 UNIT(S): 5000 INJECTION INTRAVENOUS; SUBCUTANEOUS at 22:52

## 2018-01-01 RX ADMIN — CHLORHEXIDINE GLUCONATE 15 MILLILITER(S): 213 SOLUTION TOPICAL at 05:07

## 2018-01-01 RX ADMIN — Medication 1 PUFF(S): at 08:27

## 2018-01-01 RX ADMIN — Medication 1 TABLET(S): at 17:45

## 2018-01-01 RX ADMIN — Medication 1 TABLET(S): at 18:41

## 2018-01-01 RX ADMIN — ALBUTEROL 1 PUFF(S): 90 AEROSOL, METERED ORAL at 13:07

## 2018-01-01 RX ADMIN — HEPARIN SODIUM 5000 UNIT(S): 5000 INJECTION INTRAVENOUS; SUBCUTANEOUS at 13:42

## 2018-01-01 RX ADMIN — HEPARIN SODIUM 5000 UNIT(S): 5000 INJECTION INTRAVENOUS; SUBCUTANEOUS at 05:42

## 2018-01-01 RX ADMIN — SODIUM POLYSTYRENE SULFONATE 30 GRAM(S): 4.1 POWDER, FOR SUSPENSION ORAL at 12:42

## 2018-01-01 RX ADMIN — Medication 1 PUFF(S): at 13:08

## 2018-01-01 RX ADMIN — SENNA PLUS 1 TABLET(S): 8.6 TABLET ORAL at 05:08

## 2018-01-01 RX ADMIN — Medication 50 MILLILITER(S): at 13:06

## 2018-01-01 RX ADMIN — Medication 1 PUFF(S): at 02:07

## 2018-01-01 RX ADMIN — FENTANYL CITRATE 50 MICROGRAM(S): 50 INJECTION INTRAVENOUS at 15:34

## 2018-01-01 RX ADMIN — Medication 1 PUFF(S): at 02:45

## 2018-01-01 RX ADMIN — MIDAZOLAM HYDROCHLORIDE 1 MG/HR: 1 INJECTION, SOLUTION INTRAMUSCULAR; INTRAVENOUS at 20:22

## 2018-01-01 RX ADMIN — DEXMEDETOMIDINE HYDROCHLORIDE IN 0.9% SODIUM CHLORIDE 3.75 MICROGRAM(S)/KG/HR: 4 INJECTION INTRAVENOUS at 11:13

## 2018-01-01 RX ADMIN — MICAFUNGIN SODIUM 105 MILLIGRAM(S): 100 INJECTION, POWDER, LYOPHILIZED, FOR SOLUTION INTRAVENOUS at 13:54

## 2018-01-01 RX ADMIN — MEROPENEM 100 MILLIGRAM(S): 1 INJECTION INTRAVENOUS at 23:58

## 2018-01-01 RX ADMIN — HEPARIN SODIUM 5000 UNIT(S): 5000 INJECTION INTRAVENOUS; SUBCUTANEOUS at 05:53

## 2018-01-01 RX ADMIN — FENTANYL CITRATE 50 MICROGRAM(S): 50 INJECTION INTRAVENOUS at 15:45

## 2018-01-01 RX ADMIN — PROPOFOL 2.25 MICROGRAM(S)/KG/MIN: 10 INJECTION, EMULSION INTRAVENOUS at 11:27

## 2018-01-01 RX ADMIN — ALBUTEROL 1 PUFF(S): 90 AEROSOL, METERED ORAL at 19:48

## 2018-01-01 RX ADMIN — Medication 250 MILLIGRAM(S): at 14:09

## 2018-01-01 RX ADMIN — LACTULOSE 30 GRAM(S): 10 SOLUTION ORAL at 17:38

## 2018-01-01 RX ADMIN — Medication 1 PUFF(S): at 19:26

## 2018-01-01 RX ADMIN — ALBUTEROL 1 PUFF(S): 90 AEROSOL, METERED ORAL at 02:45

## 2018-01-01 RX ADMIN — Medication 1 PUFF(S): at 08:22

## 2018-01-01 RX ADMIN — Medication 250 MILLIGRAM(S): at 05:33

## 2018-01-01 RX ADMIN — Medication 35.11 MICROGRAM(S)/KG/MIN: at 07:48

## 2018-01-01 RX ADMIN — Medication 250 MILLIGRAM(S): at 17:22

## 2018-01-01 RX ADMIN — MEROPENEM 100 MILLIGRAM(S): 1 INJECTION INTRAVENOUS at 22:29

## 2018-01-01 RX ADMIN — PANTOPRAZOLE SODIUM 40 MILLIGRAM(S): 20 TABLET, DELAYED RELEASE ORAL at 12:39

## 2018-01-01 RX ADMIN — MORPHINE SULFATE 4 MILLIGRAM(S): 50 CAPSULE, EXTENDED RELEASE ORAL at 22:55

## 2018-01-01 RX ADMIN — SODIUM CHLORIDE 200 MILLILITER(S): 9 INJECTION, SOLUTION INTRAVENOUS at 21:00

## 2018-01-01 RX ADMIN — HEPARIN SODIUM 5000 UNIT(S): 5000 INJECTION INTRAVENOUS; SUBCUTANEOUS at 22:26

## 2018-01-01 RX ADMIN — Medication 667 MILLIGRAM(S): at 12:48

## 2018-01-01 RX ADMIN — ALBUTEROL 1 PUFF(S): 90 AEROSOL, METERED ORAL at 20:19

## 2018-01-01 RX ADMIN — SODIUM CHLORIDE 75 MILLILITER(S): 9 INJECTION INTRAMUSCULAR; INTRAVENOUS; SUBCUTANEOUS at 11:08

## 2018-01-01 RX ADMIN — ALBUTEROL 1 PUFF(S): 90 AEROSOL, METERED ORAL at 08:49

## 2018-01-01 RX ADMIN — Medication 25 MILLIGRAM(S): at 00:03

## 2018-01-01 RX ADMIN — Medication 146.67 MILLIGRAM(S): at 05:41

## 2018-01-01 RX ADMIN — SENNA PLUS 1 TABLET(S): 8.6 TABLET ORAL at 06:21

## 2018-01-01 RX ADMIN — Medication 5 MILLIGRAM(S): at 16:43

## 2018-01-01 RX ADMIN — MEROPENEM 100 MILLIGRAM(S): 1 INJECTION INTRAVENOUS at 14:57

## 2018-01-01 RX ADMIN — DEXMEDETOMIDINE HYDROCHLORIDE IN 0.9% SODIUM CHLORIDE 3.75 MICROGRAM(S)/KG/HR: 4 INJECTION INTRAVENOUS at 07:23

## 2018-01-01 RX ADMIN — SENNA PLUS 1 TABLET(S): 8.6 TABLET ORAL at 05:16

## 2018-01-01 RX ADMIN — SODIUM CHLORIDE 2000 MILLILITER(S): 9 INJECTION, SOLUTION INTRAVENOUS at 22:18

## 2018-01-01 RX ADMIN — Medication 1 PUFF(S): at 08:29

## 2018-01-01 RX ADMIN — SENNA PLUS 1 TABLET(S): 8.6 TABLET ORAL at 17:39

## 2018-01-01 RX ADMIN — MICAFUNGIN SODIUM 105 MILLIGRAM(S): 100 INJECTION, POWDER, LYOPHILIZED, FOR SOLUTION INTRAVENOUS at 12:44

## 2018-01-01 RX ADMIN — Medication 250 MILLIGRAM(S): at 05:10

## 2018-01-01 RX ADMIN — DEXMEDETOMIDINE HYDROCHLORIDE IN 0.9% SODIUM CHLORIDE 3.75 MICROGRAM(S)/KG/HR: 4 INJECTION INTRAVENOUS at 22:47

## 2018-01-01 RX ADMIN — MIDAZOLAM HYDROCHLORIDE 0.5 MG/HR: 1 INJECTION, SOLUTION INTRAMUSCULAR; INTRAVENOUS at 12:12

## 2018-01-01 RX ADMIN — ALBUTEROL 1 PUFF(S): 90 AEROSOL, METERED ORAL at 08:26

## 2018-01-01 RX ADMIN — Medication 5 MILLIGRAM(S): at 06:20

## 2018-01-01 RX ADMIN — Medication 150 MILLIGRAM(S): at 22:00

## 2018-01-01 RX ADMIN — Medication 50 MILLILITER(S): at 12:55

## 2018-01-01 RX ADMIN — MEROPENEM 100 MILLIGRAM(S): 1 INJECTION INTRAVENOUS at 16:42

## 2018-01-01 RX ADMIN — LACTULOSE 30 GRAM(S): 10 SOLUTION ORAL at 18:58

## 2018-01-01 RX ADMIN — Medication 70.22 MICROGRAM(S)/KG/MIN: at 17:14

## 2018-01-01 RX ADMIN — CHLORHEXIDINE GLUCONATE 15 MILLILITER(S): 213 SOLUTION TOPICAL at 05:41

## 2018-01-01 RX ADMIN — MEROPENEM 100 MILLIGRAM(S): 1 INJECTION INTRAVENOUS at 22:39

## 2018-01-01 RX ADMIN — CHLORHEXIDINE GLUCONATE 15 MILLILITER(S): 213 SOLUTION TOPICAL at 17:39

## 2018-01-01 RX ADMIN — MEROPENEM 100 MILLIGRAM(S): 1 INJECTION INTRAVENOUS at 21:00

## 2018-01-01 RX ADMIN — Medication 35.11 MICROGRAM(S)/KG/MIN: at 08:35

## 2018-01-01 RX ADMIN — CHLORHEXIDINE GLUCONATE 15 MILLILITER(S): 213 SOLUTION TOPICAL at 18:14

## 2018-01-01 RX ADMIN — SODIUM CHLORIDE 75 MILLILITER(S): 9 INJECTION INTRAMUSCULAR; INTRAVENOUS; SUBCUTANEOUS at 06:23

## 2018-01-01 RX ADMIN — MICAFUNGIN SODIUM 105 MILLIGRAM(S): 100 INJECTION, POWDER, LYOPHILIZED, FOR SOLUTION INTRAVENOUS at 12:25

## 2018-01-01 RX ADMIN — FENTANYL CITRATE 14.98 MICROGRAM(S)/KG/HR: 50 INJECTION INTRAVENOUS at 20:22

## 2018-01-01 RX ADMIN — Medication 1 ENEMA: at 01:16

## 2018-01-01 RX ADMIN — FENTANYL CITRATE 14.98 MICROGRAM(S)/KG/HR: 50 INJECTION INTRAVENOUS at 08:00

## 2018-01-01 RX ADMIN — FENTANYL CITRATE 50 MICROGRAM(S): 50 INJECTION INTRAVENOUS at 15:22

## 2018-01-01 RX ADMIN — HEPARIN SODIUM 5000 UNIT(S): 5000 INJECTION INTRAVENOUS; SUBCUTANEOUS at 21:05

## 2018-01-01 RX ADMIN — Medication 1 PUFF(S): at 19:48

## 2018-01-01 RX ADMIN — HEPARIN SODIUM 5000 UNIT(S): 5000 INJECTION INTRAVENOUS; SUBCUTANEOUS at 13:29

## 2018-01-01 RX ADMIN — Medication 1 PUFF(S): at 14:25

## 2018-01-01 RX ADMIN — Medication 10 MILLIGRAM(S): at 18:24

## 2018-01-01 RX ADMIN — CHLORHEXIDINE GLUCONATE 15 MILLILITER(S): 213 SOLUTION TOPICAL at 05:33

## 2018-01-01 RX ADMIN — AZITHROMYCIN 255 MILLIGRAM(S): 500 TABLET, FILM COATED ORAL at 00:20

## 2018-01-01 RX ADMIN — Medication 250 MILLIGRAM(S): at 23:02

## 2018-01-01 RX ADMIN — Medication 667 MILLIGRAM(S): at 12:17

## 2018-01-01 RX ADMIN — Medication 250 MILLIGRAM(S): at 05:41

## 2018-01-01 RX ADMIN — MEROPENEM 100 MILLIGRAM(S): 1 INJECTION INTRAVENOUS at 14:38

## 2018-01-01 RX ADMIN — Medication 1 TABLET(S): at 18:58

## 2018-01-01 RX ADMIN — CHLORHEXIDINE GLUCONATE 15 MILLILITER(S): 213 SOLUTION TOPICAL at 05:42

## 2018-01-01 RX ADMIN — ALBUTEROL 1 PUFF(S): 90 AEROSOL, METERED ORAL at 02:07

## 2018-01-01 RX ADMIN — Medication 650 MILLIGRAM(S): at 01:44

## 2018-01-01 RX ADMIN — CHLORHEXIDINE GLUCONATE 15 MILLILITER(S): 213 SOLUTION TOPICAL at 06:35

## 2018-01-01 RX ADMIN — CHLORHEXIDINE GLUCONATE 15 MILLILITER(S): 213 SOLUTION TOPICAL at 17:21

## 2018-01-01 RX ADMIN — Medication 146.67 MILLIGRAM(S): at 05:53

## 2018-01-01 RX ADMIN — MIDAZOLAM HYDROCHLORIDE 0.5 MG/HR: 1 INJECTION, SOLUTION INTRAMUSCULAR; INTRAVENOUS at 08:31

## 2018-03-08 NOTE — ED PROVIDER NOTE - PHYSICAL EXAMINATION
VITAL SIGNS: I have reviewed nursing notes and confirm.  CONSTITUTIONAL: cachetic male, pale  SKIN: poor skin turgor  HEAD: NCAT  EYES: EOMI, PERRLA, + severe pallor  ENT: mucous membranes dry,  normal pharynx with no erythema or exudates  NECK: no meningeal signs  CARD: RRR, no murmurs, rubs or gallops  RESP: bibasilar rhonchi and crackles (L>R)  ABD: soft, + BS, non-tender, non-distended, no rebound or guarding. + right posterolateral rib tenderness  EXT: Full ROM, + muscle wasting  NEURO: moves all extremities.  Cannot walk unassisted due to weakness. CN II-XII intact.   PSYCH: Cooperative, appropriate.

## 2018-03-08 NOTE — ED PROVIDER NOTE - OBJECTIVE STATEMENT
66 y/o M PMH Multiple Myeloma, Anemia who presents with weakness, SOB.  Patient had a dry cough and felt like he was sick for the past 2 weeks and this worsened over the past 2 days.  Patient is unable to walk without being extremely dyspneic.  Patient is weak and not eating and not drinking well. Patient also has bone pain in his right flank.  Patient has not seen a doctor in numerous years and is not on medication.  Family felt he looked pale and weak. Patient denies any hematuria, melena or hematochezia

## 2018-03-08 NOTE — ED ADULT NURSE NOTE - OBJECTIVE STATEMENT
patient presents to the ED with a complaint of weakness and shortness of breath at rest x2 weeks, worsened over the last 2 days. also complaining of severe back pain. patient with poor PO intake, not urinating as frequently. denies any sick contacts, blood in the stool, abdominal pain, chest pain, fever.

## 2018-03-08 NOTE — ED PROVIDER NOTE - CRITICAL CARE PROVIDED
additional history taking/consultation with other physicians/conducted a detailed discussion of DNR status/interpretation of diagnostic studies/documentation/consult w/ pt's family directly relating to pts condition/direct patient care (not related to procedure)

## 2018-03-08 NOTE — ED PROVIDER NOTE - NS ED ROS FT
Constitutional:  No fever, + lethargy  Eyes:  Patient reports intermittent floaters x 2 episodes  ENMT: No nasal discharge, no toothache, no sore throat. No neck pain or stiffness  Cardiac:  No chest pain  Respiratory:  + cough, + SOB, + severe dyspnea with exertion  GI:  + constipation, decreased PO intake  :  No dysuria, frequency or burning.  MS:  right flank pain  Neuro:  No headache. diffuse weakness  Skin:  No skin rash  Except as documented in the HPI,  all other systems are negative

## 2018-03-08 NOTE — ED PROVIDER NOTE - CARE PLAN
Principal Discharge DX:	Anemia, unspecified type  Secondary Diagnosis:	Acute renal failure, unspecified acute renal failure type  Secondary Diagnosis:	Septic shock

## 2018-03-08 NOTE — ED PROVIDER NOTE - PROGRESS NOTE DETAILS
Patient extremely weak and Hb 4.4.  Patient in renal failure.  Will move up to Critical Care.  Renal STAT consulted.  Patient pending blood transfusion.  CXR shows b/l infiltrates worse on right side.  Concern for possible sepsis and multifocal pneumonia vs cancerous spread/MM.  Lactate 6.  Will hydrate. Patient consented for blood. Guaiac done and negative. Patient being started on BIPAP.  Patient will require ICU evaluation.  Will get non contrast CT scan of chest and abd/pelvis for better evaluation. Case endorsed in person to Critical Care team (Elizabeth/Guera).  Patient and his sister patient w severe anemia, rich, pulm infiltrates, pancytopenia, lactemia. pending renal consult, ct chest/abd, blood transfusion. on bipap. moved to crit for more intensive care. will  continue to asses, consult icu. received from Dr. Woodruff. patient signed out to Dr. Dodge - pending above workup.

## 2018-03-08 NOTE — ED PROVIDER NOTE - MEDICAL DECISION MAKING DETAILS
see notes.  Patient has profound symptomatic anemia and was found to be in renal failure. Concern for multifocal pneumonia and septic shock with elevated lactate vs metastatic disease.  Patient going for Ct scan for better idea of underlying pathology.  Patient in critical care on BIPAP and gettign trasnfused blood.  Alba being placed for ARF and Renal STAT consulted.  Will consult ICU.  Will repeat lactate when appropriate.

## 2018-03-08 NOTE — ED ADULT NURSE REASSESSMENT NOTE - NS ED NURSE REASSESS COMMENT FT1
placed on bipap, breathing improved. 1st unit of PRBCS started. tolerating well, no signs/symptoms of transfusion reaction. continuous cardiac, pulse ox, and blood pressure monitoring in place. will closely monitor and assess. sister and patient updated with plan of care.

## 2018-03-09 NOTE — ED ADULT NURSE REASSESSMENT NOTE - NS ED NURSE REASSESS COMMENT FT1
patient developed fever after first 15 minutes of 2nd unit of PRBCS. MD Lynne aware and assessed patient at bedside, all blood sent to blood bank for transfusion reaction. continuous cardiac and pulse ox monitoring in place. will closely monitor and assess. pt in no distress, at baseline. patient developed fever after first 15 minutes of 2nd unit of PRBCS. MD Lynne aware and assessed patient at bedside, all blood sent to blood bank for transfusion reaction. continuous cardiac and pulse ox monitoring in place. will closely monitor and assess. pt in no distress, at baseline. blood was immediately stopped and sent to lab at sign of fever.

## 2018-03-09 NOTE — H&P ADULT - HISTORY OF PRESENT ILLNESS
68 y/o M PMH Multiple Myeloma, Anemia who presents with weakness, SOB.  Patient had a dry cough and felt like he was sick for the past 2 weeks and this worsened over the past 2 days.  Patient is unable to walk without being extremely dyspneic , dyspnea gradually progressed to be at rest. Patient had chills in the last 2 days Patient is weak and not eating and not drinking well. Patient also has bone pain in his right flank.  Patient has not seen a doctor in numerous years and is not on medication.  Family felt he looked pale and weak. Patient denies any hematuria, melena or hematochezia. Patient is on iron tablets since 5 years, since then his stool is dark, with no recent change. Patient denies any alteration in bowl movements no nausea or vomiting, but there is decrease in PO intake. In the ER patient received one unit of blood , when he was received the second unit, he spiked a tem of 38.5, no change in hemodynamics, work up for transfusion reaction is sent. Patient was not taking medication or following up with any doctor cause he believes in herbal medicine only.

## 2018-03-09 NOTE — CONSULT NOTE ADULT - SUBJECTIVE AND OBJECTIVE BOX
HPI:  66 y/o M PMH Multiple Myeloma, Anemia who presented  with weakness/ SOB for past 2 weeks  , worsened over the past 2 days.  Also a/w chills , decreased po intake , bone pain in his right flank.  Patient was  Patient has not seen a doctor in numerous years and is not on medication.  Family felt he looked pale and weak. Patient denies any hematuria, melena or hematochezia. Patient is on iron tablets since 5 years, since then his stool is dark, with no recent change. Patient denies any alteration in bowl movements no nausea or vomiting, but there is decrease in PO intake. In the ER patient received one unit of blood , when he was received the second unit, he spiked a tem of 38.5, no change in hemodynamics, work up for transfusion reaction is sent. Patient was not taking medication or following up with any doctor cause he believes in herbal medicine only. (09 Mar 2018 05:52)       ROS:  Negative except for:    PAST MEDICAL & SURGICAL HISTORY:  Anemia, unspecified type  Multiple myeloma, remission status unspecified      SOCIAL HISTORY:    FAMILY HISTORY:      MEDICATIONS  (STANDING):  fentaNYL   Infusion 2 MICROgram(s)/kG/Hr (14.98 mL/Hr) IV Continuous <Continuous>  heparin  Injectable 5000 Unit(s) SubCutaneous every 8 hours  influenza   Vaccine 0.5 milliLiter(s) IntraMuscular once  meropenem  IVPB 1000 milliGRAM(s) IV Intermittent every 24 hours  pantoprazole  Injectable 40 milliGRAM(s) IV Push daily  propofol Infusion 5 MICROgram(s)/kG/Min (2.247 mL/Hr) IV Continuous <Continuous>  sodium chloride 0.9%. 1000 milliLiter(s) (75 mL/Hr) IV Continuous <Continuous>  sodium polystyrene sulfonate Suspension 30 Gram(s) Oral once  vancomycin  IVPB 750 milliGRAM(s) IV Intermittent once    MEDICATIONS  (PRN):    Height (cm): 176.53 (03-09-18 @ 05:10)  Weight (kg): 74.9 (03-09-18 @ 05:10)  BMI (kg/m2): 24 (03-09-18 @ 05:10)  BSA (m2): 1.91 (03-09-18 @ 05:10)  Allergies    No Known Allergies    Intolerances        Vital Signs Last 24 Hrs  T(C): 36.9 (09 Mar 2018 11:52), Max: 39.2 (09 Mar 2018 02:55)  T(F): 98.5 (09 Mar 2018 11:52), Max: 102.6 (09 Mar 2018 02:55)  HR: 78 (09 Mar 2018 11:52) (69 - 108)  BP: 94/53 (09 Mar 2018 11:52) (94/52 - 165/82)  BP(mean): 65 (09 Mar 2018 11:52) (65 - 95)  RR: 20 (09 Mar 2018 11:52) (18 - 41)  SpO2: 100% (09 Mar 2018 11:52) (83% - 100%)    PHYSICAL EXAM  General: adult in NAD  HEENT: clear oropharynx, anicteric sclera, pink conjunctiva  Neck: supple  CV: normal S1/S2 with no murmur rubs or gallops  Lungs: positive air movement b/l ant lungs,clear to auscultation, no wheezes, no rales  Abdomen: soft non-tender non-distended, no hepatosplenomegaly  Ext: no clubbing cyanosis or edema  Skin: no rashes and no petechiae  Neuro: alert and oriented X 4, no focal deficits      LABS:                          4.6    7.08  )-----------( 138      ( 09 Mar 2018 11:03 )             15.3         Mean Cell Volume : 95.6 fL  Mean Cell Hemoglobin : 28.8 pg  Mean Cell Hemoglobin Concentration : 30.1 g/dL  Auto Neutrophil # : 5.74 K/uL  Auto Lymphocyte # : 0.70 K/uL  Auto Monocyte # : 0.11 K/uL  Auto Eosinophil # : 0.01 K/uL  Auto Basophil # : 0.02 K/uL  Auto Neutrophil % : 81.0 %  Auto Lymphocyte % : 9.9 %  Auto Monocyte % : 1.6 %  Auto Eosinophil % : 0.1 %  Auto Basophil % : 0.3 %      Serial CBC's  03-09 @ 11:03  Hct-15.3 / Hgb-4.6 / Plat-138 / RBC-1.60 / WBC-7.08  Serial CBC's  03-09 @ 02:35  Hct--- / Hgb--- / Plat--- / RBC-1.88 / WBC---  Serial CBC's  03-08 @ 19:53  Hct-14.9 / Hgb-4.4 / Plat-152 / RBC-1.53 / WBC-2.69      03-09    138  |  98  |  126<HH>  ----------------------------<  97  6.1<HH>   |  15<L>  |  8.5<HH>    Ca    9.7      09 Mar 2018 11:03    TPro  x   /  Alb  x   /  TBili  1.0  /  DBili  0.2  /  AST  x   /  ALT  x   /  AlkPhos  x   03-09      PT/INR - ( 09 Mar 2018 11:03 )   PT: 16.80 sec;   INR: 1.54 ratio         PTT - ( 09 Mar 2018 11:03 )  PTT:55.6 sec    Iron - Total Binding Capacity.: 135 ug/mL (03-09 @ 11:03)  Reticulocyte Percent: 1.2 % (03-09 @ 02:35)              BLOOD SMEAR INTERPRETATION:       RADIOLOGY & ADDITIONAL STUDIES:      Skeletal survey (2011) Impression: Two lucencies within the skull, may represent a venous lake or benign process, correlate clinically with laboratory values ? No other lytic lesions identified ? C5-6 C6-7 degenerative disk disease cervical spine ? Mild bilateral knee degenerative changes     Textual Result SEE TEXT (2011)   RESULTS FOR PATIENT - #[id number]  ARLEY COKER  [GENO/LA LT CHAIN]  [id number] - [id number]  IRASEMA: 07/27/11 1733  RECV: 07/27/11 1927       FREE KAPPA SERU                24.3   H    mg/L          (3.3-19.4)       FREE LAMBDA SER                 2.0   L    mg/L          (5.7-26.3)       FREE K/L RATIO                12.15   H                 (0.26-1.65)    Result Name Results Units Reference Range Textual Result SEE TEXT   [id number] - [id number]  IRASEMA: 07/27/11 1733  RECV: 07/27/11 1927       IgA                            2944   H    mg/dL           ()    IMMUNOFIX SERUM (()) IgA-Kappa monoclonal protein present. Test Performed at: [place] [address] unless otherwise noted.      Bone marrow biopsy: 8/2011: Palmacytosis with increased kappa light chains. HPI:    68 y/o M PMH Multiple Myeloma, Anemia who presented  with weakness/ SOB for past 2 weeks  , worsened over the past 2 days.  Also a/w chills , decreased po intake , bone pain in his right flank.    Patient was admitted with diagnosis of Sepsis due to multifocal pneumonia, s/p intubation this morning.  As per conversation with sister, patient was diagnosed with MM in 2011 (additional studies from 2011 posted below). He participated  in clinical trial at [facility]  but the trial was inefficient and was stopped prematurely.  Patient has not seen a doctor in numerous years and is not on any medication.      Patient intubated and sedated at this point. Unable to obtain detail ROS at this point due to acute illness.    PAST MEDICAL & SURGICAL HISTORY:  Anemia, unspecified type  Multiple myeloma, remission status unspecified      MEDICATIONS  (STANDING):  fentaNYL   Infusion 2 MICROgram(s)/kG/Hr (14.98 mL/Hr) IV Continuous <Continuous>  heparin  Injectable 5000 Unit(s) SubCutaneous every 8 hours  influenza   Vaccine 0.5 milliLiter(s) IntraMuscular once  meropenem  IVPB 1000 milliGRAM(s) IV Intermittent every 24 hours  pantoprazole  Injectable 40 milliGRAM(s) IV Push daily  propofol Infusion 5 MICROgram(s)/kG/Min (2.247 mL/Hr) IV Continuous <Continuous>  sodium chloride 0.9%. 1000 milliLiter(s) (75 mL/Hr) IV Continuous <Continuous>  sodium polystyrene sulfonate Suspension 30 Gram(s) Oral once  vancomycin  IVPB 750 milliGRAM(s) IV Intermittent once    MEDICATIONS  (PRN):    Height (cm): 176.53 (03-09-18 @ 05:10)  Weight (kg): 74.9 (03-09-18 @ 05:10)  BMI (kg/m2): 24 (03-09-18 @ 05:10)  BSA (m2): 1.91 (03-09-18 @ 05:10)  Allergies    No Known Allergies    Intolerances        Vital Signs Last 24 Hrs  T(C): 36.9 (09 Mar 2018 11:52), Max: 39.2 (09 Mar 2018 02:55)  T(F): 98.5 (09 Mar 2018 11:52), Max: 102.6 (09 Mar 2018 02:55)  HR: 78 (09 Mar 2018 11:52) (69 - 108)  BP: 94/53 (09 Mar 2018 11:52) (94/52 - 165/82)  BP(mean): 65 (09 Mar 2018 11:52) (65 - 95)  RR: 20 (09 Mar 2018 11:52) (18 - 41)  SpO2: 100% (09 Mar 2018 11:52) (83% - 100%)    PHYSICAL EXAM  General: adult in NAD  HEENT: clear oropharynx, anicteric sclera, pink conjunctiva  Neck: supple  CV: normal S1/S2 with no murmur rubs or gallops  Lungs: positive air movement b/l ant lungs,clear to auscultation, no wheezes, no rales  Abdomen: soft non-tender non-distended, no hepatosplenomegaly  Ext: no clubbing cyanosis or edema  Skin: no rashes and no petechiae  Neuro: alert and oriented X 4, no focal deficits      LABS:                          4.6    7.08  )-----------( 138      ( 09 Mar 2018 11:03 )             15.3         Mean Cell Volume : 95.6 fL  Mean Cell Hemoglobin : 28.8 pg  Mean Cell Hemoglobin Concentration : 30.1 g/dL  Auto Neutrophil # : 5.74 K/uL  Auto Lymphocyte # : 0.70 K/uL  Auto Monocyte # : 0.11 K/uL  Auto Eosinophil # : 0.01 K/uL  Auto Basophil # : 0.02 K/uL  Auto Neutrophil % : 81.0 %  Auto Lymphocyte % : 9.9 %  Auto Monocyte % : 1.6 %  Auto Eosinophil % : 0.1 %  Auto Basophil % : 0.3 %      Serial CBC's  03-09 @ 11:03  Hct-15.3 / Hgb-4.6 / Plat-138 / RBC-1.60 / WBC-7.08  Serial CBC's  03-09 @ 02:35  Hct--- / Hgb--- / Plat--- / RBC-1.88 / WBC---  Serial CBC's  03-08 @ 19:53  Hct-14.9 / Hgb-4.4 / Plat-152 / RBC-1.53 / WBC-2.69      03-09    138  |  98  |  126<HH>  ----------------------------<  97  6.1<HH>   |  15<L>  |  8.5<HH>    Ca    9.7      09 Mar 2018 11:03    TPro  x   /  Alb  x   /  TBili  1.0  /  DBili  0.2  /  AST  x   /  ALT  x   /  AlkPhos  x   03-09      PT/INR - ( 09 Mar 2018 11:03 )   PT: 16.80 sec;   INR: 1.54 ratio         PTT - ( 09 Mar 2018 11:03 )  PTT:55.6 sec    Iron - Total Binding Capacity.: 135 ug/mL (03-09 @ 11:03)  Reticulocyte Percent: 1.2 % (03-09 @ 02:35)    ADDITIONAL STUDIES:      Skeletal survey (2011) Impression: Two lucencies within the skull, may represent a venous lake or benign process, correlate clinically with laboratory values ? No other lytic lesions identified ? C5-6 C6-7 degenerative disk disease cervical spine ? Mild bilateral knee degenerative changes     Textual Result SEE TEXT (2011)   RESULTS FOR PATIENT - #[id number]  ARLEY COKER  [GENO/LA LT CHAIN]  0727:[id number] - [id number]  IRASEMA: 07/27/11 1733  RECV: 07/27/11 1927       FREE KAPPA SERU                24.3   H    mg/L          (3.3-19.4)       FREE LAMBDA SER                 2.0   L    mg/L          (5.7-26.3)       FREE K/L RATIO                12.15   H                 (0.26-1.65)    Result Name Results Units Reference Range Textual Result SEE TEXT   [id number] - [id number]  IRASEMA: 07/27/11 1733  RECV: 07/27/11 1927       IgA                            2944   H    mg/dL           ()    IMMUNOFIX SERUM (()) IgA-Kappa monoclonal protein present. Test Performed at: [place] [address] unless otherwise noted.      Bone marrow biopsy: 8/2011: Palmacytosis with increased kappa light chains. HPI:    66 y/o M PMH Multiple Myeloma, Anemia who presented  with weakness/ SOB for past 2 weeks  , worsened over the past 2 days.  Also a/w chills , decreased po intake , bone pain in his right flank.    Patient was admitted with diagnosis of Sepsis due to multifocal pneumonia, s/p intubation this morning.  As per conversation with sister, patient was diagnosed with MM in 2011 (additional studies from 2011 posted below). He participated  in clinical trial at [facility]  but the trial was inefficient and was stopped prematurely.  Patient has not seen a doctor in numerous years and is not on any medication.      Patient intubated and sedated at this point. Unable to obtain detail ROS at this point due to acute illness.    PAST MEDICAL & SURGICAL HISTORY:  Anemia, unspecified type  Multiple myeloma, remission status unspecified      MEDICATIONS  (STANDING):  fentaNYL   Infusion 2 MICROgram(s)/kG/Hr (14.98 mL/Hr) IV Continuous <Continuous>  heparin  Injectable 5000 Unit(s) SubCutaneous every 8 hours  influenza   Vaccine 0.5 milliLiter(s) IntraMuscular once  meropenem  IVPB 1000 milliGRAM(s) IV Intermittent every 24 hours  pantoprazole  Injectable 40 milliGRAM(s) IV Push daily  propofol Infusion 5 MICROgram(s)/kG/Min (2.247 mL/Hr) IV Continuous <Continuous>  sodium chloride 0.9%. 1000 milliLiter(s) (75 mL/Hr) IV Continuous <Continuous>  sodium polystyrene sulfonate Suspension 30 Gram(s) Oral once  vancomycin  IVPB 750 milliGRAM(s) IV Intermittent once    MEDICATIONS  (PRN):    Height (cm): 176.53 (03-09-18 @ 05:10)  Weight (kg): 74.9 (03-09-18 @ 05:10)  BMI (kg/m2): 24 (03-09-18 @ 05:10)  BSA (m2): 1.91 (03-09-18 @ 05:10)  Allergies    No Known Allergies    Intolerances        Vital Signs Last 24 Hrs  T(C): 36.9 (09 Mar 2018 11:52), Max: 39.2 (09 Mar 2018 02:55)  T(F): 98.5 (09 Mar 2018 11:52), Max: 102.6 (09 Mar 2018 02:55)  HR: 78 (09 Mar 2018 11:52) (69 - 108)  BP: 94/53 (09 Mar 2018 11:52) (94/52 - 165/82)  BP(mean): 65 (09 Mar 2018 11:52) (65 - 95)  RR: 20 (09 Mar 2018 11:52) (18 - 41)  SpO2: 100% (09 Mar 2018 11:52) (83% - 100%)    PHYSICAL EXAM  General: adult in NAD  HEENT: clear oropharynx, anicteric sclera, pink conjunctiva  Neck: supple  CV: normal S1/S2 with no murmur rubs or gallops  Lungs: Intubated on MV . lungs,clear to auscultation, no wheezes, no rales  Abdomen: soft non-tender non-distended, no hepatosplenomegaly  Ext: no clubbing cyanosis or edema  Skin: no rashes and no petechiae  Neuro: sedated      LABS:                          4.6    7.08  )-----------( 138      ( 09 Mar 2018 11:03 )             15.3         Mean Cell Volume : 95.6 fL  Mean Cell Hemoglobin : 28.8 pg  Mean Cell Hemoglobin Concentration : 30.1 g/dL  Auto Neutrophil # : 5.74 K/uL  Auto Lymphocyte # : 0.70 K/uL  Auto Monocyte # : 0.11 K/uL  Auto Eosinophil # : 0.01 K/uL  Auto Basophil # : 0.02 K/uL  Auto Neutrophil % : 81.0 %  Auto Lymphocyte % : 9.9 %  Auto Monocyte % : 1.6 %  Auto Eosinophil % : 0.1 %  Auto Basophil % : 0.3 %      Serial CBC's  03-09 @ 11:03  Hct-15.3 / Hgb-4.6 / Plat-138 / RBC-1.60 / WBC-7.08  Serial CBC's  03-09 @ 02:35  Hct--- / Hgb--- / Plat--- / RBC-1.88 / WBC---  Serial CBC's  03-08 @ 19:53  Hct-14.9 / Hgb-4.4 / Plat-152 / RBC-1.53 / WBC-2.69      03-09    138  |  98  |  126<HH>  ----------------------------<  97  6.1<HH>   |  15<L>  |  8.5<HH>    Ca    9.7      09 Mar 2018 11:03    TPro  x   /  Alb  x   /  TBili  1.0  /  DBili  0.2  /  AST  x   /  ALT  x   /  AlkPhos  x   03-09      PT/INR - ( 09 Mar 2018 11:03 )   PT: 16.80 sec;   INR: 1.54 ratio         PTT - ( 09 Mar 2018 11:03 )  PTT:55.6 sec    Iron - Total Binding Capacity.: 135 ug/mL (03-09 @ 11:03)  Reticulocyte Percent: 1.2 % (03-09 @ 02:35)    ADDITIONAL STUDIES:      Skeletal survey (2011) Impression: Two lucencies within the skull, may represent a venous lake or benign process, correlate clinically with laboratory values ? No other lytic lesions identified ? C5-6 C6-7 degenerative disk disease cervical spine ? Mild bilateral knee degenerative changes     Textual Result SEE TEXT (2011)   RESULTS FOR PATIENT - #[id number]  ARLEY COKER  [GENO/LA LT CHAIN]  [id number] - [id number]  IRASEMA: 07/27/11 1733  RECV: 07/27/11 1927       FREE KAPPA SERU                24.3   H    mg/L          (3.3-19.4)       FREE LAMBDA SER                 2.0   L    mg/L          (5.7-26.3)       FREE K/L RATIO                12.15   H                 (0.26-1.65)    Result Name Results Units Reference Range Textual Result SEE TEXT   [id number] - [id number]  IRASEMA: 07/27/11 1733  RECV: 07/27/11 1927       IgA                            2944   H    mg/dL           ()    IMMUNOFIX SERUM (()) IgA-Kappa monoclonal protein present. Test Performed at: [place] [address] unless otherwise noted.      Bone marrow biopsy: 8/2011: Palmacytosis with increased kappa light chains. HPI:    68 y/o M PMH Multiple Myeloma, Anemia who presented  with weakness/ SOB for past 2 weeks  , worsened over the past 2 days.  Also a/w chills , decreased po intake , bone pain in his right flank.    Patient was admitted with diagnosis of Sepsis due to multifocal pneumonia, s/p intubation this morning.  As per conversation with sister, patient was diagnosed with MM in 2011 (additional studies from 2011 posted below). He participated  in clinical trial at [facility]  but the trial was inefficient and was stopped prematurely.  Patient has not seen a doctor in numerous years and is not on any medication.      Patient intubated and sedated at this point. Unable to obtain detail ROS at this point due to acute illness.    PAST MEDICAL & SURGICAL HISTORY:  Anemia, unspecified type  Multiple myeloma, remission status unspecified  We found some records going back to 2011 from [facility] and placed in in our note.      MEDICATIONS  (STANDING):  fentaNYL   Infusion 2 MICROgram(s)/kG/Hr (14.98 mL/Hr) IV Continuous <Continuous>  heparin  Injectable 5000 Unit(s) SubCutaneous every 8 hours  influenza   Vaccine 0.5 milliLiter(s) IntraMuscular once  meropenem  IVPB 1000 milliGRAM(s) IV Intermittent every 24 hours  pantoprazole  Injectable 40 milliGRAM(s) IV Push daily  propofol Infusion 5 MICROgram(s)/kG/Min (2.247 mL/Hr) IV Continuous <Continuous>  sodium chloride 0.9%. 1000 milliLiter(s) (75 mL/Hr) IV Continuous <Continuous>  sodium polystyrene sulfonate Suspension 30 Gram(s) Oral once  vancomycin  IVPB 750 milliGRAM(s) IV Intermittent once    MEDICATIONS  (PRN):    Height (cm): 176.53 (03-09-18 @ 05:10)  Weight (kg): 74.9 (03-09-18 @ 05:10)  BMI (kg/m2): 24 (03-09-18 @ 05:10)  BSA (m2): 1.91 (03-09-18 @ 05:10)  Allergies    No Known Allergies    Intolerances        Vital Signs Last 24 Hrs  T(C): 36.9 (09 Mar 2018 11:52), Max: 39.2 (09 Mar 2018 02:55)  T(F): 98.5 (09 Mar 2018 11:52), Max: 102.6 (09 Mar 2018 02:55)  HR: 78 (09 Mar 2018 11:52) (69 - 108)  BP: 94/53 (09 Mar 2018 11:52) (94/52 - 165/82)  BP(mean): 65 (09 Mar 2018 11:52) (65 - 95)  RR: 20 (09 Mar 2018 11:52) (18 - 41)  SpO2: 100% (09 Mar 2018 11:52) (83% - 100%)    PHYSICAL EXAM  General: adult in NAD  HEENT: clear oropharynx, anicteric sclera, pink conjunctiva  Neck: supple  CV: normal S1/S2 with no murmur rubs or gallops  Lungs: Intubated on MV . lungs,clear to auscultation, no wheezes, no rales  Abdomen: soft non-tender non-distended, no hepatosplenomegaly  Ext: no clubbing cyanosis or edema  Skin: no rashes and no petechiae  Neuro: sedated      LABS:                          4.6    7.08  )-----------( 138      ( 09 Mar 2018 11:03 )             15.3         Mean Cell Volume : 95.6 fL  Mean Cell Hemoglobin : 28.8 pg  Mean Cell Hemoglobin Concentration : 30.1 g/dL  Auto Neutrophil # : 5.74 K/uL  Auto Lymphocyte # : 0.70 K/uL  Auto Monocyte # : 0.11 K/uL  Auto Eosinophil # : 0.01 K/uL  Auto Basophil # : 0.02 K/uL  Auto Neutrophil % : 81.0 %  Auto Lymphocyte % : 9.9 %  Auto Monocyte % : 1.6 %  Auto Eosinophil % : 0.1 %  Auto Basophil % : 0.3 %      Serial CBC's  03-09 @ 11:03  Hct-15.3 / Hgb-4.6 / Plat-138 / RBC-1.60 / WBC-7.08  Serial CBC's  03-09 @ 02:35  Hct--- / Hgb--- / Plat--- / RBC-1.88 / WBC---  Serial CBC's  03-08 @ 19:53  Hct-14.9 / Hgb-4.4 / Plat-152 / RBC-1.53 / WBC-2.69      03-09    138  |  98  |  126<HH>  ----------------------------<  97  6.1<HH>   |  15<L>  |  8.5<HH>    Ca    9.7      09 Mar 2018 11:03    TPro  x   /  Alb  x   /  TBili  1.0  /  DBili  0.2  /  AST  x   /  ALT  x   /  AlkPhos  x   03-09      PT/INR - ( 09 Mar 2018 11:03 )   PT: 16.80 sec;   INR: 1.54 ratio         PTT - ( 09 Mar 2018 11:03 )  PTT:55.6 sec    Iron - Total Binding Capacity.: 135 ug/mL (03-09 @ 11:03)  Reticulocyte Percent: 1.2 % (03-09 @ 02:35)    ADDITIONAL STUDIES:      Skeletal survey (2011) Impression: Two lucencies within the skull, may represent a venous lake or benign process, correlate clinically with laboratory values ? No other lytic lesions identified ? C5-6 C6-7 degenerative disk disease cervical spine ? Mild bilateral knee degenerative changes     Textual Result SEE TEXT (2011)   RESULTS FOR PATIENT - #[id number]  ARLEY COKER  [GENO/LA LT CHAIN]  [id number] - [id number]  IRASEMA: 07/27/11 1733  RECV: 07/27/11 1927       FREE KAPPA SERU                24.3   H    mg/L          (3.3-19.4)       FREE LAMBDA SER                 2.0   L    mg/L          (5.7-26.3)       FREE K/L RATIO                12.15   H                 (0.26-1.65)    Result Name Results Units Reference Range Textual Result SEE TEXT   [id number] - [id number]  IRASEMA: 07/27/11 1733  RECV: 07/27/11 1927       IgA                            2944   H    mg/dL           ()    IMMUNOFIX SERUM (()) IgA-Kappa monoclonal protein present. Test Performed at: [place] [address] unless otherwise noted.      Bone marrow biopsy: 8/2011: Palmacytosis with increased kappa light chains.

## 2018-03-09 NOTE — ED ADULT NURSE REASSESSMENT NOTE - NS ED NURSE REASSESS COMMENT FT1
temperature at this time 38.5 C rectal, MD Lynne aware. MD is assessing patient at bedside. patient remains on bipap. continuous cardiac, pulse ox, and blood pressure monitoring in place. fall risk precautions maintained. patient and family updated with plan of care. will closely monitor and assess.

## 2018-03-09 NOTE — CONSULT NOTE ADULT - SUBJECTIVE AND OBJECTIVE BOX
ARLEY COKER [id number]  67y Male    HPI:  66 y/o M PMH Multiple Myeloma, Anemia who presents with weakness, SOB.  Patient had a dry cough and felt like he was sick for the past 2 weeks and this worsened over the past 2 days.   He is noted to have severe anemia (Hb 4.4) s/p 1 unit PRBC; metabolic acidosis; oliguria. Patient was not taking medication or following up with any doctor cause he believes in herbal medicine only. Renal consult called for [place]. His serum Cr was 7.6 this morning, his baseline Cr was unknown. General surgery was consulted for [facility] placement for initiation of HD.     Pt was diagnosed with MM 2001    PAST MEDICAL & SURGICAL HISTORY:  Anemia, unspecified type  Multiple myeloma, remission status unspecified        MEDICATIONS  (STANDING):  heparin  Injectable 5000 Unit(s) SubCutaneous every 8 hours  influenza   Vaccine 0.5 milliLiter(s) IntraMuscular once  meropenem  IVPB 1000 milliGRAM(s) IV Intermittent every 24 hours  pantoprazole  Injectable 40 milliGRAM(s) IV Push daily  propofol Infusion 5 MICROgram(s)/kG/Min (2.247 mL/Hr) IV Continuous <Continuous>  sodium chloride 0.9%. 1000 milliLiter(s) (75 mL/Hr) IV Continuous <Continuous>  vancomycin  IVPB 750 milliGRAM(s) IV Intermittent once    MEDICATIONS  (PRN):      Allergies    No Known Allergies    Intolerances        REVIEW OF SYSTEMS    [ ] A ten-point review of systems was otherwise negative except as noted.  [ ] Due to altered mental status/intubation, subjective information were not able to be obtained from the patient. History was obtained, to the extent possible, from review of the chart and collateral sources of information.      Vital Signs Last 24 Hrs  T(C): 38.3 (09 Mar 2018 05:10), Max: 39.2 (09 Mar 2018 02:55)  T(F): 100.9 (09 Mar 2018 05:10), Max: 102.6 (09 Mar 2018 02:55)  HR: 87 (09 Mar 2018 05:10) (69 - 98)  BP: 141/69 (09 Mar 2018 05:10) (94/52 - 165/82)  BP(mean): 95 (09 Mar 2018 05:10) (95 - 95)  RR: 22 (09 Mar 2018 05:10) (18 - 22)  SpO2: 96% (09 Mar 2018 05:10) (83% - 100%)    PHYSICAL EXAM:  GENERAL: NAD, well-appearing  CHEST/LUNG: Clear to auscultation bilaterally  HEART: Regular rate and rhythm  ABDOMEN: Soft, Nontender, Nondistended; Bowel sounds present  EXTREMITIES:  No clubbing, cyanosis, or edema      LABS:  Labs:  CAPILLARY BLOOD GLUCOSE  91 (09 Mar 2018 05:10)                              4.4    2.69  )-----------( 152      ( 08 Mar 2018 19:53 )             14.9       Auto Neutrophil %: 40.0 % (18 @ 19:53)  Band Neutrophils %: 4.0 % (18 @ 19:53)        132<L>  |  95<L>  |  112<HH>  ----------------------------<  72  5.6<H>   |  15<L>  |  7.6<HH>      Calcium, Total Serum: 9.5 mg/dL (18 @ 05:49)  Calcium, Total Serum: 10.6 mg/dL (18 @ 19:53)      LFTs:             x    | 1.0  | x        ------------------[x       ( 09 Mar 2018 02:34 )  x    | 0.2  | x           Lipase:x      Amylase:x         Blood Gas Arterial, Lactate: 4.1 mmoL/L (18 @ 06:30)  Blood Gas Venous - Lactate: 5.4 mmoL/L (18 @ 03:41)  Lactate, Blood: 6.0 mmol/L (18 @ 20:34)  Blood Gas Venous - Lactate: 6.3 mmoL/L (18 @ 20:06)    ABG - ( 09 Mar 2018 06:30 )  pH: 7.38  /  pCO2: 29    /  pO2: 56    / HCO3: 17    / Base Excess: -7.3  /  SaO2: 87                Coags:     17.70  ----< 1.62    ( 08 Mar 2018 19:53 )     43.3            Urinalysis Basic - ( 09 Mar 2018 02:34 )    Color: Dark Yellow / Appearance: Turbid / S.020 / pH: x  Gluc: x / Ketone: Negative  / Bili: Negative / Urobili: 0.2 mg/dL   Blood: x / Protein: 100 mg/dL / Nitrite: Negative   Leuk Esterase: Negative / RBC: 10-25 /HPF / WBC x   Sq Epi: x / Non Sq Epi: Moderate /HPF / Bacteria: Few /HPF

## 2018-03-09 NOTE — PROCEDURE NOTE - NSPOSTPRCRAD_GEN_A_CORE
central line located in the/post-procedure radiography performed no pneumothorax/post-procedure radiography performed/located at the junction of SVC and RA/central line located in the

## 2018-03-09 NOTE — PROCEDURE NOTE - NSPOSTCAREGUIDE_GEN_A_CORE
Verbal/written post procedure instructions were given to patient/caregiver/Instructed patient/caregiver to follow-up with primary care physician
Care for catheter as per unit/ICU protocols

## 2018-03-09 NOTE — CONSULT NOTE ADULT - ASSESSMENT
67 year old male with multiple myeloma and worsening renal function.     Plan for [place] placement for HD.

## 2018-03-09 NOTE — CONSULT NOTE ADULT - ASSESSMENT
1. MADISON  2. metabolic acidosis  3. anemia   4. MM A 66 yo M with PMH of MM presented to hospital for SOB and weakness.     1. MADISON V/S CKD (unknown baseline Cr)  - check Urine pro/cr, urine electrolytes, US kidney and bladder, check UEPE, SEPE, SFL; check ph and iPTH  - keep alva, monitor BMP and U/O closely  - stop NS, start 1/2 NS + 75meq NaHCO3 75cc/hr IV.   - lasix 40mg IV stat   - keep MAP >65  - no nephrotoxic meds  - may need HD today    2. mixed high AG metabolic acidosis and respiratory alkalosis and PNA?  - Pulmo on board, plan to intubated and start MV  - will start bicarb drip, f/u ABG  -   3. anemia   4. MM  5. on vanco A 68 yo M with PMH of MM presented to hospital for SOB and weakness.     1. MADISON V/S CKD (unknown baseline Cr)  - check Urine pro/cr, urine electrolytes, US kidney and bladder, check UEPE, SEPE, SFL; check ph and iPTH  - keep alva, monitor BMP and U/O closely  - stop NS, start 1/2 NS + 75meq NaHCO3 75cc/hr IV.   - lasix 40mg IV stat   - keep MAP >65  - no nephrotoxic meds  - insert [place], will start HD today    2. mixed high AG metabolic acidosis and respiratory alkalosis and PNA?  - Pulmo on board, plan to intubated and start MV  - will start bicarb drip, f/u ABG  3. anemia   - check iron study, ferritin, folic acid, vit B12  - keep Hb >7.0   - f/u CBC. c/w hemo/onco  4. MM  - check UEPE, SEPE, SFL  - c/w onco/hemo  5. PNA?  - blood culture, pan culture   - c/w ID  - c/w antibiotics, check vanco trough level.    will follow A 66 yo M with PMH of MM presented to hospital for SOB and weakness.     1. Severe MADISON on  CKD (unknown baseline Cr., pt has not had blood work >5 yrs)  - oliguric  - etiology likely multiple myeloma, amyloidosis - needs w/u  - check Urine pro/cr, urine electrolytes, US kidney and bladder, check UPEP, UIF SPEP,SIF,  FLC;   - check for MBD - check ph and iPTH  - keep alva, monitor BMP and U/O closely  - stop NS, start 1/2 NS + 75meq NaHCO3 75cc/hr IV.   - lasix 40mg IV stat   - keep MAP >65  - no nephrotoxic meds  - insert [place], will start HD today    2. High AG metabolic acidosis    - due  to MADISON,sepsis  - will start bicarb drip, f/u ABG    3. Sepsis - multifocal PNA, leukopenia   - just intubated, started on MV  - blood culture, pan culture   - c/w ID  - c/w antibiotics, check vanco trough level.    4. Severe  anemia , leukopenia  - check iron study, ferritin, folic acid, vit B12, guiac  - r/o GIB - pt was taking NSAIDs for back pain  - keep Hb >7.0   - f/u CBC. c/w hemo/onc      will follow

## 2018-03-09 NOTE — CONSULT NOTE ADULT - SUBJECTIVE AND OBJECTIVE BOX
Patient is a 67y old  Male who presents with a chief complaint of sob, weakness (09 Mar 2018 05:52)      HPI:  68 y/o M PMH Multiple Myeloma, Anemia who presents with weakness, SOB.  Patient had a dry cough and felt like he was sick for the past 2 weeks and this worsened over the past 2 days.  Patient is unable to walk without being extremely dyspneic , dyspnea gradually progressed to be at rest. Patient had chills in the last 2 days Patient is weak and not eating and not drinking well. Patient also has bone pain in his right flank.  Patient has not seen a doctor in numerous years and is not on medication.  Family felt he looked pale and weak. Patient denies any hematuria, melena or hematochezia. Patient is on iron tablets since 5 years, since then his stool is dark, with no recent change. Patient denies any alteration in bowl movements no nausea or vomiting, but there is decrease in PO intake. In the ER patient received one unit of blood , when he was received the second unit, he spiked a tem of 38.5, no change in hemodynamics, work up for transfusion reaction is sent. Patient was not taking medication or following up with any doctor cause he believes in herbal medicine only. (09 Mar 2018 05:52)      PAST MEDICAL & SURGICAL HISTORY:  Anemia, unspecified type  Multiple myeloma, remission status unspecified      SOCIAL HX:   Smoking                         ETOH                            Other    FAMILY HISTORY:      REVIEW OF SYSTEMSsee h&p    General:	    Skin/Breast:  	  Ophthalmologic:  	  ENMT:	    Respiratory and Thorax:  	  Cardiovascular:	    Gastrointestinal:	    Genitourinary:	    Musculoskeletal:	    Neurological:	    Psychiatric:	    Hematology/Lymphatics:	    Endocrine:	    Allergic/Immunologic:	    Allergies    No Known Allergies    Intolerances          PHYSICAL EXAM    ICU Vital Signs Last 24 Hrs  T(C): 38.3 (09 Mar 2018 05:10), Max: 39.2 (09 Mar 2018 02:55)  T(F): 100.9 (09 Mar 2018 05:10), Max: 102.6 (09 Mar 2018 02:55)  HR: 87 (09 Mar 2018 05:10) (69 - 98)  BP: 141/69 (09 Mar 2018 05:10) (94/52 - 165/82)  BP(mean): 95 (09 Mar 2018 05:10) (95 - 95)  ABP: --  ABP(mean): --  RR: 22 (09 Mar 2018 05:10) (18 - 22)  SpO2: 96% (09 Mar 2018 05:10) (83% - 100%)            General:  HEENT:                Lymph Nodes:  Lungs: Bilateral BS  Cardiovascular: Regular  Abdomen: Soft, Positive BS  Extremities: No clubbing  Skin:   Neurological: a/a        LABS:                          4.4    2.69  )-----------( 152      ( 08 Mar 2018 19:53 )             14.9                                               03-    132<L>  |  95<L>  |  112<HH>  ----------------------------<  72  5.6<H>   |  15<L>  |  7.6<HH>    Ca    9.5      09 Mar 2018 05:49    TPro  x   /  Alb  x   /  TBili  1.0  /  DBili  0.2  /  AST  x   /  ALT  x   /  AlkPhos  x   -      PT/INR - ( 08 Mar 2018 19:53 )   PT: 17.70 sec;   INR: 1.62 ratio         PTT - ( 08 Mar 2018 19:53 )  PTT:43.3 sec                                       Urinalysis Basic - ( 09 Mar 2018 02:34 )    Color: Dark Yellow / Appearance: Turbid / S.020 / pH: x  Gluc: x / Ketone: Negative  / Bili: Negative / Urobili: 0.2 mg/dL   Blood: x / Protein: 100 mg/dL / Nitrite: Negative   Leuk Esterase: Negative / RBC: 10-25 /HPF / WBC x   Sq Epi: x / Non Sq Epi: Moderate /HPF / Bacteria: Few /HPF                                                  LIVER FUNCTIONS - ( 08 Mar 2018 19:53 )  Alb: 2.3 g/dL / Pro: 10.1 g/dL / ALK PHOS: 32 U/L / ALT: 35 U/L / AST: 65 U/L / GGT: x                                                                                                                                   ABG - ( 09 Mar 2018 06:30 )  pH: 7.38  /  pCO2: 29    /  pO2: 56    / HCO3: 17    / Base Excess: -7.3  /  SaO2: 87                  X-Rays                                                                                     ECHO    MEDICATIONS  (STANDING):  heparin  Injectable 5000 Unit(s) SubCutaneous every 8 hours  influenza   Vaccine 0.5 milliLiter(s) IntraMuscular once  meropenem  IVPB 1000 milliGRAM(s) IV Intermittent every 24 hours  sodium chloride 0.9%. 1000 milliLiter(s) (75 mL/Hr) IV Continuous <Continuous>  vancomycin  IVPB 750 milliGRAM(s) IV Intermittent once    MEDICATIONS  (PRN):

## 2018-03-09 NOTE — PROGRESS NOTE ADULT - ASSESSMENT
67 year male patient with history of MM diagnosed in 2011, was on trial medication in [facility] for 3years, then did not seek any medical care thereafter presented for SOB, cough , generalized weakness and decreased po intake since 2 weeks.    # sepsis secondary to community acquired pneumonia in immunosuppressed  patient   start patient on meropenem, vancomycin  will give one dose vancomycin for now, check trough after 24 hrs, adjust medication accordingly  check flu, RVP nasal MRSA/ speech and swallow evaluation  stop vancomycin if MRSA is negative  f/u panculture  continue with BiPAP for now  ABG after 1hr  repeat lactate at 11  continue with iv hydration for now    #bicytopenia (anemia/leucopenia) secondary to MM  anemia is multifactorial (CRF, MM)  check iron studies, folate , vitamin b12  patient received one unit of blood, had fever on the second unit  f/u transfusion reaction work up  once negative , resume transfusion  consult hemato/onchology    #rich versus ckd  no need for urgent dialysis now  nephology consulted  monitor urine out put, check urine potein/creatinie ratio, urine electrolytes, check renal ultrasound  hyperkalemia: ekg no changes  will check BMP stat, treat accordingly  if refractory hyperkalemia will reach to nephrology      #DVT prophylaxis: heparin  no evidence of active bleeding 67 year male patient with history of MM diagnosed in 2011, was on trial medication in [facility] for 3years, then did not seek any medical care thereafter presented for SOB, cough , generalized weakness and decreased po intake since 2 weeks.    #Septic Shock  2/2 PNA/ Acute hypoxic respiratory failure  - Intubated 3/9  - c/w Vanc/Meropenem/Levoquin  -f/u cultures  -On levophed    #Multiple Myeloma/Renal failure  - Starting HD, [place] today  - s/p 3U PRBC  - Four days of pulse Dexamethasone 40 MG IV per heme onc  - SPEP/UPEP, immunofixation serum, free light chains, IgA     #Anemia- Multifactorial   - Transfuse <7  -  Ferritin 1559, sat 21, Folate 13.9    #Elevated PT/PTT 2/2 Sepsis vs DIC   - D-dimer was 1490, Fibrinogen 567, not bleeding  - if bleeding  and PT/PTT>1.5 UPN  can administer 10-15ML of FFP as per heme onc  - Monitor PT/PTT and Fibrinogen if bleeding  - May need a dose of Vitamin K oral  1-2.5mg to prevent K deficiency since on antibiotics

## 2018-03-09 NOTE — H&P ADULT - NSHPPHYSICALEXAM_GEN_ALL_CORE
T(C): 38.3 (03-09-18 @ 05:10), Max: 39.2 (03-09-18 @ 02:55)  HR: 87 (03-09-18 @ 05:10) (69 - 98)  BP: 141/69 (03-09-18 @ 05:10) (94/52 - 165/82)  RR: 22 (03-09-18 @ 05:10) (18 - 22)  SpO2: 96% (03-09-18 @ 05:10) (83% - 100%)    PHYSICAL EXAM:  GENERAL: looks comfortable, not in distress on bipap  HEAD:  Atraumatic, Normocephalic  NECK: Supple, No JVD  CHEST/LUNG: basal left crackles  HEART: Regular rate and rhythm; No murmurs, rubs, or gallops  ABDOMEN: Soft, Nontender, Nondistended; Bowel sounds present, guaiac negative  EXTREMITIES:  no lower limb edema  NEUROLOGY: non-focal,  AAOx3  SKIN: No rashes or lesions

## 2018-03-09 NOTE — ED ADULT NURSE REASSESSMENT NOTE - NS ED NURSE REASSESS COMMENT FT1
temperature prior to second unit of PRBCS was 99.7 rectal, MD made aware and stated okay to administer blood. pt tolerating well, Tylenol given. continuous cardiac and pulse ox monitoring in place. fall risk precautions maintained. will continue to monitor and assess.

## 2018-03-09 NOTE — H&P ADULT - NSHPREVIEWOFSYSTEMS_GEN_ALL_CORE
REVIEW OF SYSTEMS:    CONSTITUTIONAL: generalized weakness, fever, chills  NECK: No pain or stiffness  RESPIRATORY: dry cough, SOB at rest  CARDIOVASCULAR: No chest pain or palpitations  GASTROINTESTINAL: No abdominal or epigastric pain. No nausea, vomiting, or hematemesis; No diarrhea or constipation. No melena or hematochezia.  GENITOURINARY: No dysuria, frequency or hematuria  NEUROLOGICAL: No numbness or weakness  SKIN: No itching, burning, rashes, or lesions   All other review of systems is negative unless indicated above.

## 2018-03-09 NOTE — CONSULT NOTE ADULT - ASSESSMENT
68 y/o M PMH Multiple Myeloma, Anemia who presented  with weakness/ SOB for past 2 weeks , worsened over the past 2 days, admitted with diagnosis of sepsis due to multifocal pneumonia/ acute hypoxemic respiratory failure requiring intubation.    #Severe sepsis 2/2 PNA/ Acute hypoxic respiratory failure  c/w iv bonita/vanc  f/u pancx    #History of Multiple Myeloma now in Severe Renal failure/ Metabolic acidosis:   may need dialysis/hco3 drip as per renal recommendations(renal following)  Myeloma workup:  check spep/upep; immunofixation; free light chains; immunoglobulin panel  may consider Dexamethasone 40 mg iv daily for 4 days if hemodynamically stable    #Anemia/thrombocytopenia: multifactorial   Multiple myeloma/acute illness/ r/o GI bleeding  iron studies/vit. b12/folate  consider DIC work up given elevated pt/PTT  check d-dimer/ fibrinogen/fdp 68 y/o M PMH Multiple Myeloma, Anemia who presented  with weakness/ SOB for past 2 weeks , worsened over the past 2 days, admitted with diagnosis of sepsis due to multifocal pneumonia/ acute hypoxemic respiratory failure requiring intubation.    #Severe sepsis 2/2 PNA/ Acute hypoxic respiratory failure  c/w iv bonita/vanc  f/u pancx    #History of Multiple Myeloma now in Severe Renal failure/ Metabolic acidosis:   may need dialysis/hco3 drip as per renal recommendations(renal following)  Myeloma workup:  check spep/upep; immunofixation; free light chains; immunoglobulin panel  may consider Dexamethasone 40 mg iv daily for 4 days if hemodynamically stable    #Anemia/thrombocytopenia: multifactorial   Multiple myeloma/acute illness/ r/o GI bleeding  keep hb>7. transfuse as needed  iron studies/vit. b12/folate  consider DIC work up given elevated pt/PTT  check d-dimer/ fibrinogen/fdp 66 y/o M PMH Multiple Myeloma, Anemia who presented  with weakness/ SOB for past 2 weeks , worsened over the past 2 days, admitted with diagnosis of sepsis due to multifocal pneumonia/ acute hypoxemic respiratory failure requiring intubation.    #Severe sepsis 2/2 PNA/ Acute hypoxic respiratory failure  c/w iv bonita/vanc  f/u pancx    #History of Multiple Myeloma now in Severe Renal failure/ Metabolic acidosis:   may need dialysis/hco3 drip as per renal recommendations(renal following)  Myeloma workup:  check spep/upep; immunofixation; free light chains; IgA for new baseline, will need Skeletal survey if his acute condition stabilizes   may consider Dexamethasone 40 mg iv daily for 4 days if hemodynamically stable this is mainly to save his kidneys and to start Myeloma under control. Will consider Velcade if he is stable     #Anemia/ multifactorial   Multiple myeloma/acute illness with bone marrow suppression  anemia in CKD  keep hb>7. transfuse as needed  iron studies/vit. b12/folate    #lEvated pt/PTT possibly from DIC due to sepsis   check d-dimer/ fibrinogen/fdp with PT/PTT, if bleeding can administer 10-15ML of FFP and if Fibrinogen is less than 100 can give Cryoprecipitate as well.    His prognosis is poor. Mortality is high given the acute situation.

## 2018-03-09 NOTE — PROGRESS NOTE ADULT - SUBJECTIVE AND OBJECTIVE BOX
PGY I NOTE    LENGTH OF HOSPITAL STAY:      CHIEF COMPLAINT:Patient is a 67y old  Male who presents with a chief complaint of sob, weakness (09 Mar 2018 05:52)    HPI:HPI:  66 y/o M PMH Multiple Myeloma, Anemia who presents with weakness, SOB.  Patient had a dry cough and felt like he was sick for the past 2 weeks and this worsened over the past 2 days.  Patient is unable to walk without being extremely dyspneic , dyspnea gradually progressed to be at rest. Patient had chills in the last 2 days Patient is weak and not eating and not drinking well. Patient also has bone pain in his right flank.  Patient has not seen a doctor in numerous years and is not on medication.  Family felt he looked pale and weak. Patient denies any hematuria, melena or hematochezia. Patient is on iron tablets since 5 years, since then his stool is dark, with no recent change. Patient denies any alteration in bowl movements no nausea or vomiting, but there is decrease in PO intake. In the ER patient received one unit of blood , when he was received the second unit, he spiked a tem of 38.5, no change in hemodynamics, work up for transfusion reaction is sent. Patient was not taking medication or following up with any doctor cause he believes in herbal medicine only. (09 Mar 2018 05:52)    OVERNIGHT EVENTS/INTERVAL UPDATES:    PMH & PSH  PAST MEDICAL & SURGICAL HISTORY:  Anemia, unspecified type  Multiple myeloma, remission status unspecified    SOCIAL HISTORY: Negative    ALLERGIES: No Known Allergies    HOME MEDICATIONS  Home Medications:    PHYSICAL EXAM:  T(F): 98.5 (18 @ 11:52), Max: 102.6 (18 @ 02:55)  HR: 84 (18 @ 13:52)  BP: 94/53 (18 @ 13:52)  RR: 16 (18 @ 13:52)  SpO2: 100% (18 @ 13:52)  CAPILLARY BLOOD GLUCOSE  126 (09 Mar 2018 11:52)  91 (09 Mar 2018 05:10)          18 @ 07:01  -  18 @ 07:00  --------------------------------------------------------  IN:    Packed Red Blood Cells: 279 mL    sodium chloride 0.9%.: 150 mL  Total IN: 429 mL    OUT:    Indwelling Catheter - Urethral: 60 mL  Total OUT: 60 mL    Total NET: 369 mL      18 @ 07:01  -  18 @ 14:14  --------------------------------------------------------  IN:    fentaNYL  Infusion: 37.5 mL    IV PiggyBack: 100 mL    propofol Infusion: 44.5 mL    sodium chloride 0.9%.: 525 mL  Total IN: 707 mL    OUT:    Indwelling Catheter - Urethral: 125 mL  Total OUT: 125 mL    Total NET: 582 mL        Mode: AC/ CMV (Assist Control/ Continuous Mandatory Ventilation), RR (machine): 12, TV (machine): 500, FiO2: 100, PEEP: 8, ITime: 1, MAP: 13, PIP: 28    General: NAD  HEENT: NCAT  CV: RRR  RESP: CTAB  Abdominal: Soft, NTTP  Extremity: no c/c/e  Neuro: A&O x3    MEDICATIONS  STANDING MEDICATIONS  fentaNYL   Infusion 2 MICROgram(s)/kG/Hr IV Continuous <Continuous>  heparin  Injectable 5000 Unit(s) SubCutaneous every 8 hours  influenza   Vaccine 0.5 milliLiter(s) IntraMuscular once  meropenem  IVPB 1000 milliGRAM(s) IV Intermittent every 24 hours  pantoprazole  Injectable 40 milliGRAM(s) IV Push daily  propofol Infusion 5 MICROgram(s)/kG/Min IV Continuous <Continuous>  sodium chloride 0.9%. 1000 milliLiter(s) IV Continuous <Continuous>  vancomycin  IVPB 750 milliGRAM(s) IV Intermittent once    PRN MEDICATIONS    LABS:                        4.6    7.08  )-----------( 138      ( 09 Mar 2018 11:03 )             15.3                  138  |  98  |  126<HH>  ----------------------------<  97  6.1<HH>   |  15<L>  |  8.5<HH>    Ca    9.7      09 Mar 2018 11:03    TPro  x   /  Alb  x   /  TBili  1.0  /  DBili  0.2  /  AST  x   /  ALT  x   /  AlkPhos  x   03-09    LIVER FUNCTIONS - ( 08 Mar 2018 19:53 )  Alb: 2.3 g/dL / Pro: 10.1 g/dL / ALK PHOS: 32 U/L / ALT: 35 U/L / AST: 65 U/L / GGT: x                      PT/INR - ( 09 Mar 2018 11:03 )   PT: 16.80 sec;   INR: 1.54 ratio         PTT - ( 09 Mar 2018 11:03 )  PTT:55.6 sec  CARDIAC MARKERS ( 09 Mar 2018 11:03 )  0.58 ng/mL / x     / 280 U/L / x     / 3.4 ng/mL                  ABG - ( 09 Mar 2018 10:26 )  pH: 7.22  /  pCO2: 42    /  pO2: 103   / HCO3: 17    / Base Excess: -9.5  /  SaO2: 98                Urinalysis Basic - ( 09 Mar 2018 02:34 )    Color: Dark Yellow / Appearance: Turbid / S.020 / pH: x  Gluc: x / Ketone: Negative  / Bili: Negative / Urobili: 0.2 mg/dL   Blood: x / Protein: 100 mg/dL / Nitrite: Negative   Leuk Esterase: Negative / RBC: 10-25 /HPF / WBC x   Sq Epi: x / Non Sq Epi: Moderate /HPF / Bacteria: Few /HPF        IMAGING:      ASSESSMENT & PLANS:    CNS:    HEENT:    PULMONARY:    CARIOVASCULAR:    GI:    RENAL:    INFECTIOUS DISEASE:    HEMATOLOGICAL:    ENDOCRINE:    MUSCULOSKELETAL:

## 2018-03-09 NOTE — CONSULT NOTE ADULT - SUBJECTIVE AND OBJECTIVE BOX
NEPHROLOGY CONSULTATION NOTE    A 66 yo M with PMH listed below presented to hospital because of weakness and SOB. Pt had a dry cough and felt sick in the past 2 weeks, worsened for 2 days. severe anemia (Hb 4.4)    Patient was not taking medication or following up with any doctor cause he believes in herbal medicine only. Renal consult called for [facility]. His serum Cr was 7.6 this morning, his baseline Cr was unknown.         PAST MEDICAL & SURGICAL HISTORY:  Anemia, unspecified type  Multiple myeloma, remission status unspecified    Allergies:  No Known Allergies    Home Medications:    Hospital Medications:   MEDICATIONS  (STANDING):  heparin  Injectable 5000 Unit(s) SubCutaneous every 8 hours  influenza   Vaccine 0.5 milliLiter(s) IntraMuscular once  meropenem  IVPB 1000 milliGRAM(s) IV Intermittent every 24 hours  sodium chloride 0.9%. 1000 milliLiter(s) (75 mL/Hr) IV Continuous <Continuous>  vancomycin  IVPB 750 milliGRAM(s) IV Intermittent once      SOCIAL HISTORY:  Denies ETOH,Smoking,   FAMILY HISTORY:    REVIEW OF SYSTEMS:  CONSTITUTIONAL: No weakness, fevers or chills  EYES/ENT: No visual changes;  No vertigo or throat pain   NECK: No pain or stiffness  RESPIRATORY: No cough, wheezing, hemoptysis; No shortness of breath  CARDIOVASCULAR: No chest pain or palpitations.  GASTROINTESTINAL: No abdominal or epigastric pain. No nausea, vomiting, or hematemesis; No diarrhea or constipation. No melena or hematochezia.  GENITOURINARY: No dysuria, frequency, foamy urine, urinary urgency, incontinence or hematuria  NEUROLOGICAL: No numbness or weakness  SKIN: No itching, burning, rashes, or lesions   VASCULAR: No bilateral lower extremity edema.   All other review of systems is negative unless indicated above.    VITALS:  T(F): 100.9 (18 @ 05:10), Max: 102.6 (18 @ 02:55)  HR: 87 (18 @ 05:10)  BP: 141/69 (18 @ 05:10)Vital Signs Last 24 Hrs  BP: 141/69 (09 Mar 2018 05:10) (94/52 - 165/82)  RR: 22 (18 @ 05:10)  SpO2: 96% (18 @ 05:10)    - @ 07:01  -   @ 07:00  --------------------------------------------------------  IN: 429 mL / OUT: 60 mL / NET: 369 mL    Height (cm): 176.53 ( 05:10)  Weight (kg): 74.9 (:10)  BMI (kg/m2): 24 ( 05:10)  BSA (m2): 1.91 ( 05:10)    18 @ 07:01  -  18 @ 07:00  --------------------------------------------------------  IN: 0 mL / OUT: 60 mL / NET: -60 mL    08 Mar 2018 07:01  -  09 Mar 2018 07:00  --------------------------------------------------------  IN:    Packed Red Blood Cells: 279 mL    sodium chloride 0.9%.: 150 mL  Total IN: 429 mL    OUT:    Indwelling Catheter - Urethral: 60 mL  Total OUT: 60 mL    Total NET: 369 mL    PHYSICAL EXAM:  Constitutional: NAD  HEENT: anicteric sclera, oropharynx clear, MMM  Neck: No JVD  Respiratory: CTAB, no wheezes, rales or rhonchi  Cardiovascular: S1, S2, RRR  Gastrointestinal: BS+, soft, NT/ND  Extremities: No cyanosis or clubbing. No peripheral edema  Neurological: A/O x 3, no focal deficits  Psychiatric: Normal mood, normal affect  : No CVA tenderness. No alva.   Skin: No rashes    LABS:      132<L>  |  95<L>  |  112<HH>  ----------------------------<  72  5.6<H>   |  15<L>  |  7.6<HH>  Creatinine Trend: 7.6 <--, 8.2 <--  Potassium Trend: 5.6<--, 5.4<--  Ca    9.5      09 Mar 2018 05:49    TPro      /  Alb      /  TBili  1.0  /  DBili  0.2  /  AST      /  ALT      /  AlkPhos                              4.4    2.69  )-----------( 152      ( 08 Mar 2018 19:53 )             14.9   Blood Gas Profile - Arterial (18 @ 06:30)    pH, Arterial: 7.38    pCO2, Arterial: 29 mmHg    pO2, Arterial: 56 mmHg    HCO3, Arterial: 17 mmoL/L    Base Excess, Arterial: -7.3 mmoL/L    Oxygen Saturation, Arterial: 87 %  Activated Partial Thromboplastin Time in AM (18 @ 19:53)    Activated Partial Thromboplastin Time: 43.3 sec    Urine Studies:  Urinalysis Basic - ( 09 Mar 2018 02:34 )    Color: Dark Yellow / Appearance: Turbid / S.020 / pH:   Gluc:  / Ketone: Negative  / Bili: Negative / Urobili: 0.2 mg/dL   Blood:  / Protein: 100 mg/dL / Nitrite: Negative   Leuk Esterase: Negative / RBC: 10-25 /HPF / WBC    Sq Epi:  / Non Sq Epi: Moderate /HPF / Bacteria: Few /HPF    RADIOLOGY & ADDITIONAL STUDIES:    < from: Xray Chest 1 View AP/PA (18 @ 03:16) >    Impression:      Increasing bilateral lung opacities    < end of copied text >    < from: CT Chest No Cont (18 @ 00:54) >  Multifocal airspace opacities which may be related to multifocal   pneumonia in the correct clinical context.     Mildly prominent fluid-filled small bowel loopswithout distinct   transition point. This may be related to enteritis.    Osseous lucent lesions and overall demineralization compatible patient's   known multiple myeloma.    < end of copied text > NEPHROLOGY CONSULTATION NOTE    A 66 yo M with PMH listed below presented to hospital because of weakness and SOB. Pt had a dry cough and felt sick in the past 2 weeks, worsened for 2 days. severe anemia (Hb 4.4). Patient was not taking medication or following up with any doctor cause he believes in herbal medicine only. Renal consult called for [facility]. His serum Cr was 7.6 this morning, his baseline Cr was unknown.         PAST MEDICAL & SURGICAL HISTORY:  Anemia, unspecified type  Multiple myeloma, remission status unspecified    Allergies:  No Known Allergies    Home Medications:    Hospital Medications:   MEDICATIONS  (STANDING):  heparin  Injectable 5000 Unit(s) SubCutaneous every 8 hours  influenza   Vaccine 0.5 milliLiter(s) IntraMuscular once  meropenem  IVPB 1000 milliGRAM(s) IV Intermittent every 24 hours  sodium chloride 0.9%. 1000 milliLiter(s) (75 mL/Hr) IV Continuous <Continuous>  vancomycin  IVPB 750 milliGRAM(s) IV Intermittent once      SOCIAL HISTORY:  Denies ETOH,Smoking,   FAMILY HISTORY:    REVIEW OF SYSTEMS:  CONSTITUTIONAL: No weakness, fevers or chills  EYES/ENT: No visual changes;  No vertigo or throat pain   NECK: No pain or stiffness  RESPIRATORY: No cough, wheezing, hemoptysis; No shortness of breath  CARDIOVASCULAR: No chest pain or palpitations.  GASTROINTESTINAL: No abdominal or epigastric pain. No nausea, vomiting, or hematemesis; No diarrhea or constipation. No melena or hematochezia.  GENITOURINARY: No dysuria, frequency, foamy urine, urinary urgency, incontinence or hematuria  NEUROLOGICAL: No numbness or weakness  SKIN: No itching, burning, rashes, or lesions   VASCULAR: No bilateral lower extremity edema.   All other review of systems is negative unless indicated above.    VITALS:  T(F): 100.9 (18 @ 05:10), Max: 102.6 (18 @ 02:55)  HR: 87 (18 @ 05:10)  BP: 141/69 (18 @ 05:10)Vital Signs Last 24 Hrs  BP: 141/69 (09 Mar 2018 05:10) (94/52 - 165/82)  RR: 22 (18 @ 05:10)  SpO2: 96% (18 @ 05:10)    - @ 07: @ 07:00  --------------------------------------------------------  IN: 429 mL / OUT: 60 mL / NET: 369 mL    Height (cm): 176.53 ( 05:10)  Weight (kg): 74.9 (:10)  BMI (kg/m2): 24 ( 05:10)  BSA (m2): 1.91 ( 05:10)    18 @ 07:01  -  18 @ 07:00  --------------------------------------------------------  IN: 0 mL / OUT: 60 mL / NET: -60 mL    08 Mar 2018 07:01  -  09 Mar 2018 07:00  --------------------------------------------------------  IN:    Packed Red Blood Cells: 279 mL    sodium chloride 0.9%.: 150 mL  Total IN: 429 mL    OUT:    Indwelling Catheter - Urethral: 60 mL  Total OUT: 60 mL    Total NET: 369 mL    PHYSICAL EXAM:  Constitutional: NAD  HEENT: anicteric sclera, oropharynx clear, MMM  Neck: No JVD  Respiratory: CTAB, no wheezes, rales or rhonchi  Cardiovascular: S1, S2, RRR  Gastrointestinal: BS+, soft, NT/ND  Extremities: No cyanosis or clubbing. No peripheral edema  Neurological: A/O x 3, no focal deficits  Psychiatric: Normal mood, normal affect  : No CVA tenderness. No alva.   Skin: No rashes    LABS:      132<L>  |  95<L>  |  112<HH>  ----------------------------<  72  5.6<H>   |  15<L>  |  7.6<HH>  Creatinine Trend: 7.6 <--, 8.2 <--  Potassium Trend: 5.6<--, 5.4<--  Ca    9.5      09 Mar 2018 05:49    TPro      /  Alb      /  TBili  1.0  /  DBili  0.2  /  AST      /  ALT      /  AlkPhos                              4.4    2.69  )-----------( 152      ( 08 Mar 2018 19:53 )             14.9   Blood Gas Profile - Arterial (18 @ 06:30)    pH, Arterial: 7.38    pCO2, Arterial: 29 mmHg    pO2, Arterial: 56 mmHg    HCO3, Arterial: 17 mmoL/L    Base Excess, Arterial: -7.3 mmoL/L    Oxygen Saturation, Arterial: 87 %  Activated Partial Thromboplastin Time in AM (18 @ 19:53)    Activated Partial Thromboplastin Time: 43.3 sec  < from: 12 Lead ECG (18 @ 18:17) >  Diagnosis Line Normal sinus rhythm  Minimal voltage criteria for LVH, may be normal variant  Borderline ECG    < end of copied text >    Urine Studies:  Urinalysis Basic - ( 09 Mar 2018 02:34 )    Color: Dark Yellow / Appearance: Turbid / S.020 / pH:   Gluc:  / Ketone: Negative  / Bili: Negative / Urobili: 0.2 mg/dL   Blood:  / Protein: 100 mg/dL / Nitrite: Negative   Leuk Esterase: Negative / RBC: 10-25 /HPF / WBC    Sq Epi:  / Non Sq Epi: Moderate /HPF / Bacteria: Few /HPF    RADIOLOGY & ADDITIONAL STUDIES:    < from: Xray Chest 1 View AP/PA (18 @ 03:16) >    Impression:      Increasing bilateral lung opacities    < end of copied text >    < from: CT Chest No Cont (18 @ 00:54) >  Multifocal airspace opacities which may be related to multifocal   pneumonia in the correct clinical context.     Mildly prominent fluid-filled small bowel loopswithout distinct   transition point. This may be related to enteritis.    Osseous lucent lesions and overall demineralization compatible patient's   known multiple myeloma.    < end of copied text > NEPHROLOGY CONSULTATION NOTE    A 68 yo M with PMH listed below presented to hospital because of weakness and SOB. Pt had a dry cough and felt sick in the past 2 weeks, worsened for 2 days. severe anemia (Hb 4.4) s/p 1 unit PRBC; metabolic acidosis; oliguria. Patient was not taking medication or following up with any doctor cause he believes in herbal medicine only. Renal consult called for [facility]. His serum Cr was 7.6 this morning, his baseline Cr was unknown. Pt state he had a hx of taking NSAIDs.     (+) fever, SOB, back pain  no rash, hematuria, chest pain, abd pain.     PAST MEDICAL & SURGICAL HISTORY:  Anemia, unspecified type  Multiple myeloma, remission status unspecified    Allergies:  No Known Allergies    Home Medications:    Hospital Medications:   MEDICATIONS  (STANDING):  heparin  Injectable 5000 Unit(s) SubCutaneous every 8 hours  influenza   Vaccine 0.5 milliLiter(s) IntraMuscular once  meropenem  IVPB 1000 milliGRAM(s) IV Intermittent every 24 hours  sodium chloride 0.9%. 1000 milliLiter(s) (75 mL/Hr) IV Continuous <Continuous>  vancomycin  IVPB 750 milliGRAM(s) IV Intermittent once      SOCIAL HISTORY:  Denies ETOH,Smoking,   FAMILY HISTORY:    REVIEW OF SYSTEMS:  CONSTITUTIONAL: weakness, (+) fevers or chills  RESPIRATORY: No cough, wheezing, hemoptysis; (+) dry cough, shortness of breath.  CARDIOVASCULAR: No chest pain or palpitations.  GASTROINTESTINAL: No abdominal or epigastric pain. No nausea, vomiting, or hematemesis; No diarrhea or constipation. No melena or hematochezia.  GENITOURINARY: No dysuria, frequency, foamy urine, urinary urgency, incontinence or hematuria  VASCULAR: No bilateral lower extremity edema.     VITALS:  T(F): 100.9 (18 @ 05:10), Max: 102.6 (18 @ 02:55)  HR: 87 (18 @ 05:10)  BP: 141/69 (18 @ 05:10)Vital Signs Last 24 Hrs  BP: 141/69 (09 Mar 2018 05:10) (94/52 - 165/82)  RR: 22 (18 @ 05:10)  SpO2: 96% (18 @ 05:10)    03- @ 07: @ 07:00  --------------------------------------------------------  IN: 429 mL / OUT: 60 mL / NET: 369 mL    Height (cm): 176.53 ( 05:10)  Weight (kg): 74.9 (:10)  BMI (kg/m2): 24 ( 05:10)  BSA (m2): 1.91 ( 05:10)    18 @ 07:01  -  18 @ 07:00  --------------------------------------------------------  IN: 0 mL / OUT: 60 mL / NET: -60 mL    08 Mar 2018 07:01  -  09 Mar 2018 07:00  --------------------------------------------------------  IN:    Packed Red Blood Cells: 279 mL    sodium chloride 0.9%.: 150 mL  Total IN: 429 mL    OUT:    Indwelling Catheter - Urethral: 60 mL  Total OUT: 60 mL    Total NET: 369 mL    PHYSICAL EXAM:  Constitutional: NAD  HEENT: anicteric sclera, oropharynx clear, MMM  Neck: No JVD  Respiratory: left crackles.  Cardiovascular: S1, S2, RRR  Gastrointestinal: BS+, soft, NT/ND  Extremities: No cyanosis or clubbing. No peripheral edema  Neurological: A/O x 3, no focal deficits  Psychiatric: Normal mood, normal affect  : No CVA tenderness. positive Alba.     LABS:      132<L>  |  95<L>  |  112<HH>  ----------------------------<  72  5.6<H>   |  15<L>  |  7.6<HH>  Creatinine Trend: 7.6 <--, 8.2 <--  Potassium Trend: 5.6<--, 5.4<--  Ca    9.5      09 Mar 2018 05:49    TPro      /  Alb      /  TBili  1.0  /  DBili  0.2  /  AST      /  ALT      /  AlkPhos                              4.4    2.69  )-----------( 152      ( 08 Mar 2018 19:53 )             14.9   Blood Gas Profile - Arterial (18 @ 06:30)    pH, Arterial: 7.38    pCO2, Arterial: 29 mmHg    pO2, Arterial: 56 mmHg    HCO3, Arterial: 17 mmoL/L    Base Excess, Arterial: -7.3 mmoL/L    Oxygen Saturation, Arterial: 87 %  Blood Gas Arterial, Lactate (18 @ 06:30)    Blood Gas Arterial, Lactate: 4.1 mmoL/L      Activated Partial Thromboplastin Time in AM (18 @ 19:53)    Activated Partial Thromboplastin Time: 43.3 sec  < from: 12 Lead ECG (18 @ 18:17) >  Diagnosis Line Normal sinus rhythm  Minimal voltage criteria for LVH, may be normal variant  Borderline ECG    < end of copied text >    Urine Studies:  Urinalysis Basic - ( 09 Mar 2018 02:34 )    Color: Dark Yellow / Appearance: Turbid / S.020 / pH:   Gluc:  / Ketone: Negative  / Bili: Negative / Urobili: 0.2 mg/dL   Blood:  / Protein: 100 mg/dL / Nitrite: Negative   Leuk Esterase: Negative / RBC: 10-25 /HPF / WBC    Sq Epi:  / Non Sq Epi: Moderate /HPF / Bacteria: Few /HPF    RADIOLOGY & ADDITIONAL STUDIES:    < from: Xray Chest 1 View AP/PA (18 @ 03:16) >    Impression:      Increasing bilateral lung opacities    < end of copied text >    < from: CT Chest No Cont (18 @ 00:54) >  Multifocal airspace opacities which may be related to multifocal   pneumonia in the correct clinical context.     Mildly prominent fluid-filled small bowel loopswithout distinct   transition point. This may be related to enteritis.    Osseous lucent lesions and overall demineralization compatible patient's   known multiple myeloma.    < end of copied text > NEPHROLOGY CONSULTATION NOTE    A 66 yo M with PMH listed below presented to hospital because of weakness and SOB. Pt had a dry cough and felt sick in the past 2 weeks, worsened for 2 days. severe anemia (Hb 4.4) s/p 1 unit PRBC; metabolic acidosis; oliguria. Patient was not taking medication or following up with any doctor cause he believes in herbal medicine only. Renal consult called for [facility]. His serum Cr was 7.6 this morning, his baseline Cr was unknown. Pt state he had a hx of taking NSAIDs.     Pt was diagnosed with MM  by [facility], but never treated; used herbal meds instead for 3 hrs. He was working until a week ago when he developed cough and severe weakness. [facility]    (+) fever, SOB, back pain  no rash, hematuria, chest pain, abd pain.     PAST MEDICAL & SURGICAL HISTORY:  Anemia, unspecified type  Multiple myeloma, remission status unspecified    Allergies:  No Known Allergies    Home Medications:    Hospital Medications:   MEDICATIONS  (STANDING):  heparin  Injectable 5000 Unit(s) SubCutaneous every 8 hours  influenza   Vaccine 0.5 milliLiter(s) IntraMuscular once  meropenem  IVPB 1000 milliGRAM(s) IV Intermittent every 24 hours  sodium chloride 0.9%. 1000 milliLiter(s) (75 mL/Hr) IV Continuous <Continuous>  vancomycin  IVPB 750 milliGRAM(s) IV Intermittent once      SOCIAL HISTORY:  Denies ETOH,Smoking,   FAMILY HISTORY:    REVIEW OF SYSTEMS:  CONSTITUTIONAL: weakness, (+) fevers or chills  RESPIRATORY: No cough, wheezing, hemoptysis; (+) dry cough, shortness of breath.  CARDIOVASCULAR: No chest pain or palpitations.  GASTROINTESTINAL: No abdominal or epigastric pain. No nausea, vomiting, or hematemesis; No diarrhea or constipation. No melena or hematochezia.  GENITOURINARY: No dysuria, frequency, foamy urine, urinary urgency, incontinence or hematuria  VASCULAR: No bilateral lower extremity edema.     VITALS:  T(F): 100.9 (18 @ 05:10), Max: 102.6 (18 @ 02:55)  HR: 87 (18 @ 05:10)  BP: 141/69 (18 @ 05:10)Vital Signs Last 24 Hrs  BP: 141/69 (09 Mar 2018 05:10) (94/52 - 165/82)  RR: 22 (18 @ 05:10)  SpO2: 96% (18 @ 05:10)     07:01  -   @ 07:00  --------------------------------------------------------  IN: 429 mL / OUT: 60 mL / NET: 369 mL    Height (cm): 176.53 ( 05:10)  Weight (kg): 74.9 (:10)  BMI (kg/m2): 24 (:10)  BSA (m2): 1.91 ( 05:10)    18 @ 07:01  -  18 @ 07:00  --------------------------------------------------------  IN: 0 mL / OUT: 60 mL / NET: -60 mL    08 Mar 2018 07:01  -  09 Mar 2018 07:00  --------------------------------------------------------  IN:    Packed Red Blood Cells: 279 mL    sodium chloride 0.9%.: 150 mL  Total IN: 429 mL    OUT:    Indwelling Catheter - Urethral: 60 mL  Total OUT: 60 mL    Total NET: 369 mL    PHYSICAL EXAM:  Constitutional: NAD  HEENT: anicteric sclera  Neck: No JVD  Respiratory: left crackles.  Cardiovascular: S1, S2, RRR  Gastrointestinal: BS+, soft, NT/ND  Extremities: No cyanosis or clubbing. No peripheral edema  Neurological: A/O x 3, no focal deficits  Psychiatric: Normal mood, normal affect  : No CVA tenderness. positive Alba.     LABS:      132<L>  |  95<L>  |  112<HH>  ----------------------------<  72  5.6<H>   |  15<L>  |  7.6<HH>  Creatinine Trend: 7.6 <--, 8.2 <--  Potassium Trend: 5.6<--, 5.4<--  Ca    9.5      09 Mar 2018 05:49    TPro      /  Alb      /  TBili  1.0  /  DBili  0.2  /  AST      /  ALT      /  AlkPhos                              4.4    2.69  )-----------( 152      ( 08 Mar 2018 19:53 )             14.9   Blood Gas Profile - Arterial (18 @ 06:30)    pH, Arterial: 7.38    pCO2, Arterial: 29 mmHg    pO2, Arterial: 56 mmHg    HCO3, Arterial: 17 mmoL/L    Base Excess, Arterial: -7.3 mmoL/L    Oxygen Saturation, Arterial: 87 %  Blood Gas Arterial, Lactate (18 @ 06:30)    Blood Gas Arterial, Lactate: 4.1 mmoL/L      Activated Partial Thromboplastin Time in AM (18 @ 19:53)    Activated Partial Thromboplastin Time: 43.3 sec  < from: 12 Lead ECG (18 @ 18:17) >  Diagnosis Line Normal sinus rhythm  Minimal voltage criteria for LVH, may be normal variant  Borderline ECG    < end of copied text >    Urine Studies:  Urinalysis Basic - ( 09 Mar 2018 02:34 )    Color: Dark Yellow / Appearance: Turbid / S.020 / pH:   Gluc:  / Ketone: Negative  / Bili: Negative / Urobili: 0.2 mg/dL   Blood:  / Protein: 100 mg/dL / Nitrite: Negative   Leuk Esterase: Negative / RBC: 10-25 /HPF / WBC    Sq Epi:  / Non Sq Epi: Moderate /HPF / Bacteria: Few /HPF    RADIOLOGY & ADDITIONAL STUDIES:    < from: Xray Chest 1 View AP/PA (18 @ 03:16) >    Impression:      Increasing bilateral lung opacities    < end of copied text >    < from: CT Chest No Cont (18 @ 00:54) >  Multifocal airspace opacities which may be related to multifocal   pneumonia in the correct clinical context.     Mildly prominent fluid-filled small bowel loopswithout distinct   transition point. This may be related to enteritis.    Osseous lucent lesions and overall demineralization compatible patient's   known multiple myeloma.    < end of copied text >

## 2018-03-09 NOTE — H&P ADULT - NSHPLABSRESULTS_GEN_ALL_CORE
4.4    2.69  )-----------( 152      ( 08 Mar 2018 19:53 )             14.9   08 Mar 2018 19:53    137    |  94     |  111    ----------------------------<  98     5.4     |  18     |  8.2      Ca    10.6       08 Mar 2018 19:53    TPro  x      /  Alb  x      /  TBili  1.0    /  DBili  0.2    /  AST  x      /  ALT  x      /  AlkPhos  x      09 Mar 2018 02:34    Blood Gas Profile - Venous (03.09.18 @ 03:41)    pH, Venous: 7.27    pCO2, Venous: 39 mmHg    pO2, Venous: 22 mmHg    HCO3, Venous: 18 mmoL/L    Base Excess, Venous: -8.1 mmoL/L    Oxygen Saturation, Venous: 25 %  Blood Gas Venous - Potassium (03.09.18 @ 03:41)    Blood Gas Venous - Potassium: 6.1 mmoL/L  Blood Gas Venous - Lactate (03.09.18 @ 03:41)    Blood Gas Venous - Lactate: 5.4 mmoL/L  INR: 1.62  < from: Xray Chest 1 View AP/PA (03.09.18 @ 03:16) >    Increasing bilateral lung opacities    < end of copied text >    < from: CT Chest No Cont (03.09.18 @ 00:54) >    CT Chest:    The visualized inferior thyroid gland is unremarkable.    The trachea and central bronchi are patent.    Evaluation ofthe lung parenchyma demonstrates bilateral small pleural   effusions, right greater than left. There are multifocal airspace   opacities. The most prominent opacification is of the right lower lobe.   No pneumothorax.    No enlarged thoracic lymph nodes are seen.     The aorta and pulmonary arteries are within normal limits of size.    Heart size is normal.  No pericardial effusion is seen. There is   visualization of the interventricular septum which can be seen in an   anemic state.    < end of copied text >    < from: CT Chest No Cont (03.09.18 @ 00:54) >      CT Abdomen/Pelvis:    HEPATOBILIARY: Unremarkable unenhanced liver. .    SPLEEN: Unremarkable.    PANCREAS: Unremarkable.    ADRENAL GLANDS: Unremarkable.    KIDNEYS: No hydronephrosis. Right renal nonobstructing stones measuring   up to 9 mm.

## 2018-03-09 NOTE — H&P ADULT - ASSESSMENT
67 year male patient with history of MM diagnosed in 2011, was on trial medication in [facility] for 3years, then did not seek any medical care thereafter presented for SOB, cough , generalized weakness and decreased po intake since 2 weeks.    # sepsis secondary to community acquired pneumonia in immunosuppressed  patient   start patient on meropenem, vancomycin  will give one dose vancomycin for now, check trough after 24 hrs, adjust medication accordingly  check flu, RVP nasal MRSA  stop vancomycin if MRSA is negative  f/u panculture  continue with BiPAP for now  ABG after 1hr  repeat lactate at 11  continue with iv hydration for now    #bicytopenia (anemia/leucopenia) secondary to MM  anemia is multifactorial (CRF, MM)  check iron studies, folate , vitamin b12  consult hemato/onchology    #rich versus ckd  no need for urgent dialysis now  nephology consulted  monitor urine out put, check urine potein/creatinie ratio, urine electrolytes, check renal ultrasound  hyperkalemia: ekg no changes  will check BMP stat, treat accordingly  if refractory hyperkalemia will reach to nephrology      #DVT prophylaxis: heparin  no evidence of active bleeding 67 year male patient with history of MM diagnosed in 2011, was on trial medication in [facility] for 3years, then did not seek any medical care thereafter presented for SOB, cough , generalized weakness and decreased po intake since 2 weeks.    # sepsis secondary to community acquired pneumonia in immunosuppressed  patient   start patient on meropenem, vancomycin  will give one dose vancomycin for now, check trough after 24 hrs, adjust medication accordingly  check flu, RVP nasal MRSA/ speech and swallow evaluation  stop vancomycin if MRSA is negative  f/u panculture  continue with BiPAP for now  ABG after 1hr  repeat lactate at 11  continue with iv hydration for now    #bicytopenia (anemia/leucopenia) secondary to MM  anemia is multifactorial (CRF, MM)  check iron studies, folate , vitamin b12  consult hemato/onchology    #rich versus ckd  no need for urgent dialysis now  nephology consulted  monitor urine out put, check urine potein/creatinie ratio, urine electrolytes, check renal ultrasound  hyperkalemia: ekg no changes  will check BMP stat, treat accordingly  if refractory hyperkalemia will reach to nephrology      #DVT prophylaxis: heparin  no evidence of active bleeding 67 year male patient with history of MM diagnosed in 2011, was on trial medication in [facility] for 3years, then did not seek any medical care thereafter presented for SOB, cough , generalized weakness and decreased po intake since 2 weeks.    # sepsis secondary to community acquired pneumonia in immunosuppressed  patient   start patient on meropenem, vancomycin  will give one dose vancomycin for now, check trough after 24 hrs, adjust medication accordingly  check flu, RVP nasal MRSA/ speech and swallow evaluation  stop vancomycin if MRSA is negative  f/u panculture  continue with BiPAP for now  ABG after 1hr  repeat lactate at 11  continue with iv hydration for now    #bicytopenia (anemia/leucopenia) secondary to MM  anemia is multifactorial (CRF, MM)  check iron studies, folate , vitamin b12  patient received one unit of blood, had fever on the second unit  f/u transfusion reaction work up  once negative , resume transfusion  consult hemato/onchology    #rich versus ckd  no need for urgent dialysis now  nephology consulted  monitor urine out put, check urine potein/creatinie ratio, urine electrolytes, check renal ultrasound  hyperkalemia: ekg no changes  will check BMP stat, treat accordingly  if refractory hyperkalemia will reach to nephrology      #DVT prophylaxis: heparin  no evidence of active bleeding

## 2018-03-09 NOTE — PROCEDURE NOTE - NSPROCDETAILS_GEN_ALL_CORE
sterile technique, catheter placed/ultrasound guidance/guidewire recovered/lumen(s) aspirated and flushed/sterile dressing applied

## 2018-03-10 NOTE — PROGRESS NOTE ADULT - ASSESSMENT
68 y/o M PMH Multiple Myeloma ( was  being treated with holistics approach, Anemia who presented  with weakness/ SOB for past 2 weeks , worsened over the past 2 days, admitted with diagnosis of sepsis due to multifocal pneumonia/ acute hypoxemic respiratory failure requiring intubation.    #Septic Shock  2/2 PNA/ Acute hypoxic respiratory failure  -antibiotics as per team  -f/u pancx  -pressors as per team    #History of Multiple Myeloma now in Severe Renal failure/ Metabolic acidosis, the renal failure maybe multifactorial, sepsis and myeloma  -on 4 days of pulse Dexamethasone 40 MG IV dILY  - spep/upep; immunofixation serum ; free light chains; IgA for new baseline, will need Skeletal survey if his acute condition stabilizes pending  -will consider Velcade and or Cytoxan if stable and agrees   #Anemia/ multifactorial   Multiple myeloma/acute illness with bone marrow suppression  anemia in CKD  keep hb>7. transfusions  as needed   Ferritin 1559, sat 21, Folate 13.9    #lEvated pt/PTT possibly from DIC due to sepsis   -d-dimer was 1490, Fibrinogen 567, not bleeding  -if bleeding  and PT/PTT>1.5 UPN  can administer 10-15ML of FFP and if Fibrinogen is less than 100 can give Cryoprecipitate as well.  -monitor PT/PTT and Fibrinogen if bleeding  -may need a dose of Vitamin K oral  1-2.5mg to prevent K deficeny since on antibiotics    #Mild Hypercalcemia corrected calcium today is 11  -this is multifactorial and maybe due to Myeloma or  Renal failure  -it may respond to Steroids if from MM   and or HD  -no Bisphosphonates for now due to renal failure , we dont have Xgevan in house nor would I offer it at this point given his condition and mild hypercalcemia.  -its trending down and is getting fluids form medications    His prognosis is poor. Mortality is high given the acute situation.

## 2018-03-10 NOTE — PROGRESS NOTE ADULT - SUBJECTIVE AND OBJECTIVE BOX
OVERNIGHT EVENTS: still intubated ventilated/ s/p hd no events overnight, levophed 0.2 off propofol/ fentanyl, s/p 3 unit prbc    Vital Signs Last 24 Hrs  T(C): 36.2 (10 Mar 2018 02:00), Max: 36.9 (09 Mar 2018 11:52)  T(F): 97.2 (10 Mar 2018 02:00), Max: 98.5 (09 Mar 2018 11:52)  HR: 60 (10 Mar 2018 07:00) (58 - 108)  BP: 115/65 (10 Mar 2018 07:00) (74/46 - 134/72)  BP(mean): 77 (10 Mar 2018 06:00) (54 - 89)  RR: 25 (10 Mar 2018 07:00) (12 - 29)  SpO2: 100% (10 Mar 2018 07:00) (96% - 100%)    PHYSICAL EXAMINATION:    GENERAL: The patient is awake and alert in no apparent distress.     HEENT: Head is normocephalic and atraumatic. Extraocular muscles are intact. Mucous membranes are moist.    NECK: Supple.    LUNGS: Clear to auscultation without wheezing, rales or rhonchi; respirations unlabored    HEART: Regular rate and rhythm without murmur.    ABDOMEN: Soft, nontender, and nondistended.      EXTREMITIES: Without any cyanosis, clubbing, rash, lesions or edema.    NEUROLOGIC: Grossly intact.    SKIN: No ulceration or induration present.      LABS:                        6.2    16.63 )-----------( 128      ( 10 Mar 2018 06:18 )             20.1     03-10    138  |  97<L>  |  103<HH>  ----------------------------<  131<H>  6.0<HH>   |  24  |  7.5<HH>    Ca    9.1      10 Mar 2018 06:18  Mg     2.7     03-10    TPro  8.2<H>  /  Alb  1.6<L>  /  TBili  1.9<H>  /  DBili  x   /  AST  48<H>  /  ALT  31  /  AlkPhos  44  03-10    PT/INR - ( 09 Mar 2018 11:03 )   PT: 16.80 sec;   INR: 1.54 ratio         PTT - ( 09 Mar 2018 11:03 )  PTT:55.6 sec  Urinalysis Basic - ( 09 Mar 2018 02:34 )    Color: Dark Yellow / Appearance: Turbid / S.020 / pH: x  Gluc: x / Ketone: Negative  / Bili: Negative / Urobili: 0.2 mg/dL   Blood: x / Protein: 100 mg/dL / Nitrite: Negative   Leuk Esterase: Negative / RBC: 10-25 /HPF / WBC x   Sq Epi: x / Non Sq Epi: Moderate /HPF / Bacteria: Few /HPF      ABG - ( 10 Mar 2018 07:19 )  pH: 7.22  /  pCO2: 55    /  pO2: 144   / HCO3: 23    / Base Excess: -5.2  /  SaO2: 99        vent 420/ 26/50/12        CARDIAC MARKERS ( 09 Mar 2018 11:03 )  0.58 ng/mL / x     / 280 U/L / x     / 3.4 ng/mL      D-Dimer Assay, Quantitative: 1490 ng/mL DDU (18 @ 17:13)      Lactate, Blood: 4.0 mmol/L (18 @ 11:03)        MICROBIOLOGY:      MEDICATIONS  (STANDING):  dexamethasone  IVPB 40 milliGRAM(s) IV Intermittent daily  fentaNYL   Infusion 2 MICROgram(s)/kG/Hr (14.98 mL/Hr) IV Continuous <Continuous>  heparin  Injectable 5000 Unit(s) SubCutaneous every 8 hours  influenza   Vaccine 0.5 milliLiter(s) IntraMuscular once  meropenem  IVPB 1000 milliGRAM(s) IV Intermittent every 24 hours  norepinephrine Infusion 0.5 MICROgram(s)/kG/Min (35.109 mL/Hr) IV Continuous <Continuous>  pantoprazole  Injectable 40 milliGRAM(s) IV Push daily  propofol Infusion 5 MICROgram(s)/kG/Min (2.247 mL/Hr) IV Continuous <Continuous>    MEDICATIONS  (PRN):      RADIOLOGY & ADDITIONAL STUDIES:

## 2018-03-10 NOTE — PROGRESS NOTE ADULT - SUBJECTIVE AND OBJECTIVE BOX
S/P Regina 3/9    PAST MEDICAL & SURGICAL HISTORY:  Anemia, unspecified type  Multiple myeloma, remission status unspecified    Vital Signs Last 24 Hrs  T(C): 36.3 (10 Mar 2018 00:00), Max: 38.6 (09 Mar 2018 04:52)  T(F): 97.3 (10 Mar 2018 00:00), Max: 101.4 (09 Mar 2018 04:52)  HR: 69 (10 Mar 2018 00:00) (64 - 108)  BP: 132/70 (10 Mar 2018 00:00) (74/46 - 141/69)  BP(mean): 69 (09 Mar 2018 17:52) (54 - 95)  RR: 29 (10 Mar 2018 00:00) (12 - 41)  SpO2: 100% (10 Mar 2018 00:00) (96% - 100%)    Mode: AC/ CMV (Assist Control/ Continuous Mandatory Ventilation)  RR (machine): 26  TV (machine): 420  FiO2: 50  PEEP: 12  ITime: 1  MAP: 20  PIP: 30    I&O's Detail    08 Mar 2018 07:01  -  09 Mar 2018 07:00  --------------------------------------------------------  IN:    Packed Red Blood Cells: 279 mL    sodium chloride 0.9%: 150 mL  Total IN: 429 mL    OUT:    Indwelling Catheter - Urethral: 60 mL  Total OUT: 60 mL    Total NET: 369 mL      09 Mar 2018 07:01  -  10 Mar 2018 04:33  --------------------------------------------------------  IN:    dextrose 5%: 800 mL    fentaNYL  Infusion: 108.7 mL    IV PiggyBack: 450 mL    norepinephrine Infusion: 105 mL    norepinephrine Infusion: 63 mL    Packed Red Blood Cells: 529 mL    propofol Infusion: 206.5 mL    sodium chloride 0.9%: 900 mL  Total IN: 3162.2 mL    OUT:    Indwelling Catheter - Urethral: 188 mL  Total OUT: 188 mL    Total NET: 2974.2 mL    Urinalysis Basic - ( 09 Mar 2018 02:34 )    Color: Dark Yellow / Appearance: Turbid / S.020 / pH: x  Gluc: x / Ketone: Negative  / Bili: Negative / Urobili: 0.2 mg/dL   Blood: x / Protein: 100 mg/dL / Nitrite: Negative   Leuk Esterase: Negative / RBC: 10-25 /HPF / WBC x   Sq Epi: x / Non Sq Epi: Moderate /HPF / Bacteria: Few /HPF               6.4    12.14 )-----------( 146      (  @ 20:31 )             20.6                4.6    7.08  )-----------( 138      (  @ 11:03 )             15.3                4.4    2.69  )-----------( 152      (  @ 19:53 )             14.9                  134   |  96    |  124                Ca: 9.9    BMP:   ----------------------------< 104    M.9   (18 @ 17:13)             6.4    |  19    | 8.9                Ph: x        LFT:     TPro: 8.8 / Alb: 1.8 / TBili: 1.1 / DBili: x / AST: 52 / ALT: 31 / AlkPhos: 35   (18 @ 17:13)      PT/INR - ( 09 Mar 2018 11:03 )   PT: 16.80 sec;   INR: 1.54 ratio         PTT - ( 09 Mar 2018 11:03 )  PTT:55.6 sec    CARDIAC MARKERS ( 09 Mar 2018 11:03 )  0.58 ng/mL / x     / 280 U/L / x     / 3.4 ng/mL    ABG - ( 09 Mar 2018 23:57 )  pH: 7.24  /  pCO2: 50    /  pO2: 136   / HCO3: 22    / Base Excess: -5.4  /  SaO2: 99        MEDICATIONS  (STANDING):  dexamethasone  IVPB 40 milliGRAM(s) IV Intermittent daily  fentaNYL   Infusion 2 MICROgram(s)/kG/Hr (14.98 mL/Hr) IV Continuous <Continuous>  heparin  Injectable 5000 Unit(s) SubCutaneous every 8 hours  influenza   Vaccine 0.5 milliLiter(s) IntraMuscular once  meropenem  IVPB 1000 milliGRAM(s) IV Intermittent every 24 hours  norepinephrine Infusion 0.5 MICROgram(s)/kG/Min (35.109 mL/Hr) IV Continuous <Continuous>  pantoprazole  Injectable 40 milliGRAM(s) IV Push daily  propofol Infusion 5 MICROgram(s)/kG/Min (2.247 mL/Hr) IV Continuous <Continuous>    Diet, NPO (18 @ 05:37)    PHYSICAL EXAM:  General Appearance: NAD  Neck: Supple  Chest: Equal expansion bilaterally, equal breath sounds  CV: S1, S2, RRR  Abdomen: Soft, ND  Extremities: Grossly symmetric, WNL  Incision: Seward in place. Dressing intact, dry.

## 2018-03-10 NOTE — PROGRESS NOTE ADULT - SUBJECTIVE AND OBJECTIVE BOX
seen and examined  intubated/ventilated  on levophed  anuric        Standing Inpatient Medications  dexamethasone  IVPB 40 milliGRAM(s) IV Intermittent daily  fentaNYL   Infusion 2 MICROgram(s)/kG/Hr IV Continuous <Continuous>  heparin  Injectable 5000 Unit(s) SubCutaneous every 8 hours  influenza   Vaccine 0.5 milliLiter(s) IntraMuscular once  meropenem  IVPB 1000 milliGRAM(s) IV Intermittent every 24 hours  norepinephrine Infusion 0.5 MICROgram(s)/kG/Min IV Continuous <Continuous>  pantoprazole  Injectable 40 milliGRAM(s) IV Push daily  propofol Infusion 5 MICROgram(s)/kG/Min IV Continuous <Continuous>    PRN Inpatient Medications      VITALS/PHYSICAL EXAM  --------------------------------------------------------------------------------  T(C): 36.3 (03-10-18 @ 00:00), Max: 37.3 (03-09-18 @ 07:22)  HR: 69 (03-10-18 @ 00:00) (64 - 108)  BP: 132/70 (03-10-18 @ 00:00) (74/46 - 134/72)  RR: 29 (03-10-18 @ 00:00) (12 - 41)  SpO2: 100% (03-10-18 @ 00:00) (96% - 100%)        03-08-18 @ 07:01  -  03-09-18 @ 07:00  --------------------------------------------------------  IN: 429 mL / OUT: 60 mL / NET: 369 mL    03-09-18 @ 07:01  -  03-10-18 @ 06:09  --------------------------------------------------------  IN: 3162.2 mL / OUT: 188 mL / NET: 2974.2 mL      Physical Exam:  	Gen: intubated/ventilated  	HEENT: OG tube   	Pulm:  B/L nroy   	CV:  S1S2; no rub  	Abd: +distended  	LE:  edema  	Vascular access: femoral udall     LABS/STUDIES  --------------------------------------------------------------------------------              6.4    12.14 >-----------<  146      [03-09-18 @ 20:31]              20.6     134  |  96  |  124  ----------------------------<  104      [03-09-18 @ 17:13]  6.4   |  19  |  8.9        Ca     9.9     [03-09-18 @ 17:13]      Mg     2.9     [03-09-18 @ 17:13]    TPro  8.8  /  Alb  1.8  /  TBili  1.1  /  DBili  x   /  AST  52  /  ALT  31  /  AlkPhos  35  [03-09-18 @ 17:13]    PT/INR: PT 16.80, INR 1.54       [03-09-18 @ 11:03]  PTT: 55.6       [03-09-18 @ 11:03]    Troponin 0.58      [03-09-18 @ 11:03]        [03-09-18 @ 11:03]        [03-09-18 @ 02:34]    Creatinine Trend:  SCr 8.9 [03-09 @ 17:13]  SCr 8.5 [03-09 @ 11:03]  SCr 7.6 [03-09 @ 05:49]  SCr 8.2 [03-08 @ 19:53]          Iron 28, TIBC 135, %sat 21      [03-09-18 @ 11:03]

## 2018-03-10 NOTE — PROGRESS NOTE ADULT - ASSESSMENT
[place]/MM/sepsis/respiratory failure/mixed metabolic and respiratory acidosis/anemia/hyperkalemia /hypercalcemia   # ATN in nature   # no hydro on CT   # anuric   # on levophed  # corrected calcium : around 11.6, check  i ca, ?secondary to MM  # s/p HD yesterday first run  # hd today, standard bath, femoral udall, UF 1 liter as tolerated  # decrease meropenem to 500 q 24  # ? transfuse 1 unit of PRBC  # check repeat labs today [place]/MM/sepsis/respiratory failure/mixed metabolic and respiratory acidosis/anemia/hyperkalemia /hypercalcemia   # ATN in nature   # no hydro on CT   # anuric   # on levophed  # corrected calcium : around 11.6, check  i ca, ?secondary to MM seen by hematology , started on dexamethasone   # s/p HD yesterday first run  # hd today, standard bath, femoral udall, UF 1 liter as tolerated  # decrease meropenem to 500 q 24  # ? transfuse 1 unit of PRBC  # check repeat labs today   # will follow

## 2018-03-10 NOTE — PROGRESS NOTE ADULT - ASSESSMENT
AHRF/ SEVERE PNA/ ADVANCED MM RENAL FAILURE S/P HD, SEVERE ANEMIA NO ACTIVE BLEED    - SEDATION VACATION  - DECREASE FIO2, DEC PEEP DEEP TA  - CV 2 D ECHO  - GI FEEDING  - ID VANCO/ MARIN/ LEVAQUIN  - DVT PROPHYLAXIS  - HD  -POOR PROGNOSIS

## 2018-03-10 NOTE — PROGRESS NOTE ADULT - SUBJECTIVE AND OBJECTIVE BOX
He is now on levophed. He had Dialysis yesterday. He was started on Dexamethasone.       Vital Signs Last 24 Hrs  T(C): 36.1 (10 Mar 2018 08:00), Max: 36.6 (09 Mar 2018 15:52)  T(F): 97 (10 Mar 2018 08:00), Max: 97.9 (09 Mar 2018 15:52)  HR: 58 (10 Mar 2018 10:00) (56 - 84)  BP: 113/62 (10 Mar 2018 10:00) (74/46 - 134/72)  BP(mean): 81 (10 Mar 2018 10:00) (54 - 89)  RR: 32 (10 Mar 2018 10:00) (12 - 32)  SpO2: 100% (10 Mar 2018 10:00) (100% - 100%)    Allergies:No Known Allergies  Intolerances  MEDICATIONS  (STANDING):  dexamethasone  IVPB 40 milliGRAM(s) IV Intermittent daily  fentaNYL   Infusion 2 MICROgram(s)/kG/Hr (14.98 mL/Hr) IV Continuous <Continuous>  heparin  Injectable 5000 Unit(s) SubCutaneous every 8 hours  influenza   Vaccine 0.5 milliLiter(s) IntraMuscular once  levoFLOXacin IVPB 250 milliGRAM(s) IV Intermittent every 48 hours  meropenem  IVPB 500 milliGRAM(s) IV Intermittent every 24 hours  norepinephrine Infusion 0.5 MICROgram(s)/kG/Min (35.109 mL/Hr) IV Continuous <Continuous>  pantoprazole  Injectable 40 milliGRAM(s) IV Push daily  propofol Infusion 5 MICROgram(s)/kG/Min (2.247 mL/Hr) IV Continuous <Continuous>  vancomycin  IVPB 1000 milliGRAM(s) IV Intermittent every 24 hours    MEDICATIONS  (PRN):      CBC Full  -  ( 10 Mar 2018 06:18 )  WBC Count : 16.63 K/uL  Hemoglobin : 6.2 g/dL  Hematocrit : 20.1 %  Platelet Count - Automated : 128 K/uL  Mean Cell Volume : 92.6 fL  Mean Cell Hemoglobin : 28.6 pg  Mean Cell Hemoglobin Concentration : 30.8 g/dL  Auto Neutrophil # : 14.56 K/uL  Auto Lymphocyte # : 0.70 K/uL  Auto Monocyte # : 0.26 K/uL  Auto Eosinophil # : 0.00 K/uL  Auto Basophil # : 0.10 K/uL  Auto Neutrophil % : 87.5 %  Auto Lymphocyte % : 4.2 %  Auto Monocyte % : 1.6 %  Auto Eosinophil % : 0.0 %  Auto Basophil % : 0.6 %  PT/INR - ( 09 Mar 2018 11:03 )   PT: 16.80 sec;   INR: 1.54 ratio         PTT - ( 09 Mar 2018 11:03 )  PTT:55.6 [id number] @ 17:13  1490 ng/mL DDU<H> [0 - 230]               6.2    16.63 )-----------( 128      ( 03-10 @ 06:18 )             20.1                6.4    12.14 )-----------( 146      ( 03-09 @ 20:31 )             20.6                4.6    7.08  )-----------( 138      ( 03-09 @ 11:03 )             15.3                4.4    2.69  )-----------( 152      ( 03-08 @ 19:53 )             14.9       03-10    138  |  97<L>  |  103<HH>  ----------------------------<  131<H>  6.0<HH>   |  24  |  7.5<HH>    Ca    9.1      10 Mar 2018 06:18  Mg     2.7     03-10    TPro  8.2<H>  /  Alb  1.6<L>  /  TBili  1.9<H>  /  DBili  x   /  AST  48<H>  /  ALT  31  /  AlkPhos  44  03-10  LIVER FUNCTIONS - ( 10 Mar 2018 06:18 )  Alb: 1.6 g/dL / Pro: 8.2 g/dL / ALK PHOS: 44 U/L / ALT: 31 U/L / AST: 48 U/L / GGT: x               Radiology:  < from: Xray Chest 1 View- PORTABLE-Routine (03.10.18 @ 06:57) >  ardiomegaly and bilateral opacities, stable. Support devices in place.        < end of copied text >      Path:    REVIEW OF SYSTEMS:  Unable to obtain he is intubated.    PHYSICAL EXAM:  GENERAL: Sedated, ET, OT and right TLC in place  HEAD:  Atraumatic, Normocephalic  EYES:  conjunctiva and sclera clear  CHEST/LUNG No wheeze, transmitted ET tube sounds and rhonchi in bases   HEART: Regular rate and rhythm; No murmurs, rubs, or gallops  ABDOMEN: Soft, Nontender, Nondistended; Bowel sounds present  EXTREMITIES:, No clubbing, cyanosis, or edema  PSYCH: AAOx3  NEUROLOGY: Sedated  SKIN: No rashes     ADDITIONAL STUDIES:      Skeletal survey (2011) Impression: Two lucencies within the skull, may represent a venous lake or benign process, correlate clinically with laboratory values ? No other lytic lesions identified ? C5-6 C6-7 degenerative disk disease cervical spine ? Mild bilateral knee degenerative changes     Textual Result SEE TEXT (2011)   RESULTS FOR PATIENT - #[id number]  ARLEY COKER  [GENO/LA LT CHAIN]  [id number] - [id number]  IRASEMA: 07/27/11 1733  RECV: 07/27/11 1927       FREE KAPPA SERU                24.3   H    mg/L          (3.3-19.4)       FREE LAMBDA SER                 2.0   L    mg/L          (5.7-26.3)       FREE K/L RATIO                12.15   H                 (0.26-1.65)    Result Name Results Units Reference Range Textual Result SEE TEXT   [id number] - [id number]  IRASEMA: 07/27/11 1733  RECV: 07/27/11 1927       IgA                            2944   H    mg/dL           ()    IMMUNOFIX SERUM (()) IgA-Kappa monoclonal protein present. Test Performed at: [facility] [place] [address] unless otherwise noted.      Bone marrow biopsy: 8/2011: Palmacytosis with increased kappa light chains.

## 2018-03-11 NOTE — PROGRESS NOTE ADULT - SUBJECTIVE AND OBJECTIVE BOX
He is the same. Still on Low dose levophed. No HD today. No bleeding. PEEP did increase to 10 but Sao2 is the same. Anuric. He is getting a PRBC transfusion       Vital Signs Last 24 Hrs  T(C): 36.9 (11 Mar 2018 12:00), Max: 37 (11 Mar 2018 01:14)  T(F): 98.4 (11 Mar 2018 12:00), Max: 98.6 (11 Mar 2018 01:14)  HR: 60 (11 Mar 2018 14:00) (50 - 98)  BP: 131/74 (11 Mar 2018 14:00) (58/37 - 181/101)  BP(mean): 91 (11 Mar 2018 14:00) (42 - 132)  RR: 32 (11 Mar 2018 14:00) (28 - 80)  SpO2: 100% (11 Mar 2018 14:00) (90% - 100%)    Allergies:No Known Allergies  Intolerances  MEDICATIONS  (STANDING):  chlorhexidine 0.12% Liquid 15 milliLiter(s) Swish and Spit two times a day  dexamethasone  IVPB 40 milliGRAM(s) IV Intermittent daily  fentaNYL   Infusion 2 MICROgram(s)/kG/Hr (14.98 mL/Hr) IV Continuous <Continuous>  heparin  Injectable 5000 Unit(s) SubCutaneous every 8 hours  influenza   Vaccine 0.5 milliLiter(s) IntraMuscular once  levoFLOXacin IVPB 250 milliGRAM(s) IV Intermittent every 48 hours  meropenem  IVPB 500 milliGRAM(s) IV Intermittent every 24 hours  norepinephrine Infusion 0.5 MICROgram(s)/kG/Min (35.109 mL/Hr) IV Continuous <Continuous>  pantoprazole  Injectable 40 milliGRAM(s) IV Push daily  propofol Infusion 5 MICROgram(s)/kG/Min (2.247 mL/Hr) IV Continuous <Continuous>  vancomycin  IVPB 1000 milliGRAM(s) IV Intermittent every 24 hours    MEDICATIONS  (PRN):      CBC Full  -  ( 11 Mar 2018 05:16 )  WBC Count : 23.00 K/uL  Hemoglobin : 6.9 g/dL  Hematocrit : 21.4 %  Platelet Count - Automated : 132 K/uL  Mean Cell Volume : 89.9 fL  Mean Cell Hemoglobin : 29.0 pg  Mean Cell Hemoglobin Concentration : 32.2 g/dL  Auto Neutrophil # : 21.38 K/uL  Auto Lymphocyte # : 0.57 K/uL  Auto Monocyte # : 0.14 K/uL  Auto Eosinophil # : 0.00 K/uL  Auto Basophil # : 0.05 K/uL  Auto Neutrophil % : 93.0 %  Auto Lymphocyte % : 2.5 %  Auto Monocyte % : 0.6 %  Auto Eosinophil % : 0.0 %  Auto Basophil % : 0.2 %               6.9    23.00 )-----------( 132      ( 03-11 @ 05:16 )             21.4                7.3    20.50 )-----------( 136      ( 03-10 @ 20:02 )             22.9                6.2    16.63 )-----------( 128      ( 03-10 @ 06:18 )             20.1                6.4    12.14 )-----------( 146      ( 03-09 @ 20:31 )             20.6                4.6    7.08  )-----------( 138      ( 03-09 @ 11:03 )             15.3                4.4    2.69  )-----------( 152      ( 03-08 @ 19:53 )             14.9       03-11    138  |  98  |  79<HH>  ----------------------------<  147<H>  4.7   |  22  |  5.6<HH>    Ca    8.8      11 Mar 2018 05:16  Mg     2.4     03-11    TPro  8.2<H>  /  Alb  1.5<L>  /  TBili  2.1<H>  /  DBili  x   /  AST  44<H>  /  ALT  31  /  AlkPhos  68  03-11  LIVER FUNCTIONS - ( 11 Mar 2018 05:16 )  Alb: 1.5 g/dL / Pro: 8.2 g/dL / ALK PHOS: 68 U/L / ALT: 31 U/L / AST: 44 U/L / GGT: x               Culture - Blood (collected 09 Mar 2018 10:21)  Source: .Blood Blood  Preliminary Report (10 Mar 2018 16:00):    No growth to date.        REVIEW OF SYSTEMS:  See above. He is intubated, the ROS was form nurse    HYSICAL EXAM:  GENERAL: Sedated, ET, OT and right TLC in place  HEAD:  Atraumatic, Normocephalic  EYES:  conjunctiva and sclera clear  CHEST/LUNG No wheeze, transmitted ET tube sounds and rhonchi in bases   HEART: Regular rate and rhythm; No murmurs, rubs, or gallops  ABDOMEN: Soft, Nontender, Nondistended; Bowel sounds present  EXTREMITIES:, No clubbing, cyanosis, or edema  NEUROLOGY: Sedated  SKIN: No rashes         ADDITIONAL STUDIES:      Skeletal survey (2011) Impression: Two lucencies within the skull, may represent a venous lake or benign process, correlate clinically with laboratory values ? No other lytic lesions identified ? C5-6 C6-7 degenerative disk disease cervical spine ? Mild bilateral knee degenerative changes     Textual Result SEE TEXT (2011)   RESULTS FOR PATIENT - #[id number]  ARLEY COKER  [GENO/LA LT CHAIN]  [id number] - [id number]  IRASEMA: 07/27/11 1733  RECV: 07/27/11 1927       FREE KAPPA SERU                24.3   H    mg/L          (3.3-19.4)       FREE LAMBDA SER                 2.0   L    mg/L          (5.7-26.3)       FREE K/L RATIO                12.15   H                 (0.26-1.65)    Result Name Results Units Reference Range Textual Result SEE TEXT   [id number] - [id number]  IRASEMA: 07/27/11 1733  RECV: 07/27/11 1927       IgA                            2944   H    mg/dL           ()    IMMUNOFIX SERUM (()) IgA-Kappa monoclonal protein present. Test Performed at: [place] [address] unless otherwise noted.      Bone marrow biopsy: 8/2011: Palmacytosis with increased kappa light chains.

## 2018-03-11 NOTE — DIETITIAN INITIAL EVALUATION ADULT. - OTHER INFO
P/w weakness/SOB for past 2 weeks, worsened over the past 2 days, admitted with diagnosis of sepsis due to multifocal pneumonia/ acute hypoxemic respiratory failure requiring intubation (3/9). Hem/onc on board - recurrent multiple myeloma. Nephrology following- MADISON (anuric), s/p HD yesterday and 2 days ago. Reason for RD assessment: intubation x 48hrs

## 2018-03-11 NOTE — PROGRESS NOTE ADULT - SUBJECTIVE AND OBJECTIVE BOX
DIAGNOSIS:   HOSPITAL DAY #:    STATUS POST:    POST OPERATIVE DAY #:     Vital Signs Last 24 Hrs  T(C): 37 (11 Mar 2018 20:00), Max: 37 (11 Mar 2018 01:14)  T(F): 98.6 (11 Mar 2018 20:00), Max: 98.6 (11 Mar 2018 01:14)  HR: 58 (11 Mar 2018 20:00) (54 - 98)  BP: 115/64 (11 Mar 2018 20:00) (86/56 - 181/101)  BP(mean): 78 (11 Mar 2018 20:00) (66 - 132)  RR: 31 (11 Mar 2018 20:00) (26 - 80)  SpO2: 99% (11 Mar 2018 20:00) (90% - 100%)    SUBJECTIVE: Pt seen    Pain: YES  [ ]   NO [ ]   Nausea: [ ] YES [ ] NO           Vomiting: [ ] YES [ ] NO  Flatus: [ ] YES [ ] NO             Bowel Movement: [ ] YES [ ] NO     Void: [ ]YES [ ]No      MARC DRAINAGE: SIGNIFICANT [ ]   NOT SIGNIFICANT [ ]   NOT APPLICABLE [ ]  YES [ ] NO    General Appearance: Appears well, NAD  Neck: Supple  Chest: Equal expansion bilaterally, equal breath sounds  CV: Pulse regular presently  Abdomen: Soft [x ] YES [ ]NO  DISTENDED [ ] YES [x ] NO TENDERNESS [ ]YES [ ]NO  INCISIONS: HEALING WELL [ ] YES  [ ] NO ERYTHEMA [ ] YES [ ] NO DRAINAGE [ ] YES  [ ] NO  Extremities: Grossly symmetric, CALF TENDERNESS [ ] YES  [ ] NO  no groin hematoma    LABS:                        6.9    23.00 )-----------( 132      ( 11 Mar 2018 05:16 )             21.4     03-11    138  |  98  |  79<HH>  ----------------------------<  147<H>  4.7   |  22  |  5.6<HH>    Ca    8.8      11 Mar 2018 05:16  Mg     2.4     03-11    TPro  8.2<H>  /  Alb  1.5<L>  /  TBili  2.1<H>  /  DBili  x   /  AST  44<H>  /  ALT  31  /  AlkPhos  68  03-11            ASSESSMENT:     GOOD POST OP COURSE [ ]  YES  [ ] NO  CONDITION IMPROVING  []  YES  [ ]  NO          PLAN:    CONTINUE PRESENT MANAGEMENT  [x ] YES  [ ] NO

## 2018-03-11 NOTE — PROGRESS NOTE ADULT - ASSESSMENT
66 y/o M PMH Multiple Myeloma ( was  being treated with holistics approach, Anemia who presented  with weakness/ SOB for past 2 weeks , worsened over the past 2 days, admitted with diagnosis of sepsis due to multifocal pneumonia/ acute hypoxemic respiratory failure requiring intubation.    #Septic Shock  2/2 PNA/ Acute hypoxic respiratory failure  -essentially unchanged  -antibiotics as per team  -f/u pancx  -pressors as per team    #History of Multiple Myeloma now in Severe Renal failure/ Metabolic acidosis, the renal failure maybe multifactorial, sepsis and myeloma  -on 4 days of pulse Dexamethasone 40 MG IV dILY day3 of 4 today  - spep/upep; immunofixation serum ; free light chains; IgA for new baseline are pending, will need Skeletal survey if his acute condition stabilizes pending  -will consider Velcade and or Cytoxan if stable and agrees since he did not want conventional treatment in past.    #Anemia/ multifactorial   Multiple myeloma/acute illness with bone marrow suppression  anemia in CKD  keep hb>7. transfusions  as needed   Ferritin 1559, sat 21, Folate 13.9    #lEvated pt/PTT possibly from DIC due to sepsis   -d-dimer was 1490, Fibrinogen 567, not bleeding  -if bleeding  and PT/PTT>1.5 UPN  can administer 10-15ML of FFP and if Fibrinogen is less than 100 can give Cryoprecipitate as well.  -monitor PT/PTT and Fibrinogen if bleeding  -may need a dose of Vitamin K oral  1-2.5mg to prevent K deficeny since on antibiotics    #Mild Hypercalcemia corrected calcium today is 10.4   -this is multifactorial and maybe due to Myeloma or  Renal failure and is decreasing  -it may respond to Steroids if from MM   and or HD  -no Bisphosphonates for now due to renal failure , we dont have Xgevan in house nor would I offer it at this point given his condition and mild hypercalcemia.  -its trending down and is getting fluids form medications    #Elevated Bili and AST  -maybe due to organ dysfunction, liver from sepsis and or mild hemolysis from transfused PRBC     His prognosis is poor. Mortality is high given the acute situation. 66 y/o M PMH Multiple Myeloma ( was  being treated with holistics approach, Anemia who presented  with weakness/ SOB for past 2 weeks , worsened over the past 2 days, admitted with diagnosis of sepsis due to multifocal pneumonia/ acute hypoxemic respiratory failure requiring intubation.    #Septic Shock  2/2 PNA/ Acute hypoxic respiratory failure  -essentially unchanged  -antibiotics as per team  -f/u pancx  -pressors as per team    #History of Multiple Myeloma now in Severe Renal failure/ Metabolic acidosis, the renal failure maybe multifactorial, sepsis and myeloma  -on 4 days of pulse Dexamethasone 40 MG IV dILY day3 of 4 today  - spep/upep; immunofixation serum ; free light chains; IgA for new baseline are pending, will need Skeletal survey if his acute condition stabilizes pending  -will consider Velcade and or Cytoxan if stable and agrees since he did not want conventional treatment in past.    #Anemia/ multifactorial   Multiple myeloma/acute illness with bone marrow suppression  anemia in CKD  keep hb>7. transfusions  as needed   Ferritin 1559, sat 21, Folate 13.9    #lEvated pt/PTT possibly from DIC due to sepsis   -d-dimer was 1490, Fibrinogen 567, not bleeding  -if bleeding  and PT/PTT>1.5 UPN  can administer 10-15ML of FFP and if Fibrinogen is less than 100 can give Cryoprecipitate as well.  -monitor PT/PTT and Fibrinogen if bleeding  -may need a dose of Vitamin K oral  1-2.5mg to prevent K deficeny since on antibiotics    #Mild Hypercalcemia corrected calcium today is 10.4   -this is multifactorial and maybe due to Myeloma or  Renal failure and is decreasing  -it may respond to Steroids if from MM   and or HD  -no Bisphosphonates for now due to renal failure , we dont have Xgevan in house nor would I offer it at this point given his condition and mild hypercalcemia.  -its trending down and is getting fluids form medications    #Elevated Bili and AST  -maybe due to organ dysfunction, liver from sepsis and or mild hemolysis from transfused PRBC     I spoke with his sisters and had an extensive discussion at bedside. I explained that given the current clinical situation his mortality is high. They asked for a number and I explained that his inpatient mortality is probably >50%. We spoke about myeloma in treatment options and we spoke about prognosis for the average patient who is not in respiratory failure/shock and renal failure.

## 2018-03-11 NOTE — PROGRESS NOTE ADULT - SUBJECTIVE AND OBJECTIVE BOX
OVERNIGHT EVENTS: still intubated, ventilated    Vital Signs Last 24 Hrs  T(C): 36.9 (11 Mar 2018 08:00), Max: 37 (11 Mar 2018 01:14)  T(F): 98.4 (11 Mar 2018 08:00), Max: 98.6 (11 Mar 2018 01:14)  HR: 60 (11 Mar 2018 08:00) (50 - 98)  BP: 138/73 (11 Mar 2018 08:00) (58/37 - 181/101)  BP(mean): 92 (11 Mar 2018 08:00) (42 - 132)  RR: 31 (11 Mar 2018 08:00) (28 - 80)  SpO2: 98% (11 Mar 2018 08:00) (90% - 100%)    PHYSICAL EXAMINATION:    GENERAL: sedated    HEENT: Head is normocephalic and atraumatic. Extraocular muscles are intact. Mucous membranes are moist.    NECK: Supple.    LUNGS: b/l rhonchi    HEART: Regular rate and rhythm without murmur.    ABDOMEN: Soft, nontender, and nondistended.      EXTREMITIES: Without any cyanosis, clubbing, rash, lesions or edema.    NEUROLOGIC: Grossly intact.    SKIN: No ulceration or induration present.      LABS:                        6.9    23.00 )-----------( 132      ( 11 Mar 2018 05:16 )             21.4     03-11    138  |  98  |  79<HH>  ----------------------------<  147<H>  4.7   |  22  |  5.6<HH>    Ca    8.8      11 Mar 2018 05:16  Mg     2.4     03-11    TPro  8.2<H>  /  Alb  1.5<L>  /  TBili  2.1<H>  /  DBili  x   /  AST  44<H>  /  ALT  31  /  AlkPhos  68  03-11    PT/INR - ( 09 Mar 2018 11:03 )   PT: 16.80 sec;   INR: 1.54 ratio         PTT - ( 09 Mar 2018 11:03 )  PTT:55.6 sec    ABG - ( 10 Mar 2018 15:03 )  pH: 7.28  /  pCO2: 52    /  pO2: 84    / HCO3: 24    / Base Excess: -2.9  /  SaO2: 95                CARDIAC MARKERS ( 09 Mar 2018 11:03 )  0.58 ng/mL / x     / 280 U/L / x     / 3.4 ng/mL                MICROBIOLOGY:  Culture Results:   No growth to date. (03-09 @ 10:21)      MEDICATIONS  (STANDING):  chlorhexidine 0.12% Liquid 15 milliLiter(s) Swish and Spit two times a day  dexamethasone  IVPB 40 milliGRAM(s) IV Intermittent daily  fentaNYL   Infusion 2 MICROgram(s)/kG/Hr (14.98 mL/Hr) IV Continuous <Continuous>  heparin  Injectable 5000 Unit(s) SubCutaneous every 8 hours  influenza   Vaccine 0.5 milliLiter(s) IntraMuscular once  levoFLOXacin IVPB 250 milliGRAM(s) IV Intermittent every 48 hours  meropenem  IVPB 500 milliGRAM(s) IV Intermittent every 24 hours  norepinephrine Infusion 0.5 MICROgram(s)/kG/Min (35.109 mL/Hr) IV Continuous <Continuous>  pantoprazole  Injectable 40 milliGRAM(s) IV Push daily  propofol Infusion 5 MICROgram(s)/kG/Min (2.247 mL/Hr) IV Continuous <Continuous>  vancomycin  IVPB 1000 milliGRAM(s) IV Intermittent every 24 hours    MEDICATIONS  (PRN):      RADIOLOGY & ADDITIONAL STUDIES:

## 2018-03-11 NOTE — PROGRESS NOTE ADULT - ASSESSMENT
AHRF/ SEVERE PNA/ ADVANCED MM RENAL FAILURE S/P HD, SEVERE ANEMIA NO ACTIVE BLEED, s/p 5 unit of prbc    - SEDATION VACATION  - DECREASE FIO2, DEC PEEP DEEP TA  - CV 2 D ECHO  - GI FEEDING  - ID VANCO/ MARIN/ LEVAQUIN, ID CONSULT  - DVT PROPHYLAXIS  - HD  -POOR PROGNOSIS  SPOKE AT LENGTH WITH FAMILY

## 2018-03-11 NOTE — PROGRESS NOTE ADULT - SUBJECTIVE AND OBJECTIVE BOX
Patient is a 67y old  Male who presents with a chief complaint of sob, weakness (09 Mar 2018 05:52)    Hypotensive ON. Gave 250 ns bolus and increased levophed/ dec propofol and inc fentanyl. Stabilized    HPI:  66 y/o M PMH Multiple Myeloma, Anemia who presents with weakness, SOB.  Patient had a dry cough and felt like he was sick for the past 2 weeks and this worsened over the past 2 days.  Patient is unable to walk without being extremely dyspneic , dyspnea gradually progressed to be at rest. Patient had chills in the last 2 days Patient is weak and not eating and not drinking well. Patient also has bone pain in his right flank.  Patient has not seen a doctor in numerous years and is not on medication.  Family felt he looked pale and weak. Patient denies any hematuria, melena or hematochezia. Patient is on iron tablets since 5 years, since then his stool is dark, with no recent change. Patient denies any alteration in bowl movements no nausea or vomiting, but there is decrease in PO intake. In the ER patient received one unit of blood , when he was received the second unit, he spiked a tem of 38.5, no change in hemodynamics, work up for transfusion reaction is sent. Patient was not taking medication or following up with any doctor cause he believes in herbal medicine only. (09 Mar 2018 05:52)    PAST MEDICAL & SURGICAL HISTORY:  Anemia, unspecified type  Multiple myeloma, remission status unspecified      Vital Signs Last 24 Hrs  T(F): 98.4 (03-11-18 @ 08:00), Max: 98.6 (03-11-18 @ 01:14)  HR: 60 (03-11-18 @ 08:00) (50 - 98)  BP: 138/73 (03-11-18 @ 08:00) (58/37 - 181/101)  RR: 31 (03-11-18 @ 08:00) (28 - 80)  SpO2: 98% (03-11-18 @ 08:00) (90% - 100%)    Labs:                         6.9    23.00 )-----------( 132      ( 11 Mar 2018 05:16 )             21.4     03-11    138  |  98  |  79<HH>  ----------------------------<  147<H>  4.7   |  22  |  5.6<HH>    Ca    8.8      11 Mar 2018 05:16  Mg     2.4     03-11    TPro  8.2<H>  /  Alb  1.5<L>  /  TBili  2.1<H>  /  DBili  x   /  AST  44<H>  /  ALT  31  /  AlkPhos  68  03-11    CAPILLARY BLOOD GLUCOSE  150 (11 Mar 2018 08:00)  156 (10 Mar 2018 14:00)        PT/INR - ( 09 Mar 2018 11:03 )   PT: 16.80 sec;   INR: 1.54 ratio         PTT - ( 09 Mar 2018 11:03 )  PTT:55.6 sec    ABG - ( 11 Mar 2018 08:15 )  pH: 7.33  /  pCO2: x     /  pO2: x     / HCO3: x     / Base Excess: x     /  SaO2: x                   Culture - Blood (collected 09 Mar 2018 10:21)  Source: .Blood Blood  Preliminary Report (10 Mar 2018 16:00):    No growth to date.            MEDICATIONS  (STANDING):  chlorhexidine 0.12% Liquid 15 milliLiter(s) Swish and Spit two times a day  dexamethasone  IVPB 40 milliGRAM(s) IV Intermittent daily  fentaNYL   Infusion 2 MICROgram(s)/kG/Hr (14.98 mL/Hr) IV Continuous <Continuous>  heparin  Injectable 5000 Unit(s) SubCutaneous every 8 hours  influenza   Vaccine 0.5 milliLiter(s) IntraMuscular once  levoFLOXacin IVPB 250 milliGRAM(s) IV Intermittent every 48 hours  meropenem  IVPB 500 milliGRAM(s) IV Intermittent every 24 hours  norepinephrine Infusion 0.5 MICROgram(s)/kG/Min (35.109 mL/Hr) IV Continuous <Continuous>  pantoprazole  Injectable 40 milliGRAM(s) IV Push daily  propofol Infusion 5 MICROgram(s)/kG/Min (2.247 mL/Hr) IV Continuous <Continuous>  vancomycin  IVPB 1000 milliGRAM(s) IV Intermittent every 24 hours    MEDICATIONS  (PRN):      Physical Exam:  General: NAD, sedated  HEENT:   Lungs: B/L Rhinci  Heart: RRR, Normal S1S2  Abdomen: soft, NT/ND  Extremities: no c/c/e  Neuro: AAO x0    Radiology:

## 2018-03-11 NOTE — DIETITIAN INITIAL EVALUATION ADULT. - INDICATOR
Will monitor diet order, energy intake, labs, body composition, nutrition focused physical findings.

## 2018-03-11 NOTE — PROGRESS NOTE ADULT - ASSESSMENT
68 yo man presenting with MADISON/ MM/sepsis/respiratory failure/mixed metabolic and respiratory acidosis/anemia/hyperkalemia /hypercalcemia     ·	MADISON most likely ATN in nature  ·	sp HD x2  ·	No evidence of obstruction on CT  ·	Still anuric will reassess for HD in AM   ·	Hypercalcemia related to MM?  ·	Hem onc on case  ·	Started on steroids   ·	follow up BMP  ·	sepsis on vanco/ merrem and levaquin  ·	change vanco to post HD dosing please 1g post HD   ·	Anemia acut summer chronic sp transfusion keep Hb >7  ·	Respiratory failure on MV as per pulmonary repeat ABG today please     will follow

## 2018-03-11 NOTE — DIETITIAN INITIAL EVALUATION ADULT. - SIGNS/SYMPTOMS

Mild-to-moderate pt is currently receiving 58gm protein (51-64%needs) & 1296kcal (+343kcal from propofol)=87%needs.

## 2018-03-11 NOTE — DIETITIAN INITIAL EVALUATION ADULT. - DIET TYPE
NPO with tube feedings/TF order: Nepro @ 10ml/hr advance to goal of 50ml/hr via OGT, EN started last night, currenrly infusing @30ml/hr. Per KUN Castillo pt is tolerating feeds well.

## 2018-03-11 NOTE — PROGRESS NOTE ADULT - SUBJECTIVE AND OBJECTIVE BOX
Nephrology progress note    Patient is seen and examined, events over the last 24 h noted .  still on MV sp HD yesterday (second treatment)  Sp transfusion one unit blood     Allergies:  No Known Allergies    Hospital Medications:   MEDICATIONS  (STANDING):  chlorhexidine 0.12% Liquid 15 milliLiter(s) Swish and Spit two times a day  dexamethasone  IVPB 40 milliGRAM(s) IV Intermittent daily  fentaNYL   Infusion 2 MICROgram(s)/kG/Hr (14.98 mL/Hr) IV Continuous <Continuous>  heparin  Injectable 5000 Unit(s) SubCutaneous every 8 hours  influenza   Vaccine 0.5 milliLiter(s) IntraMuscular once  levoFLOXacin IVPB 250 milliGRAM(s) IV Intermittent every 48 hours  meropenem  IVPB 500 milliGRAM(s) IV Intermittent every 24 hours  norepinephrine Infusion 0.5 MICROgram(s)/kG/Min (35.109 mL/Hr) IV Continuous <Continuous>  pantoprazole  Injectable 40 milliGRAM(s) IV Push daily  propofol Infusion 5 MICROgram(s)/kG/Min (2.247 mL/Hr) IV Continuous <Continuous>  vancomycin  IVPB 1000 milliGRAM(s) IV Intermittent every 24 hours        VITALS:  T(F): 98.6 (18 @ 01:14), Max: 98.6 (18 @ 01:14)  HR: 64 (18 @ 05:59)  BP: 128/69 (18 @ 05:59)  RR: 32 (18 @ 05:59)  SpO2: 100% (18 @ 05:59)      :  -  03-10 @ 07:00  --------------------------------------------------------  IN: 3464.3 mL / OUT: 206 mL / NET: 3258.3 mL    03-10 @ 06:01  -   @ 07:00  --------------------------------------------------------  IN: 1579.9 mL / OUT: 1325 mL / NET: 254.9 mL      10 Mar 2018 06:01  -  11 Mar 2018 07:00  --------------------------------------------------------  IN:    Enteral Tube Flush: 40 mL    fentaNYL  Infusion: 153.5 mL    Nepro: 180 mL    norepinephrine Infusion: 406.5 mL    Packed Red Blood Cells: 291 mL    propofol Infusion: 258.9 mL    Sodium Chloride 0.9% IV Bolus: 250 mL  Total IN: 1579.9 mL    OUT:    Indwelling Catheter - Urethral: 25 mL    Other: 1300 mL  Total OUT: 1325 mL    Total NET: 254.9 mL            PHYSICAL EXAM:  Constitutional: on MV   HEENT: positive ETT  Neck: No JVD  Respiratory: CTAB, no wheezes, rales or rhonchi  Cardiovascular: S1, S2, RRR  Gastrointestinal: BS+, soft, NT/ND  Extremities: No cyanosis or clubbing. No peripheral edema  :  positive alva.   Skin: No rashes    LABS:      138  |  98  |  x   ----------------------------<  147<H>  4.7   |  22  |  x   Creatinine Trend: 5.6<--, 7.5<--, 8.9<--, 8.5<--, 7.6<--, 8.2<--  Ca    8.8      11 Mar 2018 05:16  Mg     2.4         TPro  8.2<H>  /  Alb  1.5<L>  /  TBili  2.1<H>  /  DBili      /  AST  44<H>  /  ALT  31  /  AlkPhos  68                            6.9    23.00 )-----------( 132      ( 11 Mar 2018 05:16 )             21.4   Blood Gas Profile - Arterial (03.10.18 @ 15:03)    pH, Arterial: 7.28    pCO2, Arterial: 52 mmHg    pO2, Arterial: 84 mmHg    HCO3, Arterial: 24 mmoL/L    Base Excess, Arterial: -2.9 mmoL/L    Oxygen Saturation, Arterial: 95 %    FIO2, Arterial: 40        Urine Studies:  Urinalysis Basic - ( 09 Mar 2018 02:34 )    Color: Dark Yellow / Appearance: Turbid / S.020 / pH:   Gluc:  / Ketone: Negative  / Bili: Negative / Urobili: 0.2 mg/dL   Blood:  / Protein: 100 mg/dL / Nitrite: Negative   Leuk Esterase: Negative / RBC: 10-25 /HPF / WBC    Sq Epi:  / Non Sq Epi: Moderate /HPF / Bacteria: Few /HPF

## 2018-03-11 NOTE — DIETITIAN INITIAL EVALUATION ADULT. - MD RECOMMEND
Please change EN order to Osmolite 1.5@35ml/hr and add Prostat 1pkt q 8 hrs via OGT (for total of 1560kcal, 98gm protein, 640ml h20), plus ~340kcal/d derived from IV lipids. Additional fluids per LIP. Please note that Nepro has high fat content and pt is already sedated w/ propofol. Also there is concern of fluid overload therefore RD will recommend more concentrated formula plus protein modular. Please change EN order to Osmolite 1.5@35ml/hr and add Prostat 1pkt q 8 hrs via OGT (for total of 1560kcal, 98gm protein, 640ml h20), plus ~340kcal/d derived from IV lipids. Additional fluids per LIP. Please note that Nepro has high fat content and pt is already sedated w/ propofol. Also there is concern of fluid overload therefore RD is recommending more concentrated formula plus protein modular.

## 2018-03-11 NOTE — PROGRESS NOTE ADULT - ASSESSMENT
67 year male patient with history of MM diagnosed in 2011, was on trial medication in [facility] for 3years, then did not seek any medical care thereafter presented for SOB, cough , generalized weakness and decreased po intake since 2 weeks.    #Septic Shock  2/2 PNA/ Acute hypoxic respiratory failure  - Intubated 3/9  - c/w Vanc/Meropenem/Levoquin  -Cultures NGTD  -On levophed  - Flu negative  - ID c/s placed  - G1DD on ECHO    #Multiple Myeloma/Renal failure  - s/p HD x2  - Four days of pulse Dexamethasone 40 MG IV per heme onc  - SPEP/UPEP, immunofixation serum, free light chains, IgA     #Anemia- Multifactorial   - Transfuse <7  - s/p ~5U PRBC, 1U today  -  Ferritin 1559, sat 21, Folate 13.9    #Elevated PT/PTT 2/2 Sepsis vs DIC   - D-dimer was 1490, Fibrinogen 567, not bleeding  - if bleeding  and PT/PTT>1.5 UPN  can administer 10-15ML of FFP as per heme onc  - Monitor PT/PTT and Fibrinogen if bleeding  - May need a dose of Vitamin K oral  1-2.5mg to prevent K deficiency since on antibiotics    DVT ppx- HSC

## 2018-03-12 NOTE — CONSULT NOTE ADULT - ASSESSMENT
IMPRESSION:  hypoxic resp failure  (ARDS?)  pneumonia  multiple myeloma  MADISON requiring HD  neutropenia and anemia    Estimated caloric needs by PSE currently ~1765 and protein needs ~112 gm/d.  Dietitian's input appreciated.   Considering propofol dose and possible ARDS as well as the MADISON, would change feeds to Vital High Protein at 60 ml/h (d/c the Prostat)  to provide 124 gm protein and (including the propofol) 1765 kcal/d with lower total omega 6 fa content and a better omega 3 content.   Total K 58.5 mEq/d and 1110 mg phos/d from these feeds.

## 2018-03-12 NOTE — PROGRESS NOTE ADULT - ASSESSMENT
AHRF/ SEVERE PNA/ ADVANCED MM RENAL FAILURE S/P HD, SEVERE ANEMIA NO ACTIVE BLEED, s/p 5 unit of prbc    - SEDATION VACATION, failed and re sedated  - Cont same vent settings  - Hold feeding for bronchiscopy  Add reglan for probable gastroparesis  - ID VANCO/ MARIN/ LEVAQUIN,   Vanco trough  Follow ID recommendations  - DVT PROPHYLAXIS  - HD satyrday, renal f/up today  -POOR PROGNOSIS AHRF/ SEVERE PNA/ ADVANCED MM RENAL FAILURE S/P HD, SEVERE ANEMIA NO ACTIVE BLEED, agitated off sedation    - SEDATION VACATION, failed and re sedated  - Cont same vent settings  - Hold feeding for bronchoscopy, will discuss with 2 sisters  Add reglan for probable gastroparesis  - ID VANCO/ MARIN/ LEVAQUIN,   Vanco trough  Follow ID recommendations  - DVT PROPHYLAXIS  - HD saturday, renal f/up today  -POOR PROGNOSIS    family want to discuss with hemoc, palliatice care evaluation.

## 2018-03-12 NOTE — PROGRESS NOTE ADULT - SUBJECTIVE AND OBJECTIVE BOX
Patient is a 67y old  Male who presents with a chief complaint of sob, weakness (09 Mar 2018 05:52)    EVENTs OVERNIGHT:none,got blood yesterday    HPI:  68 y/o M PMH Multiple Myeloma, Anemia who presents with weakness, SOB.  Patient had a dry cough and felt like he was sick for the past 2 weeks and this worsened over the past 2 days.  Patient is unable to walk without being extremely dyspneic , dyspnea gradually progressed to be at rest. Patient had chills in the last 2 days Patient is weak and not eating and not drinking well. Patient also has bone pain in his right flank.  Patient has not seen a doctor in numerous years and is not on medication.  Family felt he looked pale and weak. Patient denies any hematuria, melena or hematochezia. Patient is on iron tablets since 5 years, since then his stool is dark, with no recent change. Patient denies any alteration in bowl movements no nausea or vomiting, but there is decrease in PO intake. In the ER patient received one unit of blood , when he was received the second unit, he spiked a tem of 38.5, no change in hemodynamics, work up for transfusion reaction is sent. Patient was not taking medication or following up with any doctor cause he believes in herbal medicine only. (09 Mar 2018 05:52)    PAST MEDICAL & SURGICAL HISTORY:  Anemia, unspecified type  Multiple myeloma, remission status unspecified      ICU Vital Signs Last 24 Hrs  T(C): 36.3 (12 Mar 2018 04:00), Max: 37 (11 Mar 2018 20:00)  I&O's Summary    11 Mar 2018 07:01  -  12 Mar 2018 07:00  --------------------------------------------------------  IN: 1947.5 mL / OUT: 45 mL / NET: 1902.5 mL    T(F): 97.3 (12 Mar 2018 04:00), Max: 98.6 (11 Mar 2018 20:00)  HR: 74 (12 Mar 2018 06:00) (54 - 90)  BP: 115/64 (12 Mar 2018 06:00) (103/62 - 140/75)  BP(mean): 81 (12 Mar 2018 06:00) (74 - 110)  ABP: --  ABP(mean): --  RR: 27 (12 Mar 2018 06:00) (26 - 34)  SpO2: 99% (12 Mar 2018 06:00) (98% - 100%)    Labs:                                  7.0    16.89 )-----------( 94       ( 12 Mar 2018 05:39 )             21.4   03-11    138  |  98  |  79<HH>  ----------------------------<  147<H>  4.7   |  22  |  5.6<HH>    Ca    8.8      11 Mar 2018 05:16  Mg     2.4     03-11    TPro  8.2<H>  /  Alb  1.5<L>  /  TBili  2.1<H>  /  DBili  x   /  AST  44<H>  /  ALT  31  /  AlkPhos  68  03-11        PT/INR - ( 09 Mar 2018 11:03 )   PT: 16.80 sec;   INR: 1.54 ratio         PTT - ( 09 Mar 2018 11:03 )  PTT:55.6 sec    ABG - ( 11 Mar 2018 08:15 )  pH: 7.33  /  pCO2: x     /  pO2: x     / HCO3: x     / Base Excess: x     /  SaO2: x                   Culture - Blood (collected 09 Mar 2018 10:21)  Source: .Blood Blood  Preliminary Report (10 Mar 2018 16:00):    No growth to date.            MEDICATIONS  (STANDING):  chlorhexidine 0.12% Liquid 15 milliLiter(s) Swish and Spit two times a day  fentaNYL   Infusion 2 MICROgram(s)/kG/Hr (14.98 mL/Hr) IV Continuous <Continuous>  heparin  Injectable 5000 Unit(s) SubCutaneous every 8 hours  influenza   Vaccine 0.5 milliLiter(s) IntraMuscular once  levoFLOXacin IVPB 250 milliGRAM(s) IV Intermittent every 48 hours  meropenem  IVPB 500 milliGRAM(s) IV Intermittent every 24 hours  norepinephrine Infusion 0.5 MICROgram(s)/kG/Min (35.109 mL/Hr) IV Continuous <Continuous>  pantoprazole  Injectable 40 milliGRAM(s) IV Push daily  propofol Infusion 5 MICROgram(s)/kG/Min (2.247 mL/Hr) IV Continuous <Continuous>  vancomycin  IVPB 1000 milliGRAM(s) IV Intermittent every 24 hours    MEDICATIONS  (PRN):    Physical Exam:  General: NAD, sedated  HEENT:   Lungs: B/L cleari  Heart: RRR, Normal S1S2  Abdomen: soft, NT/ND  Extremities: no c/c/e  Neuro: AAO x0,off sedation    Radiology: Patient is a 67y old  Male who presents with a chief complaint of sob, weakness (09 Mar 2018 05:52)    EVENTs OVERNIGHT: none,got blood yesterday  plan of bronch today  s/p 2 sessions of HD    HPI:  68 y/o M PMH Multiple Myeloma, Anemia who presents with weakness, SOB.  Patient had a dry cough and felt like he was sick for the past 2 weeks and this worsened over the past 2 days.  Patient is unable to walk without being extremely dyspneic , dyspnea gradually progressed to be at rest. Patient had chills in the last 2 days Patient is weak and not eating and not drinking well. Patient also has bone pain in his right flank.  Patient has not seen a doctor in numerous years and is not on medication.  Family felt he looked pale and weak. Patient denies any hematuria, melena or hematochezia. Patient is on iron tablets since 5 years, since then his stool is dark, with no recent change. Patient denies any alteration in bowl movements no nausea or vomiting, but there is decrease in PO intake. In the ER patient received one unit of blood , when he was received the second unit, he spiked a tem of 38.5, no change in hemodynamics, work up for transfusion reaction is sent. Patient was not taking medication or following up with any doctor cause he believes in herbal medicine only. (09 Mar 2018 05:52)    PAST MEDICAL & SURGICAL HISTORY:  Anemia, unspecified type  Multiple myeloma, remission status unspecified      ICU Vital Signs Last 24 Hrs  T(C): 36.3 (12 Mar 2018 04:00), Max: 37 (11 Mar 2018 20:00)  I&O's Summary    11 Mar 2018 07:01  -  12 Mar 2018 07:00  --------------------------------------------------------  IN: 1947.5 mL / OUT: 45 mL / NET: 1902.5 mL    T(F): 97.3 (12 Mar 2018 04:00), Max: 98.6 (11 Mar 2018 20:00)  HR: 74 (12 Mar 2018 06:00) (54 - 90)  BP: 115/64 (12 Mar 2018 06:00) (103/62 - 140/75)  BP(mean): 81 (12 Mar 2018 06:00) (74 - 110)  ABP: --  ABP(mean): --  RR: 27 (12 Mar 2018 06:00) (26 - 34)  SpO2: 99% (12 Mar 2018 06:00) (98% - 100%)    Labs:                                  7.0    16.89 )-----------( 94       ( 12 Mar 2018 05:39 )             21.4   03-11    138  |  98  |  79<HH>  ----------------------------<  147<H>  4.7   |  22  |  5.6<HH>    Ca    8.8      11 Mar 2018 05:16  Mg     2.4     03-11    TPro  8.2<H>  /  Alb  1.5<L>  /  TBili  2.1<H>  /  DBili  x   /  AST  44<H>  /  ALT  31  /  AlkPhos  68  03-11        PT/INR - ( 09 Mar 2018 11:03 )   PT: 16.80 sec;   INR: 1.54 ratio         PTT - ( 09 Mar 2018 11:03 )  PTT:55.6 sec    ABG - ( 11 Mar 2018 08:15 )  pH: 7.33  /  pCO2: x     /  pO2: x     / HCO3: x     / Base Excess: x     /  SaO2: x                   Culture - Blood (collected 09 Mar 2018 10:21)  Source: .Blood Blood  Preliminary Report (10 Mar 2018 16:00):    No growth to date.    FLU A B RSV Detection by PCR (03.10.18 @ 21:20)    Flu A Result: Negative:   Flu B Result: Negative    MRSA PCR Result.: Negative: Notes  chase brown  By: Real-Time PCR (Polymerase Reaction Method) (03.09.18 @ 05:34)      MEDICATIONS  (STANDING):  chlorhexidine 0.12% Liquid 15 milliLiter(s) Swish and Spit two times a day  fentaNYL   Infusion 2 MICROgram(s)/kG/Hr (14.98 mL/Hr) IV Continuous <Continuous>  heparin  Injectable 5000 Unit(s) SubCutaneous every 8 hours  influenza   Vaccine 0.5 milliLiter(s) IntraMuscular once  levoFLOXacin IVPB 250 milliGRAM(s) IV Intermittent every 48 hours  meropenem  IVPB 500 milliGRAM(s) IV Intermittent every 24 hours  midazolam Infusion 1 mG/Hr (1 mL/Hr) IV Continuous <Continuous>  norepinephrine Infusion 0.5 MICROgram(s)/kG/Min (35.109 mL/Hr) IV Continuous <Continuous>  pantoprazole  Injectable 40 milliGRAM(s) IV Push daily  sodium chloride 0.9% Bolus 500 milliLiter(s) IV Bolus once    MEDICATIONS  (PRN):  metoclopramide Injectable 5 milliGRAM(s) IV Push every 8 hours PRN increse GI residual    Physical Exam:  General: NAD, sedated  HEENT: within in normal range  Lungs: B/L clear  Heart: RRR, Normal S1S2  Abdomen: soft, NT/ND  Extremities: no c/c/e  Neuro: AAO x0,off sedation    Radiology:< from: Xray Chest 1 View- PORTABLE-Routine (03.12.18 @ 05:10) >  Impression:      Bilateral lung opacities are unchanged. Support tubes as mentioned above.   No significant interval change as compared to prior study.      < end of copied text >

## 2018-03-12 NOTE — PROGRESS NOTE ADULT - ASSESSMENT
MADISON/MM/sepsis/respiratory failure/mixed metabolic and respiratory acidosis/anemia/hyperkalemia /hypercalcemia   # ATN in nature    # anuric   #off pressors   # corrected calcium : around 9.4,improving  ?secondary to MM followed  by hematology    # s/p HD yesterday saturday, HD in am   # on meropenem to 500 q 24  # check vanco to 1 g post HD, check level   # overall prognosis poor   # will follow

## 2018-03-12 NOTE — CONSULT NOTE ADULT - SUBJECTIVE AND OBJECTIVE BOX
67 year male patient with history of MM diagnosed in 2011, was on trial medication in [facility] for 3years, then did not seek any medical care in favor of herbal treatments. Presented 3/9  for SOB, cough , generalized weakness and decreased po intake x 2 weeks.  pt found to have multifocal pneumonia, became hypoxic, and was intubated. He did require pressors until this morning.  pt found to be in acute renal failure, requiring HD on 3/10.   +neutropenia and anemia - the latter is reportedly chronic, and pt has been taking Fe for many years.    ICU Vital Signs Last 24 Hrs  T(C): 37 (12 Mar 2018 12:00), Max: 37.1 (12 Mar 2018 08:00)  T(F): 98.6 (12 Mar 2018 12:00), Max: 98.8 (12 Mar 2018 08:00)  HR: 74 (12 Mar 2018 12:00) (54 - 98)  BP: 129/68 (12 Mar 2018 12:00) (93/52 - 131/74)  BP(mean): 87 (12 Mar 2018 12:00)   RR: 31 (12 Mar 2018 12:00) (26 - 47)  SpO2: 96% (12 Mar 2018 12:00) (93% - 100%)  vented, sedated with fentanyl and propofol.  OG Luce sump  abd soft, ND  + bitemp wasting in excess of expected for age; mod decreased calves and quads r/t age and bmi.  skin turgor ok.  R femoral [place] [id number] cm height, adm weight listed as 74.9 kg    MEDICATIONS  (STANDING):  chlorhexidine 0.12% Liquid 15 milliLiter(s) Swish and Spit two times a day  fentaNYL   Infusion 2 MICROgram(s)/kG/Hr (14.98 mL/Hr) IV Continuous <Continuous>  heparin  Injectable 5000 Unit(s) SubCutaneous every 8 hours  influenza   Vaccine 0.5 milliLiter(s) IntraMuscular once  levoFLOXacin IVPB 250 milliGRAM(s) IV Intermittent every 48 hours  meropenem  IVPB 500 milliGRAM(s) IV Intermittent every 24 hours  norepinephrine Infusion 0.5 MICROgram(s)/kG/Min (35.109 mL/Hr) IV Continuous - currently OFF  pantoprazole  Injectable 40 milliGRAM(s) IV Push daily  propofol Infusion 5 MICROgram(s)/kG/Min (2.247 mL/Hr) IV   -  currently at 13 ml/h  vancomycin  IVPB 1000 milliGRAM(s) IV Intermittent every 24 hours  Osmolite 1.5 at 35 ml/h and Prostat 3 packets per day                          7.0    16.89 )-----------( 94       ( 12 Mar 2018 05:39 )             21.4   03-12    141  |  103  |  136<HH>  ----------------------------<  149<H>  5.1<H>   |  17  |  7.4<HH>    Ca    8.4<L>      12 Mar 2018 05:39  Mg     2.8     03-12    TPro  8.2<H>  /  Alb  1.5<L>  /  TBili  2.1<H>  /  DBili  x   /  AST  44<H>  /  ALT  31  /  AlkPhos  68  03-11

## 2018-03-12 NOTE — PROGRESS NOTE ADULT - SUBJECTIVE AND OBJECTIVE BOX
DATE OF PROCEDURE: 03/12/2018    PREOPERATIVE DIAGNOSIS: pnemonia    POSTOPERATIVE DIAGNOSES: pnemonia      PROCEDURE PERFORMED:  Bronchoscopy, washing.         Attending: Dr leon luo         ASSISTANT: dr hernandez         ANESTHESIA: fentanyl and versed    CONSENT: After explanining the risk and benefit the consent was obtained from       The patient had been previously intubated and was on mechanical ventilatory support. He was on sedation, which was continued and adjusted during the procedure. His FiO2 was increased to 100% during the procedure. The  fiberoptic bronchoscope was introduced through an endotracheal tube adaptor and the tip of the endotracheal tube was noted to be in good position above the matthew.   The Right tracheobronchial tree was inspected closely to the level of the subsegmental bronchi. All bronchi are patent with no endobronchial lesions and no mucosal lesions noted.   The Left tracheobronchial tree was also patent and intact with the mucosa normal   The bronchoscope was then introduced to the right lobe and washings were taken from that area.  The procedure was completed and all samples were submitted for appropriate studies  After adequate clearing of secretions was accomplished, the bronchoscope was removed from the patient and the procedure was ended.   The patient tolerated the procedure well and there were no complications.

## 2018-03-12 NOTE — PROGRESS NOTE ADULT - SUBJECTIVE AND OBJECTIVE BOX
OVERNIGHT EVENTS: still intubated, ventilated, no pressors  no events overnight    Vital Signs Last 24 Hrs  T(C): 36.9 (11 Mar 2018 08:00), Max: 37 (11 Mar 2018 01:14)  T(F): 98.4 (11 Mar 2018 08:00), Max: 98.6 (11 Mar 2018 01:14)  HR: 60 (11 Mar 2018 08:00) (50 - 98)  BP: 138/73 (11 Mar 2018 08:00) (58/37 - 181/101)  BP(mean): 92 (11 Mar 2018 08:00) (42 - 132)  RR: 31 (11 Mar 2018 08:00) (28 - 80)  SpO2: 98% (11 Mar 2018 08:00) (90% - 100%)    PHYSICAL EXAMINATION:    GENERAL: sedated    HEENT: Head is normocephalic and atraumatic. Extraocular muscles are intact. Mucous membranes are moist.    NECK: Supple.    LUNGS: b/l rhonchi    HEART: Regular rate and rhythm without murmur.    ABDOMEN: Soft, nontender, and nondistended.      EXTREMITIES: Without any cyanosis, clubbing, rash, lesions or edema.    NEUROLOGIC: not following commands off sedation    SKIN: No ulceration or induration present.      LABS:                                            7.0    16.89 )-----------( 94       ( 12 Mar 2018 05:39 )             21.4   03-11    138  |  98  |  79<HH>  ----------------------------<  147<H>  4.7   |  22  |  5.6<HH>    Ca    8.8      11 Mar 2018 05:16  Mg     2.4     03-11    TPro  8.2<H>  /  Alb  1.5<L>  /  TBili  2.1<H>  /  DBili  x   /  AST  44<H>  /  ALT  31  /  AlkPhos  68  03-11    Ca    8.8      11 Mar 2018 05:16  Mg     2.4     03-11    TPro  8.2<H>  /  Alb  1.5<L>  /  TBili  2.1<H>  /  DBili  x   /  AST  44<H>  /  ALT  31  /  AlkPhos  68  03-11          ABG - ( 10 Mar 2018 15:03 )  pH: 7.32  /  pCO2: 39  /  pO2: 94    / HCO3: 24    / Base Excess: -2.9  /  SaO2: 95    / lac 2.3    VENT 450/32/40%/7.5            CARDIAC MARKERS ( 09 Mar 2018 11:03 )  0.58 ng/mL / x     / 280 U/L / x     / 3.4 ng/mL                FLU A B RSV Detection by PCR (03.10.18 @ 21:20)    Flu A Result: Negative: Negative results do not preclude influenza infection and  should not be used as the sole basis for treatment or  other patient management decisions.  A positive result may occur in the absence of viable virus.  By: [employer] Flu viral assay by Reverse Transcriptase  Polymerase Chain Reaction (RT-PCR).    Flu B Result: Negative        MEDICATIONS  (STANDING):  chlorhexidine 0.12% Liquid 15 milliLiter(s) Swish and Spit two times a day  fentaNYL   Infusion 2 MICROgram(s)/kG/Hr (14.98 mL/Hr) IV Continuous <Continuous>  heparin  Injectable 5000 Unit(s) SubCutaneous every 8 hours  influenza   Vaccine 0.5 milliLiter(s) IntraMuscular once  levoFLOXacin IVPB 250 milliGRAM(s) IV Intermittent every 48 hours  meropenem  IVPB 500 milliGRAM(s) IV Intermittent every 24 hours  pantoprazole  Injectable 40 milliGRAM(s) IV Push daily  propofol Infusion 5 MICROgram(s)/kG/Min (2.247 mL/Hr) IV Continuous <Continuous>  vancomycin  IVPB 1000 milliGRAM(s) IV Intermittent every 24 hours    MEDICATIONS  (PRN):      RADIOLOGY & ADDITIONAL STUDIES: BILATERAL OPACITIES,ET OVERNIGHT EVENTS: still intubated, ventilated, no pressors  no events overnight, last hd 2 days ago    Vital Signs Last 24 Hrs  T(C): 36.9 (11 Mar 2018 08:00), Max: 37 (11 Mar 2018 01:14)  T(F): 98.4 (11 Mar 2018 08:00), Max: 98.6 (11 Mar 2018 01:14)  HR: 60 (11 Mar 2018 08:00) (50 - 98)  BP: 138/73 (11 Mar 2018 08:00) (58/37 - 181/101)  BP(mean): 92 (11 Mar 2018 08:00) (42 - 132)  RR: 31 (11 Mar 2018 08:00) (28 - 80)  SpO2: 98% (11 Mar 2018 08:00) (90% - 100%)    PHYSICAL EXAMINATION:    GENERAL: sedated/ moves 4 extremities    HEENT: Head is normocephalic and atraumatic. Extraocular muscles are intact. Mucous membranes are moist.    NECK: Supple.    LUNGS: b/l rhonchi    HEART: Regular rate and rhythm without murmur.    ABDOMEN: Soft, nontender, and nondistended.      EXTREMITIES: Without any cyanosis, clubbing, rash, lesions or edema.    NEUROLOGIC: not following commands off sedation    SKIN: No ulceration or induration present.      LABS:                                            7.0    16.89 )-----------( 94       ( 12 Mar 2018 05:39 )             21.4   03-11    138  |  98  |  79<HH>  ----------------------------<  147<H>  4.7   |  22  |  5.6<HH>    Ca    8.8      11 Mar 2018 05:16  Mg     2.4     03-11    TPro  8.2<H>  /  Alb  1.5<L>  /  TBili  2.1<H>  /  DBili  x   /  AST  44<H>  /  ALT  31  /  AlkPhos  68  03-11    Ca    8.8      11 Mar 2018 05:16  Mg     2.4     03-11    TPro  8.2<H>  /  Alb  1.5<L>  /  TBili  2.1<H>  /  DBili  x   /  AST  44<H>  /  ALT  31  /  AlkPhos  68  03-11          ABG - ( 10 Mar 2018 15:03 )  pH: 7.32  /  pCO2: 39  /  pO2: 94    / HCO3: 24    / Base Excess: -2.9  /  SaO2: 95    / lac 2.3    VENT 450/32/40%/7.5            CARDIAC MARKERS ( 09 Mar 2018 11:03 )  0.58 ng/mL / x     / 280 U/L / x     / 3.4 ng/mL                FLU A B RSV Detection by PCR (03.10.18 @ 21:20)    Flu A Result: Negative: Negative results do not preclude influenza infection and  should not be used as the sole basis for treatment or  other patient management decisions.  A positive result may occur in the absence of viable virus.  By: [employer] Flu viral assay by Reverse Transcriptase  Polymerase Chain Reaction (RT-PCR).    Flu B Result: Negative        MEDICATIONS  (STANDING):  chlorhexidine 0.12% Liquid 15 milliLiter(s) Swish and Spit two times a day  fentaNYL   Infusion 2 MICROgram(s)/kG/Hr (14.98 mL/Hr) IV Continuous <Continuous>  heparin  Injectable 5000 Unit(s) SubCutaneous every 8 hours  influenza   Vaccine 0.5 milliLiter(s) IntraMuscular once  levoFLOXacin IVPB 250 milliGRAM(s) IV Intermittent every 48 hours  meropenem  IVPB 500 milliGRAM(s) IV Intermittent every 24 hours  pantoprazole  Injectable 40 milliGRAM(s) IV Push daily  propofol Infusion 5 MICROgram(s)/kG/Min (2.247 mL/Hr) IV Continuous <Continuous>  vancomycin  IVPB 1000 milliGRAM(s) IV Intermittent every 24 hours    MEDICATIONS  (PRN):      RADIOLOGY & ADDITIONAL STUDIES: reviewed

## 2018-03-12 NOTE — PROGRESS NOTE ADULT - SUBJECTIVE AND OBJECTIVE BOX
seen and examined  intubated/ventilated  no pressors       Standing Inpatient Medications  chlorhexidine 0.12% Liquid 15 milliLiter(s) Swish and Spit two times a day  fentaNYL   Infusion 2 MICROgram(s)/kG/Hr IV Continuous <Continuous>  heparin  Injectable 5000 Unit(s) SubCutaneous every 8 hours  influenza   Vaccine 0.5 milliLiter(s) IntraMuscular once  levoFLOXacin IVPB 250 milliGRAM(s) IV Intermittent every 48 hours  meropenem  IVPB 500 milliGRAM(s) IV Intermittent every 24 hours  norepinephrine Infusion 0.5 MICROgram(s)/kG/Min IV Continuous <Continuous>  pantoprazole  Injectable 40 milliGRAM(s) IV Push daily  propofol Infusion 5 MICROgram(s)/kG/Min IV Continuous <Continuous>  vancomycin  IVPB 1000 milliGRAM(s) IV Intermittent every 24 hours          VITALS/PHYSICAL EXAM  --------------------------------------------------------------------------------  T(C): 37.1 (03-12-18 @ 08:00), Max: 37.1 (03-12-18 @ 08:00)  HR: 98 (03-12-18 @ 08:15) (54 - 98)  BP: 112/70 (03-12-18 @ 08:00) (103/62 - 135/75)  RR: 47 (03-12-18 @ 08:15) (26 - 47)  SpO2: 97% (03-12-18 @ 08:15) (97% - 100%)  Wt(kg): --        03-11-18 @ 07:01  -  03-12-18 @ 07:00  --------------------------------------------------------  IN: 1947.5 mL / OUT: 45 mL / NET: 1902.5 mL    03-12-18 @ 07:01  -  03-12-18 @ 09:04  --------------------------------------------------------  IN: 55.5 mL / OUT: 10 mL / NET: 45.5 mL      Physical Exam:  	Gen: intubated/ventilated   	Pulm: B/L nory   	CV: S1S2; no rub  	Abd: nondistended  	LE:  no edema  	Vascular access: femoral udall     LABS/STUDIES  --------------------------------------------------------------------------------              7.0    16.89 >-----------<  94       [03-12-18 @ 05:39]              21.4     138  |  98  |  79  ----------------------------<  147      [03-11-18 @ 05:16]  4.7   |  22  |  5.6        Ca     8.8     [03-11-18 @ 05:16]      Mg     2.4     [03-11-18 @ 05:16]    TPro  8.2  /  Alb  1.5  /  TBili  2.1  /  DBili  x   /  AST  44  /  ALT  31  /  AlkPhos  68  [03-11-18 @ 05:16]          Creatinine Trend:  SCr 5.6 [03-11 @ 05:16]  SCr 7.5 [03-10 @ 06:18]  SCr 8.9 [03-09 @ 17:13]  SCr 8.5 [03-09 @ 11:03]  SCr 7.6 [03-09 @ 05:49]        Iron 28, TIBC 135, %sat 21      [03-09-18 @ 11:03]  Ferritin 1559      [03-09-18 @ 11:03]    HBsAb Nonreact      [03-09-18 @ 20:05]  HBsAg Nonreact      [03-09-18 @ 20:05]  HBcAb Nonreact      [03-09-18 @ 20:05]  HCV 0.03, Nonreact      [03-09-18 @ 20:05]

## 2018-03-12 NOTE — PROGRESS NOTE ADULT - ATTENDING COMMENTS
He underwent a Broch today, I was present, there were no secretions.  HE completed his day of Dexamethasone. I spoke with his sisters and they are aware of the poor prognosis >50%.  Contineu with supportive measures as above.   Will consider day 8 of dexametahsone 40 mg IV once if stable and possibly Velcade on day 8 if can give consent. Even his sisters informed me yesterday that they are not once he wakes up if he would agree to Velcade or cytoxan. The sisteres were provided with info on the drugs. He underwent a Broch today, I was present, there were no secretions.  HE completed his day of Dexamethasone. I spoke with his sisters and they are aware of the poor prognosis >50%.  Contineu with supportive measures as above.   Will consider day 8 of dexametahsone 40 mg IV once if stable and possibly Velcade on day 8 if can give consent. Even his sisters informed me yesterday that they are not once he wakes up if he would agree to Velcade or cytoxan. The sisters were provided with info on the drugs.  Her sister is at bedside.  Please see the my thrombocytopenia recommendation's as well, above

## 2018-03-12 NOTE — PROGRESS NOTE ADULT - SUBJECTIVE AND OBJECTIVE BOX
Patient seen and examined. Still remains intubated. Last HD 2 days ago.   Off pressors.    Vital Signs Last 24 Hrs  T(C): 37 (12 Mar 2018 10:00), Max: 37.1 (12 Mar 2018 08:00)  T(F): 98.6 (12 Mar 2018 10:00), Max: 98.8 (12 Mar 2018 08:00)  HR: 62 (12 Mar 2018 10:00) (54 - 98)  BP: 109/64 (12 Mar 2018 10:00) (93/52 - 135/74)  BP(mean): 76 (12 Mar 2018 10:00) (61 - 94)  RR: 31 (12 Mar 2018 10:00) (26 - 47)  SpO2: 97% (12 Mar 2018 10:00) (93% - 100%)  Allergies:No Known Allergies  Intolerances    MEDICATIONS  (STANDING):  chlorhexidine 0.12% Liquid 15 milliLiter(s) Swish and Spit two times a day  fentaNYL   Infusion 2 MICROgram(s)/kG/Hr (14.98 mL/Hr) IV Continuous <Continuous>  heparin  Injectable 5000 Unit(s) SubCutaneous every 8 hours  influenza   Vaccine 0.5 milliLiter(s) IntraMuscular once  levoFLOXacin IVPB 250 milliGRAM(s) IV Intermittent every 48 hours  meropenem  IVPB 500 milliGRAM(s) IV Intermittent every 24 hours  pantoprazole  Injectable 40 milliGRAM(s) IV Push daily  propofol Infusion 5 MICROgram(s)/kG/Min (2.247 mL/Hr) IV Continuous <Continuous>  vancomycin  IVPB 1000 milliGRAM(s) IV Intermittent every 24 hours    MEDICATIONS  (PRN):                          7.0    16.89 )-----------( 94       ( 12 Mar 2018 05:39 )             21.4   03-12    141  |  103  |  136<HH>  ----------------------------<  149<H>  5.1<H>   |  17  |  7.4<HH>    Ca    8.4<L>      12 Mar 2018 05:39  Mg     2.8     03-12    Auto Basophil % : 0.2 %               6.9    23.00 )-----------( 132      ( 03-11 @ 05:16 )             21.4                7.3    20.50 )-----------( 136      ( 03-10 @ 20:02 )             22.9                6.2    16.63 )-----------( 128      ( 03-10 @ 06:18 )             20.1                6.4    12.14 )-----------( 146      ( 03-09 @ 20:31 )             20.6                4.6    7.08  )-----------( 138      ( 03-09 @ 11:03 )             15.3                4.4    2.69  )-----------( 152      ( 03-08 @ 19:53 )             14.9       03-11    138  |  98  |  79<HH>  ----------------------------<  147<H>  4.7   |  22  |  5.6<HH>    Ca    8.8      11 Mar 2018 05:16  Mg     2.4     03-11    TPro  8.2<H>  /  Alb  1.5<L>  /  TBili  2.1<H>  /  DBili  x   /  AST  44<H>  /  ALT  31  /  AlkPhos  68  03-11  LIVER FUNCTIONS - ( 11 Mar 2018 05:16 )  Alb: 1.5 g/dL / Pro: 8.2 g/dL / ALK PHOS: 68 U/L / ALT: 31 U/L / AST: 44 U/L / GGT: x               Culture - Blood (collected 09 Mar 2018 10:21)  Source: .Blood Blood  Preliminary Report (10 Mar 2018 16:00):    No growth to date.        REVIEW OF SYSTEMS:  See above. He is intubated, the ROS was form nurse    HYSICAL EXAM:  GENERAL: Sedated, ET, OT and right TLC in place  HEAD:  Atraumatic, Normocephalic  EYES:  conjunctiva and sclera clear  CHEST/LUNG No wheeze, transmitted ET tube sounds and rhonchi in bases   HEART: Regular rate and rhythm; No murmurs, rubs, or gallops  ABDOMEN: Soft, Nontender, Nondistended; Bowel sounds present  EXTREMITIES:, No clubbing, cyanosis, or edema  NEUROLOGY: Sedated  SKIN: No rashes         ADDITIONAL STUDIES:      Skeletal survey (2011) Impression: Two lucencies within the skull, may represent a venous lake or benign process, correlate clinically with laboratory values ? No other lytic lesions identified ? C5-6 C6-7 degenerative disk disease cervical spine ? Mild bilateral knee degenerative changes     Textual Result SEE TEXT (2011)   RESULTS FOR PATIENT - #[id number]  ARLEY COKER  [GENO/LA LT CHAIN]  [id number] - [id number]  IRASEMA: 07/27/11 1733  RECV: 07/27/11 1927       FREE KAPPA SERU                24.3   H    mg/L          (3.3-19.4)       FREE LAMBDA SER                 2.0   L    mg/L          (5.7-26.3)       FREE K/L RATIO                12.15   H                 (0.26-1.65)    Result Name Results Units Reference Range Textual Result SEE TEXT   [id number] - [id number]  IRASEMA: 07/27/11 1733  RECV: 07/27/11 1927       IgA                            2944   H    mg/dL           ()    IMMUNOFIX SERUM (()) IgA-Kappa monoclonal protein present. Test Performed at: [place] [address] unless otherwise noted.      Bone marrow biopsy: 8/2011: Palmacytosis with increased kappa light chains. Patient seen and examined. Still remains intubated. Last HD 2 days ago.   Off pressors.    Vital Signs Last 24 Hrs  T(C): 37 (12 Mar 2018 10:00), Max: 37.1 (12 Mar 2018 08:00)  T(F): 98.6 (12 Mar 2018 10:00), Max: 98.8 (12 Mar 2018 08:00)  HR: 62 (12 Mar 2018 10:00) (54 - 98)  BP: 109/64 (12 Mar 2018 10:00) (93/52 - 135/74)  BP(mean): 76 (12 Mar 2018 10:00) (61 - 94)  RR: 31 (12 Mar 2018 10:00) (26 - 47)  SpO2: 97% (12 Mar 2018 10:00) (93% - 100%)  Allergies:No Known Allergies  Intolerances    MEDICATIONS  (STANDING):  chlorhexidine 0.12% Liquid 15 milliLiter(s) Swish and Spit two times a day  fentaNYL   Infusion 2 MICROgram(s)/kG/Hr (14.98 mL/Hr) IV Continuous <Continuous>  heparin  Injectable 5000 Unit(s) SubCutaneous every 8 hours  influenza   Vaccine 0.5 milliLiter(s) IntraMuscular once  levoFLOXacin IVPB 250 milliGRAM(s) IV Intermittent every 48 hours  meropenem  IVPB 500 milliGRAM(s) IV Intermittent every 24 hours  pantoprazole  Injectable 40 milliGRAM(s) IV Push daily  propofol Infusion 5 MICROgram(s)/kG/Min (2.247 mL/Hr) IV Continuous <Continuous>  vancomycin  IVPB 1000 milliGRAM(s) IV Intermittent every 24 hours    MEDICATIONS  (PRN):                          7.0    16.89 )-----------( 94       ( 12 Mar 2018 05:39 )             21.4   03-12    141  |  103  |  136<HH>  ----------------------------<  149<H>  5.1<H>   |  17  |  7.4<HH>    Ca    8.4<L>      12 Mar 2018 05:39  Mg     2.8     03-12    Auto Basophil % : 0.2 %               6.9    23.00 )-----------( 132      ( 03-11 @ 05:16 )             21.4                7.3    20.50 )-----------( 136      ( 03-10 @ 20:02 )             22.9                6.2    16.63 )-----------( 128      ( 03-10 @ 06:18 )             20.1                6.4    12.14 )-----------( 146      ( 03-09 @ 20:31 )             20.6                4.6    7.08  )-----------( 138      ( 03-09 @ 11:03 )             15.3                4.4    2.69  )-----------( 152      ( 03-08 @ 19:53 )             14.9       03-11    138  |  98  |  79<HH>  ----------------------------<  147<H>  4.7   |  22  |  5.6<HH>    Ca    8.8      11 Mar 2018 05:16  Mg     2.4     03-11    TPro  8.2<H>  /  Alb  1.5<L>  /  TBili  2.1<H>  /  DBili  x   /  AST  44<H>  /  ALT  31  /  AlkPhos  68  03-11  LIVER FUNCTIONS - ( 11 Mar 2018 05:16 )  Alb: 1.5 g/dL / Pro: 8.2 g/dL / ALK PHOS: 68 U/L / ALT: 31 U/L / AST: 44 U/L / GGT: x               Culture - Blood (collected 09 Mar 2018 10:21)  Source: .Blood Blood  Preliminary Report (10 Mar 2018 16:00):    No growth to date.    Protein Electrophoresis, Serum (03.09.18 @ 17:13)    Serum Protein Electrophoresis Interp: Two Beta-Migrating Paraproteins Identified    Mspike 2.9      REVIEW OF SYSTEMS:  See above. He is intubated, the ROS was form nurse    HYSICAL EXAM:  GENERAL: Sedated, ET, OT and right TLC in place  HEAD:  Atraumatic, Normocephalic  EYES:  conjunctiva and sclera clear  CHEST/LUNG No wheeze, transmitted ET tube sounds and rhonchi in bases   HEART: Regular rate and rhythm; No murmurs, rubs, or gallops  ABDOMEN: Soft, Nontender, Nondistended; Bowel sounds present  EXTREMITIES:, No clubbing, cyanosis, or edema  NEUROLOGY: Sedated  SKIN: No rashes         ADDITIONAL STUDIES:      Skeletal survey (2011) Impression: Two lucencies within the skull, may represent a venous lake or benign process, correlate clinically with laboratory values ? No other lytic lesions identified ? C5-6 C6-7 degenerative disk disease cervical spine ? Mild bilateral knee degenerative changes     Textual Result SEE TEXT (2011)   RESULTS FOR PATIENT - #[id number]  ARLEY COKER  [GENO/LA LT CHAIN]  [id number] - [id number]  IRASEMA: 07/27/11 1733  RECV: 07/27/11 1927       FREE KAPPA SERU                24.3   H    mg/L          (3.3-19.4)       FREE LAMBDA SER                 2.0   L    mg/L          (5.7-26.3)       FREE K/L RATIO                12.15   H                 (0.26-1.65)    Result Name Results Units Reference Range Textual Result SEE TEXT   [id number] - [id number]  IRASEMA: 07/27/11 1733  RECV: 07/27/11 1927       IgA                            2944   H    mg/dL           ()    IMMUNOFIX SERUM (()) IgA-Kappa monoclonal protein present. Test Performed at: [place] [address] unless otherwise noted.      Bone marrow biopsy: 8/2011: Palmacytosis with increased kappa light chains.

## 2018-03-12 NOTE — PROGRESS NOTE ADULT - ASSESSMENT
67 year male patient with history of MM diagnosed in 2011, was on trial medication in [facility] for 3years, then did not seek any medical care thereafter presented for SOB, cough , generalized weakness and decreased po intake since 2 weeks.    #Septic Shock  2/2 PNA/ Acute hypoxic respiratory failure  - Intubated 3/9  - c/w Vanc/Meropenem/Levoquin  -Cultures NGTD  -On levophed  - Flu negative  - ID c/s placed  - G1DD on ECHO    #Multiple Myeloma/Renal failure  - s/p HD x2  - Four days of pulse Dexamethasone 40 MG IV per heme onc  - SPEP/UPEP, immunofixation serum, free light chains, IgA     #Anemia- Multifactorial   - Transfuse <7  - s/p ~5U PRBC, 1U today  -  Ferritin 1559, sat 21, Folate 13.9    #Elevated PT/PTT 2/2 Sepsis vs DIC   - D-dimer was 1490, Fibrinogen 567, not bleeding  - if bleeding  and PT/PTT>1.5 UPN  can administer 10-15ML of FFP as per heme onc  - Monitor PT/PTT and Fibrinogen if bleeding  - May need a dose of Vitamin K oral  1-2.5mg to prevent K deficiency since on antibiotics    DVT ppx- [facility] 67 year male patient with history of MM diagnosed in 2011, was on trial medication in [facility] for 3years, then did not seek any medical care thereafter presented for SOB, cough , generalized weakness and decreased po intake since 2 weeks.    #Septic Shock  2/2 PNA/ Acute hypoxic respiratory failure  - Intubated 3/9  - c/w Vanc/Meropenem/Levoquin  -Cultures NGTD  -On levophed  - Flu negative  - ID c/s placed  - G1DD on ECHO    #Multiple Myeloma/Renal failure  - s/p HD x2  - Four days of pulse Dexamethasone 40 MG IV per heme onc  - SPEP/UPEP, immunofixation serum, free light chains, IgA     #Anemia- Multifactorial   - Transfuse <7  - s/p ~5U PRBC, 1U today  -  Ferritin 1559, sat 21, Folate 13.9    #Elevated PT/PTT 2/2 Sepsis vs DIC   - D-dimer was 1490, Fibrinogen 567, not bleeding  - if bleeding  and PT/PTT>1.5 UPN  can administer 10-15ML of FFP as per heme onc  - Monitor PT/PTT and Fibrinogen if bleeding  - May need a dose of Vitamin K oral  1-2.5mg to prevent K deficiency since on antibiotics    DVT ppx- [facility]    ADDENDUN;3/12/18 3 pm  plan of broch today   will send bronchial aspirates for cultures  follow repeat cultures  vanco on hold  diet adjusted as per DR Nita godinezous duplex ordered  no plan of HD today    poor prognosis

## 2018-03-12 NOTE — PROGRESS NOTE ADULT - ASSESSMENT
66 y/o M PMH Multiple Myeloma ( was  being treated with holistics approach, Anemia who presented  with weakness/ SOB for past 2 weeks , worsened over the past 2 days, admitted with diagnosis of sepsis due to multifocal pneumonia/ acute hypoxemic respiratory failure requiring intubation.    #Septic Shock  2/2 PNA/ Acute hypoxic respiratory failure  -essentially unchanged  -antibiotics as per team  -f/u pancx  -pressors as per team    #History of Multiple Myeloma now in Severe Renal failure/ Metabolic acidosis, the renal failure maybe multifactorial, sepsis and myeloma  -on 4 days of pulse Dexamethasone 40 MG IV dILY day 4 of 4 today  - spep/upep; immunofixation serum ; free light chains; IgA for new baseline are pending, will need Skeletal survey if his acute condition stabilizes pending  -will consider Velcade and or Cytoxan if stable and agrees since he did not want conventional treatment in past.    #Anemia/ multifactorial   Multiple myeloma/acute illness with bone marrow suppression  anemia in CKD  keep hb>7. transfusions  as needed   Ferritin 1559, sat 21, Folate 13.9    #lEvated pt/PTT possibly from DIC due to sepsis   -d-dimer was 1490, Fibrinogen 567, not bleeding  -if bleeding  and PT/PTT>1.5 UPN  can administer 10-15ML of FFP and if Fibrinogen is less than 100 can give Cryoprecipitate as well.  -monitor PT/PTT and Fibrinogen if bleeding  -may need a dose of Vitamin K oral  1-2.5mg to prevent K deficiency since on antibiotics    #Mild Hypercalcemia corrected calcium today is 8.8   -this is multifactorial and maybe due to Myeloma or  Renal failure and is decreasing  -it may respond to Steroids if from MM   and or HD  -no Bisphosphonates for now due to renal failure  -its trending down and is getting fluids form medications    #Elevated Bili and AST  -maybe due to organ dysfunction, liver from sepsis and or mild hemolysis from transfused PRBC 66 y/o M PMH Multiple Myeloma ( was  being treated with holistics approach, Anemia who presented  with weakness/ SOB for past 2 weeks , worsened over the past 2 days, admitted with diagnosis of sepsis due to multifocal pneumonia/ acute hypoxemic respiratory failure requiring intubation.    #Septic Shock  2/2 PNA/ Acute hypoxic respiratory failure  -essentially unchanged  -antibiotics as per team  -f/u pancx  -pressors as per team    #History of Multiple Myeloma now in Severe Renal failure/ Metabolic acidosis, the renal failure maybe multifactorial, sepsis and myeloma  -on 4 days of pulse Dexamethasone 40 MG IV dILY day 4 of 4 today  -upep; immunofixation serum ; free light chains; IgA for new baseline are pending CTs did show lytic lesions < from: CT Chest No Cont (03.09.18 @ 00:54) >  Osseous lucent lesions and overall demineralization compatible patient's  known multiple myeloma.    -will consider Velcade and or Cytoxan if stable and agrees since he did not want conventional treatment in past.    #Anemia/ multifactorial   Multiple myeloma/acute illness with bone marrow suppression  anemia in CKD  keep hb>7. transfusions  as needed   Ferritin 1559, sat 21, Folate 13.9    #lEvated pt/PTT possibly from DIC due to sepsis   -d-dimer was 1490, Fibrinogen 567, not bleeding  -if bleeding  and PT/PTT>1.5 UPN  can administer 10-15ML of FFP and if Fibrinogen is less than 100 can give Cryoprecipitate as well.  -monitor PT/PTT and Fibrinogen if bleeding  -may need a dose of Vitamin K oral  1-2.5mg to prevent K deficiency since on antibiotics    #Mild Hypercalcemia calcium is lower but no new albumin to correct  -this is multifactorial and maybe due to Myeloma or  Renal failure and is decreasing  -it may respond to Steroids if from MM   and or HD  -no Bisphosphonates for now due to renal failure  -its trending down and is getting fluids form medications    #Elevated Bili and AST  -maybe due to organ dysfunction, liver from sepsis and or mild hemolysis from transfused PRBC   -monitor LFTS 66 y/o M PMH Multiple Myeloma ( was  being treated with holistics approach, Anemia who presented  with weakness/ SOB for past 2 weeks , worsened over the past 2 days, admitted with diagnosis of sepsis due to multifocal pneumonia/ acute hypoxemic respiratory failure requiring intubation.    #Septic Shock  2/2 PNA/ Acute hypoxic respiratory failure  -essentially unchanged  -antibiotics as per team  -f/u pancx  -pressors as per team    #History of Multiple Myeloma now in Severe Renal failure/ Metabolic acidosis, the renal failure maybe multifactorial, sepsis and myeloma  -on 4 days of pulse Dexamethasone 40 MG IV dILY day 4 of 4 today  -upep; immunofixation serum ; free light chains; IgA for new baseline are pending CTs did show lytic lesions < from: CT Chest No Cont (03.09.18 @ 00:54) >  Osseous lucent lesions and overall demineralization compatible patient's  known multiple myeloma.    -will consider Velcade and or Cytoxan if stable and agrees since he did not want conventional treatment in past.    #Anemia/ multifactorial   Multiple myeloma/acute illness with bone marrow suppression  anemia in CKD  keep hb>7. transfusions  as needed   Ferritin 1559, sat 21, Folate 13.9    #lEvated pt/PTT possibly from DIC due to sepsis   -d-dimer was 1490, Fibrinogen 567, not bleeding  -if bleeding  and PT/PTT>1.5 UPN  can administer 10-15ML of FFP and if Fibrinogen is less than 100 can give Cryoprecipitate as well.  -monitor PT/PTT and Fibrinogen if bleeding  -may need a dose of Vitamin K oral  1-2.5mg to prevent K deficiency since on antibiotics    #Mild Hypercalcemia calcium is lower but no new albumin to correct  -this is multifactorial and maybe due to Myeloma or  Renal failure and is decreasing  -it may respond to Steroids if from MM   and or HD  -no Bisphosphonates for now due to renal failure  -its trending down and is getting fluids form medications    #Elevated Bili and AST  -maybe due to organ dysfunction, liver from sepsis and or mild hemolysis from transfused PRBC   -monitor LFTS    #New thrombocytopenia  -he is on Hep SC  and this is about  day 5. I doubt HIT the drop is not 50% and he has multiple other causes, infection, DIC,  medications thus would monitor for now  and if continues to Drop will need to stop hep sc and send a HIT serology panel.

## 2018-03-12 NOTE — CONSULT NOTE ADULT - SUBJECTIVE AND OBJECTIVE BOX
ARLEY COKER 67yMalePatient is a 67y old  Male who presents with a chief complaint of sob, weakness (09 Mar 2018 05:52)      Patient has history of:  No Known Allergies        ANEMIA;UNSPECIFIED TYPE;ACUTE RENAL FAILURE  ^ANEMIA;UNSPECIFIED TYPE;ACUTE RENAL FAILURE  No h/o HF  Handoff  MEWS Score  Anemia, unspecified type  Multiple myeloma, remission status unspecified  Anemia, unspecified type  Pneumonia due to infectious organism, unspecified laterality, unspecified part of lung  Acute renal failure, unspecified acute renal failure type  Anemia, unspecified type  Multiple myeloma, remission status unspecified  COUGH BACK PAIN AND WEAKNESS  Pneumonia  Multiple myeloma, remission status unspecified  Multifocal pneumonia  Septic shock  Acute renal failure, unspecified acute renal failure type        Patient treated with:  levoFLOXacin IVPB 250 milliGRAM(s) IV Intermittent every 48 hours  meropenem  IVPB 500 milliGRAM(s) IV Intermittent every 24 hours  vancomycin  IVPB 1000 milliGRAM(s) IV Intermittent every 24 hours        PHYSICAL EXAM  T(F): 97.3 (18 @ 04:00), Max: 98.6 (18 @ 20:00)  HR: 74 (18 @ 06:00) (54 - 90)  BP: 115/64 (18 @ 06:00) (103/62 - 140/75)  RR: 27 (18 @ 06:00) (26 - 34)  SpO2: 99% (18 @ 06:00) (98% - 100%)  Daily     Daily Weight in k.8 (12 Mar 2018 06:00)  HEENT: normal, no nuchal rigidity  Cor: RSR Nl S1 S2  Lungs: clear  Decreased breath sounds at bases    Abdomen: Nontender, Nl BS,     Ext: No clubbing,cyanosis or edema    LAB & RADIOLOGIC RESULTS:                        7.0    16.89 )-----------( 94       ( 12 Mar 2018 05:39 )             21.4         -11    138  |  98  |  79<HH>  ----------------------------<  147<H>  4.7   |  22  |  5.6<HH>    Mg     2.4     -    TPro  8.2<H>  /  Alb  1.5<L>  /  TBili  2.1<H>  /  DBili  x   /  AST  44<H>  /  ALT  31  /  AlkPhos  68  03-11         Lactic Acidosis   Lactate, Blood: 4.0 mmol/L (18 @ 11:03)  Blood Gas Venous - Lactate: 5.4 mmoL/L (18 @ 03:41)  Lactate, Blood: 6.0 mmol/L (18 @ 20:34)  Blood Gas Venous - Lactate: 6.3 mmoL/L (18 @ 20:06)    ABG - ( 12 Mar 2018 07:07 )  pH: 7.32  /  pCO2: 39    /  pO2: 94    / HCO3: 20    / Base Excess: -5.7  /  SaO2: 97                  Urinalysis Basic - ( 12 Mar 2018 00:47 )    Color: Red / Appearance: Turbid / S.025 / pH: x  Gluc: x / Ketone: Negative  / Bili: Small / Urobili: 0.2 mg/dL   Blood: x / Protein: 30 mg/dL / Nitrite: Negative   Leuk Esterase: Trace / RBC: x / WBC 6-10 /HPF   Sq Epi: x / Non Sq Epi: Moderate /HPF / Bacteria: Many /HPF          Culture - Blood (collected 09 Mar 2018 10:21)  Source: .Blood Blood  Preliminary Report (10 Mar 2018 16:00):    No growth to date.          CT chest: bilateral small pleural effusions, right greater than left. There are multifocal airspace   opacities. The most prominent opacification is of the right lower lobe

## 2018-03-12 NOTE — CONSULT NOTE ADULT - ASSESSMENT
IMPRESSION  Admitted with Severe Sepsis (temp>101F,  resp rate>20/min, wbc<4, systolic hypotension, shock (still on levophed), lactic acidosis, acute renal failure (ATN) due to suspected Gram negative pneumonia (Multiple myeloma)    Resolved hyponatremia, bilat pleural effusions     SUGGESTIONs  Obtain blood culturesx2  Urine C&S; Suction for Sputum C&S  Urine legionella antigen  Vanco Trough    Continue levoFLOXacin IVPB 250 milliGRAM(s) IV Intermittent every 48 hours  meropenem  IVPB 500 milliGRAM(s) IV Intermittent every 24 hours  vancomycin  IVPB 1000 milliGRAM(s) IV Intermittent every 24 hours    Repeat white blood cell count

## 2018-03-13 NOTE — CONSULT NOTE ADULT - SUBJECTIVE AND OBJECTIVE BOX
Patient is a 67y old  Male who presents with a chief complaint of sob, weakness (09 Mar 2018 05:52)  called for endocarditis assessement    HPI: Patient admitted for sob, respiratory distress, fatigue, anemia, received transfusion, dyspnea progressed intubated, sedated, is on 50% FIO2, on antibiotic therapy, and due to bacteremia there was a suspicion of endocarditis.  FIO2 50%, 7.5 PEEP, sedated but light and is mildly combative     66 y/o M PMH Multiple Myeloma, Anemia who presents with weakness, SOB.  dry cough  dyspnea gradually progressed to be at rest with chills, fatigue, lack of appetite,   patient had not seen a doctor for many years nd has not taken medicine.  received 2 u of PRBC, and spiked a temperature to 38.5      PAST MEDICAL & SURGICAL HISTORY:  Anemia, unspecified type  Multiple myeloma, remission status unspecified      PREVIOUS DIAGNOSTIC TESTING:      ECHO  FINDINGS:  < from: Transthoracic Echocardiogram (18 @ 09:16) >  1. Spectral Doppler shows impaired relaxation pattern of left   ventricular myocardial filling (Grade I diastolic dysfunction).    < end of copied text >    STRESS TEST  FINDINGS:    CATHETERIZATION  FINDINGS:    MEDICATIONS  (STANDING):  chlorhexidine 0.12% Liquid 15 milliLiter(s) Swish and Spit two times a day  dexmedetomidine Infusion 0.2 MICROgram(s)/kG/Hr (3.745 mL/Hr) IV Continuous <Continuous>  fentaNYL   Infusion 2 MICROgram(s)/kG/Hr (14.98 mL/Hr) IV Continuous <Continuous>  influenza   Vaccine 0.5 milliLiter(s) IntraMuscular once  levoFLOXacin IVPB 250 milliGRAM(s) IV Intermittent every 48 hours  meropenem  IVPB 500 milliGRAM(s) IV Intermittent every 24 hours  micafungin IVPB      norepinephrine Infusion 0.5 MICROgram(s)/kG/Min (35.109 mL/Hr) IV Continuous <Continuous>  pantoprazole  Injectable 40 milliGRAM(s) IV Push daily  senna 1 Tablet(s) Oral two times a day    MEDICATIONS  (PRN):  metoclopramide Injectable 5 milliGRAM(s) IV Push every 8 hours PRN increse GI residual      FAMILY HISTORY:  not known    SOCIAL HISTORY:  CIGARETTES: ex smoker  ALCOHOL:  DRUGS:                      REVIEW OF SYSTEMS:  intubated, sedated not able to give hx        Vital Signs Last 24 Hrs  T(C): 36.1 (13 Mar 2018 16:00), Max: 36.9 (13 Mar 2018 03:00)  T(F): 97 (13 Mar 2018 16:00), Max: 98.4 (13 Mar 2018 03:00)  HR: 70 (13 Mar 2018 18:00) (46 - 80)  BP: 113/59 (13 Mar 2018 18:00) (81/51 - 131/73)  BP(mean): 75 (13 Mar 2018 18:00) (58 - 101)  RR: 32 (13 Mar 2018 18:00) (24 - 34)  SpO2: 96% (13 Mar 2018 18:00) (93% - 100%)                      PHYSICAL EXAM:  GENERAL: No distress, well developed  HEAD:  Atraumatic, Normocephalic  NECK: Supple, No JVD, No Bruit of either carotid arteries  NERVOUS SYSTEM: sedated, arousable, aggitated  CHEST/LUNG: decreased air entry to lung base right, No wheeze or rhonchi, but coarse crackles  HEART: Regular heart beat, S1, A2, P2, No S3, No gallop  ABDOMEN: Soft, Non tender, Non distended; Bowel sounds present  EXTREMITIES:  2+ Peripheral Pulses, No clubbing, No edema    TELEMETRY: NSR hr 65-75    ECG:  < from: 12 Lead ECG (03.10.18 @ 07:15) >  Diagnosis Line Normal sinus rhythm  Peaked T-waves, consider hyperkalemia    < end of copied text >    I&O's Detail    12 Mar 2018 07:01  -  13 Mar 2018 07:00  --------------------------------------------------------  IN:    Enteral Tube Flush: 120 mL    fentaNYL  Infusion: 168.8 mL    IV PiggyBack: 600 mL    midazolam Infusion: 42 mL    Osmolite: 140 mL    propofol Infusion: 110.5 mL    Vital High Protein: 620 mL  Total IN: 1801.3 mL    OUT:    Indwelling Catheter - Urethral: 54 mL  Total OUT: 54 mL    Total NET: 1747.3 mL      13 Mar 2018 07:01  -  13 Mar 2018 18:25  --------------------------------------------------------  IN:    dexmedetomidine Infusion: 48.9 mL    Enteral Tube Flush: 90 mL    fentaNYL  Infusion: 127.5 mL    IV PiggyBack: 400 mL    midazolam Infusion: 4.5 mL    norepinephrine Infusion: 10 mL    Packed Red Blood Cells: 500 mL    Vital High Protein: 205 mL  Total IN: 1385.9 mL    OUT:    Indwelling Catheter - Urethral: 51 mL    Other: 2000 mL  Total OUT: 2051 mL    Total NET: -665.2 mL          LABS:                        8.8    13.11 )-----------( 65       ( 13 Mar 2018 17:19 )             26.3         139  |  100  |  196<HH>  ----------------------------<  129<H>  5.7<H>   |  18  |  9.3<HH>    Ca    7.4<L>      13 Mar 2018 05:45  Mg     3.1         TPro  7.1  /  Alb  1.4<L>  /  TBili  1.6<H>  /  DBili  x   /  AST  33  /  ALT  30  /  AlkPhos  58      CARDIAC MARKERS ( 13 Mar 2018 05:45 )  0.26 ng/mL / x     / 157 U/L / x     / x            Urinalysis Basic - ( 12 Mar 2018 00:47 )    Color: Red / Appearance: Turbid / S.025 / pH: x  Gluc: x / Ketone: Negative  / Bili: Small / Urobili: 0.2 mg/dL   Blood: x / Protein: 30 mg/dL / Nitrite: Negative   Leuk Esterase: Trace / RBC: x / WBC 6-10 /HPF   Sq Epi: x / Non Sq Epi: Moderate /HPF / Bacteria: Many /HPF      I&O's Summary    12 Mar 2018 07:  -  13 Mar 2018 07:00  --------------------------------------------------------  IN: 1801.3 mL / OUT: 54 mL / NET: 1747.3 mL    13 Mar 2018 07:01  -  13 Mar 2018 18:25  --------------------------------------------------------  IN: 1385.9 mL / OUT: 205 mL / NET: -665.2 mL        RADIOLOGY & ADDITIONAL STUDIES:

## 2018-03-13 NOTE — PROGRESS NOTE ADULT - SUBJECTIVE AND OBJECTIVE BOX
His blood counts dropped today but it maybe dilutional. He is undergoing a PRBC transfusion now. Repeat CBC is pending. He had Lower extremity dopplers that were negative. Respiratory status unchanged on 50% FIO2 and PEEP of 7.5        Vital Signs Last 24 Hrs  T(C): 36.4 (13 Mar 2018 12:00), Max: 36.9 (12 Mar 2018 16:00)  T(F): 97.5 (13 Mar 2018 12:00), Max: 98.4 (12 Mar 2018 16:00)  HR: 56 (13 Mar 2018 12:00) (56 - 90)  BP: 81/51 (13 Mar 2018 12:00) (81/51 - 150/71)  BP(mean): 58 (13 Mar 2018 12:00) (58 - 98)  RR: 32 (13 Mar 2018 12:00) (24 - 35)  SpO2: 94% (13 Mar 2018 12:00) (93% - 100%)    Allergies:No Known Allergies  Intolerances  MEDICATIONS  (STANDING):  chlorhexidine 0.12% Liquid 15 milliLiter(s) Swish and Spit two times a day  dexmedetomidine Infusion 0.2 MICROgram(s)/kG/Hr (3.745 mL/Hr) IV Continuous <Continuous>  fentaNYL   Infusion 2 MICROgram(s)/kG/Hr (14.98 mL/Hr) IV Continuous <Continuous>  influenza   Vaccine 0.5 milliLiter(s) IntraMuscular once  levoFLOXacin IVPB 250 milliGRAM(s) IV Intermittent every 48 hours  meropenem  IVPB 500 milliGRAM(s) IV Intermittent every 24 hours  micafungin IVPB      micafungin IVPB 100 milliGRAM(s) IV Intermittent once  norepinephrine Infusion 0.5 MICROgram(s)/kG/Min (35.109 mL/Hr) IV Continuous <Continuous>  pantoprazole  Injectable 40 milliGRAM(s) IV Push daily  vancomycin  IVPB 1000 milliGRAM(s) IV Intermittent every 24 hours    MEDICATIONS  (PRN):  metoclopramide Injectable 5 milliGRAM(s) IV Push every 8 hours PRN increse GI residual      CBC Full  -  ( 13 Mar 2018 05:45 )  WBC Count : 13.62 K/uL  Hemoglobin : 5.3 g/dL  Hematocrit : 16.2 %  Platelet Count - Automated : 76 K/uL  Mean Cell Volume : 88.0 fL  Mean Cell Hemoglobin : 28.8 pg  Mean Cell Hemoglobin Concentration : 32.7 g/dL  Auto Neutrophil # : 12.46 K/uL  Auto Lymphocyte # : 0.45 K/uL  Auto Monocyte # : 0.29 K/uL  Auto Eosinophil # : 0.00 K/uL  Auto Basophil # : 0.00 K/uL  Auto Neutrophil % : 91.5 %  Auto Lymphocyte % : 3.3 %  Auto Monocyte % : 2.1 %  Auto Eosinophil % : 0.0 %  Auto Basophil % : 0.0 %               5.3    13.62 )-----------( 76       ( 03-13 @ 05:45 )             16.2                7.0    16.89 )-----------( 94       ( 03-12 @ 05:39 )             21.4                6.9    23.00 )-----------( 132      ( 03-11 @ 05:16 )             21.4                7.3    20.50 )-----------( 136      ( 03-10 @ 20:02 )             22.9                6.2    16.63 )-----------( 128      ( 03-10 @ 06:18 )             20.1                6.4    12.14 )-----------( 146      ( 03-09 @ 20:31 )             20.6                4.6    7.08  )-----------( 138      ( 03-09 @ 11:03 )             15.3                4.4    2.69  )-----------( 152      ( 03-08 @ 19:53 )             14.9       03-13    139  |  100  |  196<HH>  ----------------------------<  129<H>  5.7<H>   |  18  |  9.3<HH>    Ca    7.4<L>      13 Mar 2018 05:45  Mg     3.1     03-13    TPro  7.1  /  Alb  1.4<L>  /  TBili  1.6<H>  /  DBili  x   /  AST  33  /  ALT  30  /  AlkPhos  58  03-13  LIVER FUNCTIONS - ( 13 Mar 2018 05:45 )  Alb: 1.4 g/dL / Pro: 7.1 g/dL / ALK PHOS: 58 U/L / ALT: 30 U/L / AST: 33 U/L / GGT: x             Culture - Fungal, Body Fluid (collected 12 Mar 2018 17:12)  Source: Pleural Fl Pleural Fluid  Preliminary Report (13 Mar 2018 07:28):    Testing in progress    DDITIONAL STUDIES:      Skeletal survey (2011) Impression: Two lucencies within the skull, may represent a venous lake or benign process, correlate clinically with laboratory values ? No other lytic lesions identified ? C5-6 C6-7 degenerative disk disease cervical spine ? Mild bilateral knee degenerative changes     Textual Result SEE TEXT (2011)   RESULTS FOR PATIENT - #[id number]  ARLEY COKER  [GENO/LA LT CHAIN]  [id number] - [id number]  IRASEMA: 07/27/11 1733  RECV: 07/27/11 1927       FREE KAPPA SERU                24.3   H    mg/L          (3.3-19.4)       FREE LAMBDA SER                 2.0   L    mg/L          (5.7-26.3)       FREE K/L RATIO                12.15   H                 (0.26-1.65)    Result Name Results Units Reference Range Textual Result SEE TEXT   [id number] - [id number]  IRASEMA: 07/27/11 1733  RECV: 07/27/11 1927       IgA                            2944   H    mg/dL           ()    IMMUNOFIX SERUM (()) IgA-Kappa monoclonal protein present. Test Performed at: [place] [address] unless otherwise noted.      Bone marrow biopsy: 8/2011: Palmacytosis with increased kappa light chains.          REVIEW OF SYSTEMS:    Unable to obtain this he is intubated and sedated.      PYSICAL EXAM:  GENERAL: Sedated, ET, OT and right TLC in place  HEAD:  Atraumatic, Normocephalic  EYES:  conjunctiva and sclera clear  CHEST/LUNG No wheeze, transmitted ET tube sounds and rhonchi in bases   HEART: Regular rate and rhythm; No murmurs, rubs, or gallops  ABDOMEN: Soft, Nontender, Nondistended; Bowel sounds present  EXTREMITIES:, No clubbing, cyanosis, or edema  NEUROLOGY: Sedated  SKIN: No rashes

## 2018-03-13 NOTE — PROGRESS NOTE ADULT - ASSESSMENT
Multilobar PNA especially on the RUL and also on the left.  R/O endocarditis with mets. Doubt with negative BCs.  Send off deep ET cultures. Check urine for Legionella antigen.  Vanco 1250mg post HD.  Tnrp897 mg iv q24h.  Meropenem 750 mg iv q24h

## 2018-03-13 NOTE — PROGRESS NOTE ADULT - SUBJECTIVE AND OBJECTIVE BOX
PGY I NOTE    LENGTH OF HOSPITAL STAY:  4d    CHIEF COMPLAINT:Patient is a 67y old  Male who presents with a chief complaint of sob, weakness (09 Mar 2018 05:52)    Pt seen and examined at bedside. Sedated. No pressors. HD today. Will transfuse 2U PRBC. d/c heparin sub-q 2/2 drop in platelet. SCD's ordered. Will f/u with heme/onc    HPI:HPI:  68 y/o M PMH Multiple Myeloma, Anemia who presents with weakness, SOB.  Patient had a dry cough and felt like he was sick for the past 2 weeks and this worsened over the past 2 days.  Patient is unable to walk without being extremely dyspneic , dyspnea gradually progressed to be at rest. Patient had chills in the last 2 days Patient is weak and not eating and not drinking well. Patient also has bone pain in his right flank.  Patient has not seen a doctor in numerous years and is not on medication.  Family felt he looked pale and weak. Patient denies any hematuria, melena or hematochezia. Patient is on iron tablets since 5 years, since then his stool is dark, with no recent change. Patient denies any alteration in bowl movements no nausea or vomiting, but there is decrease in PO intake. In the ER patient received one unit of blood , when he was received the second unit, he spiked a tem of 38.5, no change in hemodynamics, work up for transfusion reaction is sent. Patient was not taking medication or following up with any doctor cause he believes in herbal medicine only. (09 Mar 2018 05:52)    OVERNIGHT EVENTS/INTERVAL UPDATES:    PMH & PSH  PAST MEDICAL & SURGICAL HISTORY:  Anemia, unspecified type  Multiple myeloma, remission status unspecified    SOCIAL HISTORY: Negative    ALLERGIES: No Known Allergies    HOME MEDICATIONS  Home Medications:    PHYSICAL EXAM:  T(F): 97.7 (18 @ 08:00), Max: 98.6 (18 @ 12:00)  HR: 60 (18 @ 11:00)  BP: 95/59 (18 @ 11:00)  RR: 32 (18 @ 11:00)  SpO2: 94% (18 @ 11:00)  CAPILLARY BLOOD GLUCOSE          18 @ 07:01  -  18 @ 07:00  --------------------------------------------------------  IN:    Enteral Tube Flush: 120 mL    fentaNYL  Infusion: 168.8 mL    IV PiggyBack: 600 mL    midazolam Infusion: 42 mL    Osmolite: 140 mL    propofol Infusion: 110.5 mL    Vital High Protein: 620 mL  Total IN: 1801.3 mL    OUT:    Indwelling Catheter - Urethral: 54 mL  Total OUT: 54 mL    Total NET: 1747.3 mL      18 @ 07:01  -  18 @ 11:11  --------------------------------------------------------  IN:    fentaNYL  Infusion: 30 mL    midazolam Infusion: 4.5 mL    Vital High Protein: 45 mL  Total IN: 79.5 mL    OUT:    Indwelling Catheter - Urethral: 27 mL  Total OUT: 27 mL    Total NET: 52.5 mL        Mode: AC/ CMV (Assist Control/ Continuous Mandatory Ventilation), RR (machine): 32, TV (machine): 450, FiO2: 50, PEEP: 7.5, ITime: 1, MAP: 17, PIP: 25    General: Sedated,  HEENT: NCAT  CV: RRR  RESP: Decreased breath sounds  Abdominal: Soft, NTTP  Extremity: no c/c/e  Neuro: A&O x0,  moves all four extremities does not follow commands    MEDICATIONS  STANDING MEDICATIONS  chlorhexidine 0.12% Liquid 15 milliLiter(s) Swish and Spit two times a day  dexmedetomidine Bolus 74 MICROGram(s) IV Bolus once  dexmedetomidine Infusion 0.2 MICROgram(s)/kG/Hr IV Continuous <Continuous>  fentaNYL   Infusion 2 MICROgram(s)/kG/Hr IV Continuous <Continuous>  influenza   Vaccine 0.5 milliLiter(s) IntraMuscular once  levoFLOXacin IVPB 250 milliGRAM(s) IV Intermittent every 48 hours  meropenem  IVPB 500 milliGRAM(s) IV Intermittent every 24 hours  micafungin IVPB      norepinephrine Infusion 0.5 MICROgram(s)/kG/Min IV Continuous <Continuous>  pantoprazole  Injectable 40 milliGRAM(s) IV Push daily  vancomycin  IVPB 1000 milliGRAM(s) IV Intermittent every 24 hours    PRN MEDICATIONS  metoclopramide Injectable 5 milliGRAM(s) IV Push every 8 hours PRN    LABS:                        5.3    13.62 )-----------( 76       ( 13 Mar 2018 05:45 )             16.2              13    139  |  100  |  196<HH>  ----------------------------<  129<H>  5.7<H>   |  18  |  9.3<HH>    Ca    7.4<L>      13 Mar 2018 05:45  Mg     3.1         TPro  7.1  /  Alb  1.4<L>  /  TBili  1.6<H>  /  DBili  x   /  AST  33  /  ALT  30  /  AlkPhos  58      LIVER FUNCTIONS - ( 13 Mar 2018 05:45 )  Alb: 1.4 g/dL / Pro: 7.1 g/dL / ALK PHOS: 58 U/L / ALT: 30 U/L / AST: 33 U/L / GGT: x                        CARDIAC MARKERS ( 13 Mar 2018 05:45 )  0.26 ng/mL / x     / 157 U/L / x     / x                      ABG - ( 13 Mar 2018 07:44 )  pH: 7.28  /  pCO2: 36    /  pO2: 94    / HCO3: 17    / Base Excess: -8.7  /  SaO2: 97                Urinalysis Basic - ( 12 Mar 2018 00:47 )    Color: Red / Appearance: Turbid / S.025 / pH: x  Gluc: x / Ketone: Negative  / Bili: Small / Urobili: 0.2 mg/dL   Blood: x / Protein: 30 mg/dL / Nitrite: Negative   Leuk Esterase: Trace / RBC: x / WBC 6-10 /HPF   Sq Epi: x / Non Sq Epi: Moderate /HPF / Bacteria: Many /HPF        Culture - Fungal, Body Fluid (collected 12 Mar 2018 17:12)  Source: Pleural Fl Pleural Fluid  Preliminary Report (13 Mar 2018 07:28):    Testing in progress      IMAGING:      ASSESSMENT & PLANS:    CNS:    HEENT:    PULMONARY:    CARIOVASCULAR:    GI:    RENAL:    INFECTIOUS DISEASE:    HEMATOLOGICAL:    ENDOCRINE:    MUSCULOSKELETAL:

## 2018-03-13 NOTE — CONSULT NOTE ADULT - CONSULT REQUESTED DATE/TIME
09-Mar-2018
09-Mar-2018 08:04
09-Mar-2018 09:30
12-Mar-2018 08:25
13-Mar-2018
13-Mar-2018
09-Mar-2018 07:52
12-Mar-2018 12:48

## 2018-03-13 NOTE — CONSULT NOTE ADULT - ASSESSMENT
68 y/o male being evaluated for goals of care with pm as above. Patient is orally intubated and sedated, with no symptoms to address. Family at bedside d/w palliative NP regarding GOC. Family aware of poor prognosis and are hopeful patient pulls through. Family wants all explore all possible medical  interventions at this time, also will want patient to be resuscitated if necessary. Pallative team will continue to provide support counselling to family. Family knows that the team will be there for them if patient does not improve clinically.           Recommendations:  ongoing CCU mgt  palliative med introduced.   full code status.

## 2018-03-13 NOTE — CONSULT NOTE ADULT - ASSESSMENT
thrombocytopenia, anemia  hypoalbuminemia, severe  advanced renal failure, guess MADISON and some CKD  sepsis, respiratory failure, bacteremia, multiple findings mimicks endocarditis    will do AARON at AM to assess for vegetation  cancel second echo today  I had a talk with his sister and obtained consent from her.  continue with respiratory care, antibiotic therapy  keep NPO after midnight

## 2018-03-13 NOTE — PROGRESS NOTE ADULT - ASSESSMENT
67 year male patient with history of MM diagnosed in 2011, was on trial medication in [facility] for 3years, then did not seek any medical care thereafter presented for SOB, cough , generalized weakness and decreased po intake since 2 weeks.    #Septic Shock  2/2 PNA/ Acute hypoxic respiratory failure  - Intubated 3/9  - c/w Vanc/Meropenem/Levoquin  - Added Micrafungin per fellow  - Cultures NGTD  - Flu negative, RVP pending  - G1DD on ECHO  - s/p Bronc 3/12    #Multiple Myeloma/Renal failure  - s/p HD x2  - HD today  - Can start pressors if BP drops  - s/p four days of pulse Dexamethasone 40 MG IV per heme onc  - Palliative c/s placed    #Anemia- Multifactorial   - Transfuse <7  - s/p 6U PRBC. 2u today    # Thrombocytopenia/Elevated PT/PTT 2/2 Sepsis vs DIC/ HIT  - d/c HSC, SCD's  - LE doppler negative for DVT   - D-dimer 1490, Fibrinogen 567, not bleeding  - if bleeding  and PT/PTT>1.5 UPN  can administer 10-15ML of FFP as per heme onc  - Monitor PT/PTT and Fibrinogen if bleeding  - May need a dose of Vitamin K oral  1-2.5mg to prevent K deficiency since on antibiotics    DVT ppx- SCD's  Poor prognosis

## 2018-03-13 NOTE — PROGRESS NOTE ADULT - ASSESSMENT
AHRF/ SEVERE PNA/ ADVANCED MM RENAL FAILURE S/P HD, SEVERE ANEMIA NO ACTIVE BLEED, agitated off sedation    CNS: Sedation vacation    PULM: No vent changes for now    CARDIO: I=O    RENAL: Renal fup, HD today     HEM ONC: They discussed with family regarding poor prognosis, no intervention planned at this moment, finished high dose decadron dose  CBC post transfusion    GI: OG feeding, slow the rate if high residual    ID: Cont abx and add Micafungin    Palliative care cosnult    follow up bronchoscopy cultures AHRF/ SEVERE PNA/ ADVANCED MM RENAL FAILURE S/P HD, SEVERE ANEMIA NO ACTIVE BLEED, agitated off sedation    CNS: Sedation vacation    PULM: No vent changes for now    CARDIO: I=O    RENAL: Renal fup, HD today     HEM ONC: They discussed with family regarding poor prognosis, no intervention planned at this moment, finished high dose decadron dose  CBC post transfusion    GI: OG feeding, slow the rate if high residual    ID: Cont abx and add Micafungin f/up wash result, ID RECOMMENDATION  PALLIATIVE CARE    Palliative care cosnult    follow up bronchoscopy cultures

## 2018-03-13 NOTE — PROGRESS NOTE ADULT - SUBJECTIVE AND OBJECTIVE BOX
ARLEY COKER  67y, Male      OVERNIGHT EVENTS:    No fevers, no pressors, on vent, sedated, nonresponsive, no diarrhea, R IJ catheter in place.    VITALS:  T(F): 98.2, Max: 98.8 (18 @ 08:00)  HR: 61  BP: 92/53  RR: 28Vital Signs Last 24 Hrs  T(C): 36.8 (13 Mar 2018 04:00), Max: 37.1 (12 Mar 2018 08:00)  T(F): 98.2 (13 Mar 2018 04:00), Max: 98.8 (12 Mar 2018 08:00)  HR: 61 (13 Mar 2018 06:00) (57 - 98)  BP: 92/53 (13 Mar 2018 06:00) (92/53 - 150/71)  BP(mean): 66 (13 Mar 2018 05:00) (61 - 96)  RR: 28 (13 Mar 2018 06:00) (24 - 313)  SpO2: 96% (13 Mar 2018 06:00) (93% - 100%)    TESTS & MEASUREMENTS:                        7.0    16.89 )-----------( 94       ( 12 Mar 2018 05:39 )             21.4     03-    141  |  103  |  136<HH>  ----------------------------<  149<H>  5.1<H>   |  17  |  7.4<HH>    Ca    8.4<L>      12 Mar 2018 05:39  Mg     2.8               Culture - Blood (collected 18 @ 10:21)  Source: .Blood Blood  Preliminary Report (03-10-18 @ 16:00):    No growth to date.      Urinalysis Basic - ( 12 Mar 2018 00:47 )    Color: Red / Appearance: Turbid / S.025 / pH: x  Gluc: x / Ketone: Negative  / Bili: Small / Urobili: 0.2 mg/dL   Blood: x / Protein: 30 mg/dL / Nitrite: Negative   Leuk Esterase: Trace / RBC: x / WBC 6-10 /HPF   Sq Epi: x / Non Sq Epi: Moderate /HPF / Bacteria: Many /HPF          RADIOLOGY & ADDITIONAL TESTS:    ANTIBIOTICS:  levoFLOXacin IVPB 250 milliGRAM(s) IV Intermittent every 48 hours  meropenem  IVPB 500 milliGRAM(s) IV Intermittent every 24 hours

## 2018-03-13 NOTE — CONSULT NOTE ADULT - ATTENDING COMMENTS
Above noted. UDALL catheter in place, no bleeding.
Patient seen, plan as above.   We will follow  Call [phone] PRN
Patient seen examined the above plan was made with resident and I modified it where  needed.  We spoke to his sister who stated they were willing to accept  standard treatment for his myeloma. He was on alternative/holistic approach   as outpatient and has not seen an oncologist in some time as per available info.    If his condition stabilized tomorrow can start Dexamethasone 40 mg iv for 4 days if no objections from ICU team. If his condition improves, extubated, will add on Velcade and possibly Cytoxan if he agrees.     Transfusion support as above.    Hold hep sc if bleeds.    Monitor blood work including, CBC, CMP, PT/PTT and Fibrinogen.     His prognosis is poor given current clinical picture, 100% FIO12 and need for HD.

## 2018-03-13 NOTE — CONSULT NOTE ADULT - SUBJECTIVE AND OBJECTIVE BOX
REQUESTED OF: DR Farr    CLINICAL ISSUE TO BE EVALUATED BY CONSULTANT: Goals of care and symptoms management        This is a 68 y/o male with PMH of Multiple Myeloma, Anemia who presents with weakness, SOB. Patient was admitted for sepsis 2/2 PNA. Hospital stay was complicated by AHRF requiring intubation, and  MADISON on CCVH, Patient refused chemotherapy previously for his Multiple Myeoloma. Patient       PAST MEDICAL & SURGICAL HISTORY:  Anemia, unspecified type  Multiple myeloma, remission status unspecified        PHYSICAL EXAM:      Constitutional:    Eyes:    ENMT:    Neck:    Breasts:    Back:    Respiratory:    Cardiovascular:    Gastrointestinal:    Genitourinary:    Rectal:    Extremities:    Vascular:    Neurological:    Skin:    Lymph Nodes:    Musculoskeletal:    Psychiatric:        T(C): 36.2, Max: 36.9 (03:00)  HR: 46 (46 - 78)  BP: 117/64 (81/51 - 127/77)  RR: 33 (24 - 34)  SpO2: 98% (93% - 100%)      LABS/STUDIES:  03-13    139  |  100  |  196<HH>  ----------------------------<  129<H>  5.7<H>   |  18  |  9.3<HH>    Ca    7.4<L>      13 Mar 2018 05:45  Mg     3.1     03-13    TPro  7.1  /  Alb  1.4<L>  /  TBili  1.6<H>  /  DBili  x   /  AST  33  /  ALT  30  /  AlkPhos  58  03-13                            5.3    13.62 )-----------( 76       ( 13 Mar 2018 05:45 )             16.2       MEDICATIONS  (STANDING):  chlorhexidine 0.12% Liquid 15 milliLiter(s) Swish and Spit two times a day  dexmedetomidine Infusion 0.2 MICROgram(s)/kG/Hr (3.745 mL/Hr) IV Continuous <Continuous>  fentaNYL   Infusion 2 MICROgram(s)/kG/Hr (14.98 mL/Hr) IV Continuous <Continuous>  influenza   Vaccine 0.5 milliLiter(s) IntraMuscular once  levoFLOXacin IVPB 250 milliGRAM(s) IV Intermittent every 48 hours  meropenem  IVPB 500 milliGRAM(s) IV Intermittent every 24 hours  micafungin IVPB      norepinephrine Infusion 0.5 MICROgram(s)/kG/Min (35.109 mL/Hr) IV Continuous <Continuous>  pantoprazole  Injectable 40 milliGRAM(s) IV Push daily    MEDICATIONS  (PRN):  metoclopramide Injectable 5 milliGRAM(s) IV Push every 8 hours PRN increse GI residual        [place] Symptom Assesment Scale      PPS (Palliative Performance Scale): REQUESTED OF: DR Farr    CLINICAL ISSUE TO BE EVALUATED BY CONSULTANT: Goals of care and symptoms management        This is a 66 y/o male with PMH of Multiple Myeloma, Anemia who presents with weakness and shortness of breath. Patient was admitted for septic shock 2/2 multifocal PNA. Hospital stay was complicated by AHRF requiring intubation, metabolic acidosis and MADISON on CCVH, Patient refused chemotherapy previously for his Multiple Myeoloma. Patient hs no advanced directive and prognosis is poor.       PAST MEDICAL & SURGICAL HISTORY:  Anemia, unspecified type  Multiple myeloma, remission status unspecified        PHYSICAL EXAM:    Gen: orally intubated and sedated. NAD  All other system unable to be assessed          T(C): 36.2, Max: 36.9 (03:00)  HR: 46 (46 - 78)  BP: 117/64 (81/51 - 127/77)  RR: 33 (24 - 34)  SpO2: 98% (93% - 100%)      LABS/STUDIES:  03-13    139  |  100  |  196<HH>  ----------------------------<  129<H>  5.7<H>   |  18  |  9.3<HH>    Ca    7.4<L>      13 Mar 2018 05:45  Mg     3.1     03-13    TPro  7.1  /  Alb  1.4<L>  /  TBili  1.6<H>  /  DBili  x   /  AST  33  /  ALT  30  /  AlkPhos  58  03-13                            5.3    13.62 )-----------( 76       ( 13 Mar 2018 05:45 )             16.2       MEDICATIONS  (STANDING):  chlorhexidine 0.12% Liquid 15 milliLiter(s) Swish and Spit two times a day  dexmedetomidine Infusion 0.2 MICROgram(s)/kG/Hr (3.745 mL/Hr) IV Continuous <Continuous>  fentaNYL   Infusion 2 MICROgram(s)/kG/Hr (14.98 mL/Hr) IV Continuous <Continuous>  influenza   Vaccine 0.5 milliLiter(s) IntraMuscular once  levoFLOXacin IVPB 250 milliGRAM(s) IV Intermittent every 48 hours  meropenem  IVPB 500 milliGRAM(s) IV Intermittent every 24 hours  micafungin IVPB      norepinephrine Infusion 0.5 MICROgram(s)/kG/Min (35.109 mL/Hr) IV Continuous <Continuous>  pantoprazole  Injectable 40 milliGRAM(s) IV Push daily    MEDICATIONS  (PRN):  metoclopramide Injectable 5 milliGRAM(s) IV Push every 8 hours PRN increse GI residual        [place] Symptom Assesment Scale      PPS (Palliative Performance Scale):

## 2018-03-13 NOTE — PROGRESS NOTE ADULT - SUBJECTIVE AND OBJECTIVE BOX
seen and examined  intubated/ventilated  off sedation  off pressors       Standing Inpatient Medications  chlorhexidine 0.12% Liquid 15 milliLiter(s) Swish and Spit two times a day  dexmedetomidine Bolus 74 MICROGram(s) IV Bolus once  dexmedetomidine Infusion 0.2 MICROgram(s)/kG/Hr IV Continuous <Continuous>  fentaNYL   Infusion 2 MICROgram(s)/kG/Hr IV Continuous <Continuous>  influenza   Vaccine 0.5 milliLiter(s) IntraMuscular once  levoFLOXacin IVPB 250 milliGRAM(s) IV Intermittent every 48 hours  meropenem  IVPB 500 milliGRAM(s) IV Intermittent every 24 hours  midazolam Infusion 1 mG/Hr IV Continuous <Continuous>  norepinephrine Infusion 0.5 MICROgram(s)/kG/Min IV Continuous <Continuous>  pantoprazole  Injectable 40 milliGRAM(s) IV Push daily              VITALS/PHYSICAL EXAM  --------------------------------------------------------------------------------  T(C): 36.5 (03-13-18 @ 08:00), Max: 37 (03-12-18 @ 12:00)  HR: 64 (03-13-18 @ 10:00) (57 - 90)  BP: 127/77 (03-13-18 @ 10:00) (92/53 - 150/71)  RR: 29 (03-13-18 @ 10:00) (24 - 35)  SpO2: 93% (03-13-18 @ 10:00) (93% - 100%)  Wt(kg): --        03-12-18 @ 07:01  -  03-13-18 @ 07:00  --------------------------------------------------------  IN: 1801.3 mL / OUT: 54 mL / NET: 1747.3 mL    03-13-18 @ 07:01  -  03-13-18 @ 10:16  --------------------------------------------------------  IN: 41.5 mL / OUT: 27 mL / NET: 14.5 mL      Physical Exam:  	Gen: intubated, ventilated  	Pulm:  B/L nory   	CV: S1S2; no rub  	Abd: distended  	LE:  no edema  	Vascular access: femoral udall    LABS/STUDIES  --------------------------------------------------------------------------------              5.3    13.62 >-----------<  76       [03-13-18 @ 05:45]              16.2     139  |  100  |  196  ----------------------------<  129      [03-13-18 @ 05:45]  5.7   |  18  |  9.3        Ca     7.4     [03-13-18 @ 05:45]      Mg     3.1     [03-13-18 @ 05:45]    TPro  7.1  /  Alb  1.4  /  TBili  1.6  /  DBili  x   /  AST  33  /  ALT  30  /  AlkPhos  58  [03-13-18 @ 05:45]        Troponin 0.26      [03-13-18 @ 05:45]        [03-13-18 @ 05:45]          Iron 28, TIBC 135, %sat 21      [03-09-18 @ 11:03]  Ferritin 1559      [03-09-18 @ 11:03]    HBsAb Nonreact      [03-09-18 @ 20:05]  HBsAg Nonreact      [03-09-18 @ 20:05]  HBcAb Nonreact      [03-09-18 @ 20:05]  HCV 0.03, Nonreact      [03-09-18 @ 20:05]    Free Light Chains: kappa 311.00, lambda 0.40, ratio = 777.50      [03-09 @ 17:13]  Immunofixation Serum:   Two IgA Kappa bands Identified    Reference Range: None Detected      [03-09-18 @ 17:13]  SPEP Interpretation: Two Beta-Migrating Paraproteins Identified      [03-09-18 @ 17:13]

## 2018-03-13 NOTE — PROGRESS NOTE ADULT - ASSESSMENT
MADISON/MM/sepsis/respiratory failure/mixed metabolic and respiratory acidosis/anemia/hyperkalemia /hypercalcemia   # ?ATN in nature    # anuric   #off pressors   # calcium improving  # HD today, standard bath, ur 2 liters as tolerated   # s/p bronchoscopy  #ID/hematology notes appreciated ? to start chemo   # tx 2 units of PRBC   # overall prognosis poor   # will follow

## 2018-03-13 NOTE — PROGRESS NOTE ADULT - ASSESSMENT
66 y/o M PMH Multiple Myeloma ( was  being treated with holistics approach, Anemia who presented  with weakness/ SOB for past 2 weeks , worsened over the past 2 days, admitted with diagnosis of sepsis due to multifocal pneumonia/ acute hypoxemic respiratory failure requiring intubation.    #Septic Shock  2/2 PNA/ Acute hypoxic respiratory failure  - unchanged  -antibiotics as per team  -f/u pancx and Bronch results  -pressors as per team    #History of Multiple Myeloma now in Severe Renal failure/ Metabolic acidosis, the renal failure maybe multifactorial, sepsis and myeloma  -S/P 4 days  of pulse Dexamethasone 40 MG IV daily now Day 5  -Ts did show lytic lesions < from: CT Chest No Cont (03.09.18 @ 00:54) >  Osseous lucent lesions and overall demineralization compatible patient's  known multiple myeloma.  -SPEP Mspike was 2.9 Immunoglobulin Free Light Chains, Serum (03.09.18 @ 17:13)    [place]/Lambda Free Light Chain Ratio, Serum: 777.50 Ratio    NANI Kappa: 311.00 mg/dL    NANI Lambda: 0.40 mg/dL  -will consider Velcade and or Cytoxan if stable and agrees since he did not want conventional treatment in past.    #Anemia/ multifactorial   Multiple myeloma/acute illness with bone marrow suppression  anemia in CKD  keep hb>7. transfusions  as needed   Ferritin 1559, sat 21, Folate 13.9  -Repeat CBC from today pending    #lEvated pt/PTT possibly from DIC due to sepsis   -d-dimer was 1490, Fibrinogen 567, not bleeding  -if bleeding  and PT/PTT>1.5 UPN  can administer 10-15ML of FFP and if Fibrinogen is less than 100 can give Cryoprecipitate as well.  -monitor PT/PTT and Fibrinogen if bleeding  -may need a dose of Vitamin K oral  1-2.5mg to prevent K deficiency since on antibiotics    #Mild Hypercalcemia calcium is trending down  -this is multifactorial and maybe due to Myeloma or  Renal failure and is decreasing  -responding to Steroids for MM   and or HD  -no Bisphosphonates for now due to renal failure      #Elevated Bili and AST  -maybe due to organ dysfunction, liver from sepsis and or mild hemolysis from transfused PRBC   -monitor LFTS    #New thrombocytopenia a bit worse today. May have been diluted  -repeat CBC is peinding if drooping    -he is on Hep SC  and this  started about  day 5. I doubt HIT the drop is not 50% and he has multiple other causes, infection, DIC,  medications thus if  Drops will need to stop hep sc and send a HIT serology panel but would not start DTI. His Dopplers 3/13 negative    Prognosis is poor.

## 2018-03-13 NOTE — PROGRESS NOTE ADULT - SUBJECTIVE AND OBJECTIVE BOX
OVERNIGHT EVENTS: still intubated, ventilated, no pressors  still not following commands off sedation  bronchoscopy yesterday      Vital Signs Last 24 Hrs  T(C): 36.9 (11 Mar 2018 08:00), Max: 37 (11 Mar 2018 01:14)  T(F): 98.4 (11 Mar 2018 08:00), Max: 98.6 (11 Mar 2018 01:14)  HR: 60 (11 Mar 2018 08:00) (50 - 98)  BP: 138/73 (11 Mar 2018 08:00) (58/37 - 181/101)  BP(mean): 92 (11 Mar 2018 08:00) (42 - 132)  RR: 31 (11 Mar 2018 08:00) (28 - 80)  SpO2: 98% (11 Mar 2018 08:00) (90% - 100%)    PHYSICAL EXAMINATION:    GENERAL: sedated/ moves 4 extremities    HEENT: Head is normocephalic and atraumatic. Extraocular muscles are intact. Mucous membranes are moist.    NECK: Supple.    LUNGS: b/l rhonchi    HEART: Regular rate and rhythm without murmur.    ABDOMEN: Soft, nontender, and nondistended.      EXTREMITIES: Without any cyanosis, clubbing, rash, lesions or edema.    NEUROLOGIC: not following commands off sedation    SKIN: No ulceration or induration present.      LABS:                                                                  5.3    13.62 )-----------( 76       ( 13 Mar 2018 05:45 )             16.2   03-13    139  |  100  |  196<HH>  ----------------------------<  129<H>  5.7<H>   |  18  |  9.3<HH>    Ca    7.4<L>      13 Mar 2018 05:45  Mg     3.1     03-13    TPro  7.1  /  Alb  1.4<L>  /  TBili  1.6<H>  /  DBili  x   /  AST  33  /  ALT  30  /  AlkPhos  58  03-13    ABG - ( 13 Mar 2018 15:03 )  pH: 7.28  /  pCO2: 36  /  pO2: 94    / HCO3: 24    / Base Excess: -2.9  /  SaO2: 95    / lac 1.8    VENT 450/32/50%/7.5            CARDIAC MARKERS ( 09 Mar 2018 11:03 )  0.58 ng/mL / x     / 280 U/L / x     / 3.4 ng/mL                FLU A B RSV Detection by PCR (03.10.18 @ 21:20)    Flu A Result: Negative: Negative results do not preclude influenza infection and  should not be used as the sole basis for treatment or  other patient management decisions.  A positive result may occur in the absence of viable virus.  By: [employer] Flu viral assay by Reverse Transcriptase  Polymerase Chain Reaction (RT-PCR).    Flu B Result: Negative        MEDICATIONS  (STANDING):  chlorhexidine 0.12% Liquid 15 milliLiter(s) Swish and Spit two times a day  dexmedetomidine Bolus 74 MICROGram(s) IV Bolus once  dexmedetomidine Infusion 0.2 MICROgram(s)/kG/Hr (3.745 mL/Hr) IV Continuous <Continuous>  fentaNYL   Infusion 2 MICROgram(s)/kG/Hr (14.98 mL/Hr) IV Continuous <Continuous>  influenza   Vaccine 0.5 milliLiter(s) IntraMuscular once  levoFLOXacin IVPB 250 milliGRAM(s) IV Intermittent every 48 hours  meropenem  IVPB 500 milliGRAM(s) IV Intermittent every 24 hours  pantoprazole  Injectable 40 milliGRAM(s) IV Push daily    MEDICATIONS  (PRN):  metoclopramide Injectable 5 milliGRAM(s) IV Push every 8 hours PRN increse GI residual      RADIOLOGY & ADDITIONAL STUDIES: unchanged bilateral opacities, ET to be pushed by 1cm, OG is ok OVERNIGHT EVENTS: still intubated, ventilated, no pressors  still not following commands off sedation  bronchoscopy yesterday. wash done      Vital Signs Last 24 Hrs  T(C): 36.9 (11 Mar 2018 08:00), Max: 37 (11 Mar 2018 01:14)  T(F): 98.4 (11 Mar 2018 08:00), Max: 98.6 (11 Mar 2018 01:14)  HR: 60 (11 Mar 2018 08:00) (50 - 98)  BP: 138/73 (11 Mar 2018 08:00) (58/37 - 181/101)  BP(mean): 92 (11 Mar 2018 08:00) (42 - 132)  RR: 31 (11 Mar 2018 08:00) (28 - 80)  SpO2: 98% (11 Mar 2018 08:00) (90% - 100%)    PHYSICAL EXAMINATION:    GENERAL: sedated/ moves 4 extremities not following commands    HEENT: Head is normocephalic and atraumatic. Extraocular muscles are intact. Mucous membranes are moist.    NECK: Supple.    LUNGS: b/l rhonchi    HEART: Regular rate and rhythm without murmur.    ABDOMEN: Soft, nontender, and nondistended.      EXTREMITIES: Without any cyanosis, clubbing, rash, lesions or edema.    NEUROLOGIC: not following commands off sedation    SKIN: No ulceration or induration present.      LABS:                                                                  5.3    13.62 )-----------( 76       ( 13 Mar 2018 05:45 )             16.2   03-13    139  |  100  |  196<HH>  ----------------------------<  129<H>  5.7<H>   |  18  |  9.3<HH>    Ca    7.4<L>      13 Mar 2018 05:45  Mg     3.1     03-13    TPro  7.1  /  Alb  1.4<L>  /  TBili  1.6<H>  /  DBili  x   /  AST  33  /  ALT  30  /  AlkPhos  58  03-13    ABG - ( 13 Mar 2018 15:03 )  pH: 7.28  /  pCO2: 36  /  pO2: 94    / HCO3: 24    / Base Excess: -2.9  /  SaO2: 95    / lac 1.8    VENT 450/32/50%/7.5            CARDIAC MARKERS ( 09 Mar 2018 11:03 )  0.58 ng/mL / x     / 280 U/L / x     / 3.4 ng/mL                FLU A B RSV Detection by PCR (03.10.18 @ 21:20)    Flu A Result: Negative: Negative results do not preclude influenza infection and  should not be used as the sole basis for treatment or  other patient management decisions.  A positive result may occur in the absence of viable virus.  By: [employer] Flu viral assay by Reverse Transcriptase  Polymerase Chain Reaction (RT-PCR).    Flu B Result: Negative        MEDICATIONS  (STANDING):  chlorhexidine 0.12% Liquid 15 milliLiter(s) Swish and Spit two times a day  dexmedetomidine Bolus 74 MICROGram(s) IV Bolus once  dexmedetomidine Infusion 0.2 MICROgram(s)/kG/Hr (3.745 mL/Hr) IV Continuous <Continuous>  fentaNYL   Infusion 2 MICROgram(s)/kG/Hr (14.98 mL/Hr) IV Continuous <Continuous>  influenza   Vaccine 0.5 milliLiter(s) IntraMuscular once  levoFLOXacin IVPB 250 milliGRAM(s) IV Intermittent every 48 hours  meropenem  IVPB 500 milliGRAM(s) IV Intermittent every 24 hours  pantoprazole  Injectable 40 milliGRAM(s) IV Push daily    MEDICATIONS  (PRN):  metoclopramide Injectable 5 milliGRAM(s) IV Push every 8 hours PRN increse GI residual      RADIOLOGY & ADDITIONAL STUDIES: unchanged bilateral opacities, ET to be pushed by 1cm, OG is ok

## 2018-03-13 NOTE — CONSULT NOTE ADULT - PROVIDER SPECIALTY LIST ADULT
Cardiology
Heme/Onc
Infectious Disease
Palliative Care
Pulmonology
Surgery
Nephrology
Nutrition Support

## 2018-03-13 NOTE — CONSULT NOTE ADULT - CONSULT REASON
Byron placement
Goals of care and symptoms management
Multiple myeloma not on treatment
Pneumonia
bacteremia, respiratory failure, assess for endocarditis
severe anemia
MADISON and MM
ventilator dependent

## 2018-03-14 NOTE — PROGRESS NOTE ADULT - ASSESSMENT
Multilobar PNA especially on the RUL and also on the left.  CXR no change.  S/P BAL. So far unremarkable.  Urine for legionella antigen pending.  Vanco 1250mg post HD.  Levo 250 mg iv q24h.  Meropenem 750 mg iv q24h

## 2018-03-14 NOTE — PROGRESS NOTE ADULT - ASSESSMENT
67M with MM admitted with acute hypoxic respiratory failure, renal failure, now p/w gastroparesis  -Keep NPO  -NG to low continuous suction  -continue with lactulose and reglan  -restart trickle feeds in 2 days  -no surgical intervention at this time

## 2018-03-14 NOTE — PROGRESS NOTE ADULT - SUBJECTIVE AND OBJECTIVE BOX
Nephrology progress note    Patient is seen and examined, events over the last 24 h noted .    Allergies:  No Known Allergies    Hospital Medications:   MEDICATIONS  (STANDING):  chlorhexidine 0.12% Liquid 15 milliLiter(s) Swish and Spit two times a day  dexmedetomidine Infusion 0.2 MICROgram(s)/kG/Hr (3.745 mL/Hr) IV Continuous <Continuous>  fentaNYL   Infusion 2 MICROgram(s)/kG/Hr (14.98 mL/Hr) IV Continuous <Continuous>  influenza   Vaccine 0.5 milliLiter(s) IntraMuscular once  levoFLOXacin IVPB 250 milliGRAM(s) IV Intermittent every 48 hours  meropenem  IVPB 500 milliGRAM(s) IV Intermittent every 24 hours  micafungin IVPB      micafungin IVPB 100 milliGRAM(s) IV Intermittent every 24 hours  norepinephrine Infusion 0.5 MICROgram(s)/kG/Min (35.109 mL/Hr) IV Continuous <Continuous>  pantoprazole  Injectable 40 milliGRAM(s) IV Push daily  senna 1 Tablet(s) Oral two times a day        VITALS:  T(F): 95.8 (18 @ 08:00), Max: 99.1 (18 @ 04:00)  HR: 88 (18 @ 09:00)  BP: 157/84 (18 @ 09:00)  RR: 32 (18 @ 09:00)  SpO2: 100% (18 @ 07:10)  Wt(kg): --    :  -   @ 07:00  --------------------------------------------------------  IN: 1801.3 mL / OUT: 54 mL / NET: 1747.3 mL     @ 07:  -   @ 07:00  --------------------------------------------------------  IN: 1800.4 mL / OUT: 2076 mL / NET: -275.7 mL     @ 07:01  -   @ 10:49  --------------------------------------------------------  IN: 22.6 mL / OUT: 0 mL / NET: 22.6 mL          PHYSICAL EXAM:  Constitutional: NAD  HEENT: anicteric sclera, oropharynx clear, MMM  Neck: No JVD  Respiratory: CTAB, no wheezes, rales or rhonchi  Cardiovascular: S1, S2, RRR  Gastrointestinal: BS+, soft, NT/ND  Extremities: No cyanosis or clubbing. No peripheral edema  :  No alva.   Skin: No rashes    LABS:      139  |  100  |  145<HH>  ----------------------------<  94  4.8   |  20  |  7.0<HH>    Ca    7.5<L>      14 Mar 2018 05:19  Mg     2.6         TPro  7.1  /  Alb  1.6<L>  /  TBili  1.3<H>  /  DBili      /  AST  34  /  ALT  28  /  AlkPhos  51                            7.9    10.34 )-----------( 54       ( 14 Mar 2018 05:19 )             24.1       Urine Studies:  Urinalysis Basic - ( 12 Mar 2018 00:47 )    Color: Red / Appearance: Turbid / S.025 / pH:   Gluc:  / Ketone: Negative  / Bili: Small / Urobili: 0.2 mg/dL   Blood:  / Protein: 30 mg/dL / Nitrite: Negative   Leuk Esterase: Trace / RBC:  / WBC 6-10 /HPF   Sq Epi:  / Non Sq Epi: Moderate /HPF / Bacteria: Many /HPF        RADIOLOGY & ADDITIONAL STUDIES: Nephrology progress note    Patient is seen and examined, events over the last 24 h noted .  still on mV   discussed with CCU team , will try HD again today     Allergies:  No Known Allergies    Hospital Medications:   MEDICATIONS  (STANDING):  chlorhexidine 0.12% Liquid 15 milliLiter(s) Swish and Spit two times a day  dexmedetomidine Infusion 0.2 MICROgram(s)/kG/Hr (3.745 mL/Hr) IV Continuous <Continuous>  fentaNYL   Infusion 2 MICROgram(s)/kG/Hr (14.98 mL/Hr) IV Continuous <Continuous>  influenza   Vaccine 0.5 milliLiter(s) IntraMuscular once  levoFLOXacin IVPB 250 milliGRAM(s) IV Intermittent every 48 hours  meropenem  IVPB 500 milliGRAM(s) IV Intermittent every 24 hours    micafungin IVPB 100 milliGRAM(s) IV Intermittent every 24 hours  norepinephrine Infusion 0.5 MICROgram(s)/kG/Min (35.109 mL/Hr) IV Continuous <Continuous>  pantoprazole  Injectable 40 milliGRAM(s) IV Push daily  senna 1 Tablet(s) Oral two times a day        VITALS:  T(F): 95.8 (18 @ 08:00), Max: 99.1 (18 @ 04:00)  HR: 88 (18 @ 09:00)  BP: 157/84 (18 @ 09:00)  RR: 32 (18 @ 09:00)  SpO2: 100% (18 @ 07:10)       @ :  -   @ 07:00  --------------------------------------------------------  IN: 1801.3 mL / OUT: 54 mL / NET: 1747.3 mL     @ 07:01  -   @ 07:00  --------------------------------------------------------  IN: 1800.4 mL / OUT: 2076 mL / NET: -275.7 mL     @ 07:01  -   @ 10:49  --------------------------------------------------------  IN: 22.6 mL / OUT: 0 mL / NET: 22.6 mL          PHYSICAL EXAM:  Constitutional: on mV   HEENT: anicteric sclera, oropharynx clear, MMM positive ETT  Neck: No JVD  Respiratory: CTAB, no wheezes, rales or rhonchi  Cardiovascular: S1, S2, RRR  Gastrointestinal: BS+, soft, NT/ND  Extremities: No cyanosis or clubbing. No peripheral edema  :  positive alva.   Skin: No rashes    LABS:      139  |  100  |  145<HH>  ----------------------------<  94  4.8   |  20  |  7.0<HH>  Creatinine Trend: 7.0<--, 9.3<--, 7.4<--, 5.6<--, 7.5<--, 8.9<--  Ca    7.5<L>      14 Mar 2018 05:19  Mg     2.6         TPro  7.1  /  Alb  1.6<L>  /  TBili  1.3<H>  /  DBili      /  AST  34  /  ALT  28  /  AlkPhos  51                            7.9    10.34 )-----------( 54       ( 14 Mar 2018 05:19 )             24.1       Urine Studies:  Urinalysis Basic - ( 12 Mar 2018 00:47 )    Color: Red / Appearance: Turbid / S.025 / pH:   Gluc:  / Ketone: Negative  / Bili: Small / Urobili: 0.2 mg/dL   Blood:  / Protein: 30 mg/dL / Nitrite: Negative   Leuk Esterase: Trace / RBC:  / WBC 6-10 /HPF   Sq Epi:  / Non Sq Epi: Moderate /HPF / Bacteria: Many /HPF        RADIOLOGY & ADDITIONAL STUDIES:  < from: Xray Chest 1 View- PORTABLE-Routine (18 @ 05:29) >  Impression:      Stable bilateral airspace opacities with right mid lung field   consolidation.    Stable support devices.    < end of copied text >

## 2018-03-14 NOTE — PROGRESS NOTE ADULT - ASSESSMENT
indication of cardiology consult is not clear to me, in th request was written assess for endocarditis, Patient has negative blood cultures, partially improved, so will not proceed with AARON.  continue with his management and if there is a real and clear indication for cardiology follow up call again Hypoxia  MADISON  under management for a type of PNA? sepsis? negative blood cultures  at this poing low probability for endocarditis  low platelets, low albumin, high requirement of O2,     I cancelled initially scheduled AARON  recall if ID has high suspicion for endocarditis  continue with current management

## 2018-03-14 NOTE — PROGRESS NOTE ADULT - SUBJECTIVE AND OBJECTIVE BOX
ARLEY COKER  67y, Male      OVERNIGHT EVENTS:    No fevers, sedated, more alert, no pressors, no diarrhea    VITALS:  T(F): 99.1, Max: 99.1 (03-14-18 @ 04:00)  HR: 76  BP: 125/70  RR: 36Vital Signs Last 24 Hrs  T(C): 37.3 (14 Mar 2018 04:00), Max: 37.3 (14 Mar 2018 04:00)  T(F): 99.1 (14 Mar 2018 04:00), Max: 99.1 (14 Mar 2018 04:00)  HR: 76 (14 Mar 2018 06:00) (46 - 80)  BP: 125/70 (14 Mar 2018 06:00) (81/51 - 135/82)  BP(mean): 70 (14 Mar 2018 03:00) (58 - 105)  RR: 36 (14 Mar 2018 06:00) (28 - 36)  SpO2: 99% (14 Mar 2018 05:00) (88% - 100%)    TESTS & MEASUREMENTS:                        8.8    13.11 )-----------( 65       ( 13 Mar 2018 17:19 )             26.3     03-13    139  |  100  |  196<HH>  ----------------------------<  129<H>  5.7<H>   |  18  |  9.3<HH>    Ca    7.4<L>      13 Mar 2018 05:45  Mg     3.1     03-13    TPro  7.1  /  Alb  1.4<L>  /  TBili  1.6<H>  /  DBili  x   /  AST  33  /  ALT  30  /  AlkPhos  58  03-13    LIVER FUNCTIONS - ( 13 Mar 2018 05:45 )  Alb: 1.4 g/dL / Pro: 7.1 g/dL / ALK PHOS: 58 U/L / ALT: 30 U/L / AST: 33 U/L / GGT: x             Culture - Bronchial (collected 03-12-18 @ 17:12)  Source: .Broncial None  Gram Stain (03-13-18 @ 23:00):    Few polymorphonuclear leukocytes per low power field    No squamous epithelial cells per low power field    No organisms seen per oil power field    Culture - Fungal, Body Fluid (collected 03-12-18 @ 17:12)  Source: .Body Fluid Bronchial Washing (not pleural fluid)  Preliminary Report (03-13-18 @ 07:28):    Testing in progress    Culture - Blood (collected 03-12-18 @ 16:29)  Source: .Blood None  Preliminary Report (03-14-18 @ 02:01):    No growth to date.    Culture - Blood (collected 03-12-18 @ 11:14)  Source: .Blood None  Preliminary Report (03-14-18 @ 02:01):    No growth to date.    Culture - Blood (collected 03-12-18 @ 11:14)  Source: .Blood None  Preliminary Report (03-14-18 @ 02:01):    No growth to date.    Culture - Blood (collected 03-12-18 @ 05:39)  Source: .Blood None  Preliminary Report (03-13-18 @ 16:01):    No growth to date.    Culture - Urine (collected 03-12-18 @ 00:46)  Source: .Urine Catheterized  Final Report (03-13-18 @ 18:49):    No growth    Culture - Blood (collected 03-09-18 @ 10:21)  Source: .Blood Blood  Preliminary Report (03-10-18 @ 16:00):    No growth to date.            RADIOLOGY & ADDITIONAL TESTS:    ANTIBIOTICS:  levoFLOXacin IVPB 250 milliGRAM(s) IV Intermittent every 48 hours  meropenem  IVPB 500 milliGRAM(s) IV Intermittent every 24 hours  micafungin IVPB      micafungin IVPB 100 milliGRAM(s) IV Intermittent every 24 hours

## 2018-03-14 NOTE — PROGRESS NOTE ADULT - ASSESSMENT
67 year male patient with history of MM diagnosed in 2011, was on trial medication in [facility] for 3years, then did not seek any medical care thereafter presented for SOB, cough , generalized weakness and decreased po intake since 2 weeks.    #Septic Shock  2/2 PNA/ Acute hypoxic respiratory failure  - Intubated 3/9  - c/w Vanc/Meropenem/Levoquin  - Added Micrafungin per fellow  - Cultures NGTD  - Flu negative, RVP pending  - G1DD on ECHO  - s/p Bronc 3/12    #Multiple Myeloma/Renal failure  - s/p HD x2  - HD today  - Can start pressors if BP drops  - s/p four days of pulse Dexamethasone 40 MG IV per heme onc  - Palliative c/s placed    #Anemia- Multifactorial   - Transfuse <7  - s/p 6U PRBC. 2u today    # Thrombocytopenia/Elevated PT/PTT 2/2 Sepsis vs DIC/ HIT  - d/c HSC, SCD's  - LE doppler negative for DVT   - D-dimer 1490, Fibrinogen 567, not bleeding  - if bleeding  and PT/PTT>1.5 UPN  can administer 10-15ML of FFP as per heme onc  - Monitor PT/PTT and Fibrinogen if bleeding  - May need a dose of Vitamin K oral  1-2.5mg to prevent K deficiency since on antibiotics    DVT ppx- SCD's  Poor prognosis 67 year male patient with history of MM diagnosed in 2011, was on trial medication in [facility] for 3years, then did not seek any medical care thereafter presented for SOB, cough , generalized weakness and decreased po intake since 2 weeks.    #Septic Shock  2/2 PNA/ Acute hypoxic respiratory failure  - Intubated 3/9  - c/w Vanc/Meropenem/Levoquin/Micrafungin per fellow  - Cultures NGTD  - Flu negative  - G1DD on ECHO  - s/p Bronc 3/12  - BAL NGTD  - Urine legionella negative  - Cardiology was consulted per fellow to evaluate for r/o endocarditis as mentioned in ID note. No plan for AARON at this time    #Multiple Myeloma/Renal failure  - s/p HD x3  - HD today  - Can start pressors if BP drops  - s/p four days of pulse Dexamethasone 40 MG IV per heme onc  - Palliative c/s placed. Full code at this time    #Anemia- Multifactorial   - Transfuse <7  - s/p 8U PRBC    # Thrombocytopenia/Elevated PT/PTT 2/2 Sepsis vs DIC/ HIT  - No heparin products  - LE doppler negative for DVT   - D-dimer 1490, Fibrinogen 567, not bleeding  - if bleeding  and PT/PTT>1.5 UPN  can administer 10-15ML of FFP as per heme onc  - Monitor PT/PTT and Fibrinogen if bleeding  - May need a dose of Vitamin K oral  1-2.5mg to prevent K deficiency since on antibiotics  - DIC panel sent    # Not tolerating feeds/ ABd distention  - NPO  - f/u KUB  - Lactulose 30 q4  - Consider adding reglan and erythromycin    DVT ppx- SCD's  Poor prognosis

## 2018-03-14 NOTE — PROGRESS NOTE ADULT - SUBJECTIVE AND OBJECTIVE BOX
Subjective:  today patient's intern informed me there was no bacteremia and all blood cultures resulted negative, they were not sure why they called cardiology consult, He was not sure about the nature of consult and AARON was not requested and was not indicated as well.      MEDICATIONS  (STANDING):  chlorhexidine 0.12% Liquid 15 milliLiter(s) Swish and Spit two times a day  dexmedetomidine Infusion 0.2 MICROgram(s)/kG/Hr (3.745 mL/Hr) IV Continuous <Continuous>  fentaNYL   Infusion 2 MICROgram(s)/kG/Hr (14.98 mL/Hr) IV Continuous <Continuous>  influenza   Vaccine 0.5 milliLiter(s) IntraMuscular once  levoFLOXacin IVPB 250 milliGRAM(s) IV Intermittent every 48 hours  meropenem  IVPB 500 milliGRAM(s) IV Intermittent every 24 hours  micafungin IVPB      micafungin IVPB 100 milliGRAM(s) IV Intermittent every 24 hours  norepinephrine Infusion 0.5 MICROgram(s)/kG/Min (35.109 mL/Hr) IV Continuous <Continuous>  pantoprazole  Injectable 40 milliGRAM(s) IV Push daily  senna 1 Tablet(s) Oral two times a day    MEDICATIONS  (PRN):  metoclopramide Injectable 5 milliGRAM(s) IV Push every 8 hours PRN increse GI residual            Vital Signs Last 24 Hrs  T(C): 35.4 (14 Mar 2018 08:00), Max: 37.3 (14 Mar 2018 04:00)  T(F): 95.8 (14 Mar 2018 08:00), Max: 99.1 (14 Mar 2018 04:00)  HR: 76 (14 Mar 2018 06:00) (46 - 80)  BP: 165/78 (14 Mar 2018 08:00) (81/51 - 165/78)  BP(mean): 70 (14 Mar 2018 03:00) (58 - 105)  RR: 32 (14 Mar 2018 08:00) (28 - 36)  SpO2: 99% (14 Mar 2018 05:00) (88% - 100%)             REVIEW OF SYSTEMS:           PHYSICAL EXAM:  · CONSTITUTIONAL: Looks stable, in no diress  . NECK: Supple, no JVD, no bruit on either carotid side   · RESPIRATORY: Normal air entry to lung base, no wheeze, no crackle, no wet rales  · CARDIOVASCULAR: Normal S1, A2, P2, no murmur, no click, regular rate,  no rub,  · EXTREMITIES: No cyanosis, no clubbing, no edema  · VASCULAR: Pulses are regular, equal, bilateral in upper and lower extremities  	  TELEMETRY:    ECG:    TTE:    LABS:                        7.9    10.34 )-----------( 54       ( 14 Mar 2018 05:19 )             24.1     03-14    139  |  100  |  145<HH>  ----------------------------<  94  4.8   |  20  |  7.0<HH>    Ca    7.5<L>      14 Mar 2018 05:19  Mg     2.6     03-14    TPro  7.1  /  Alb  1.6<L>  /  TBili  1.3<H>  /  DBili  x   /  AST  34  /  ALT  28  /  AlkPhos  51  03-14    CARDIAC MARKERS ( 13 Mar 2018 05:45 )  0.26 ng/mL / x     / 157 U/L / x     / x          PT/INR - ( 14 Mar 2018 05:19 )   PT: 15.60 sec;   INR: 1.43 ratio         PTT - ( 14 Mar 2018 05:19 )  PTT:51.3 sec    I&O's Summary    13 Mar 2018 07:01  -  14 Mar 2018 07:00  --------------------------------------------------------  IN: 1800.4 mL / OUT: 2076 mL / NET: -275.7 mL    14 Mar 2018 07:01  -  14 Mar 2018 09:04  --------------------------------------------------------  IN: 22.6 mL / OUT: 0 mL / NET: 22.6 mL      BNP  RADIOLOGY & ADDITIONAL STUDIES:    IMPRESSION AND PLAN: Subjective:  Initially I scheduled the patient for AARON and d/w the family took consent, but today in a discussion with the  resident taking care of the patient and by reviewing Dr Ray note (he has written, all blood cultures were negative, low possibility of endocarditis), as the result I cancelled AARON. It is not very clear to me why I was called for the consult?       MEDICATIONS  (STANDING):  chlorhexidine 0.12% Liquid 15 milliLiter(s) Swish and Spit two times a day  dexmedetomidine Infusion 0.2 MICROgram(s)/kG/Hr (3.745 mL/Hr) IV Continuous <Continuous>  fentaNYL   Infusion 2 MICROgram(s)/kG/Hr (14.98 mL/Hr) IV Continuous <Continuous>  influenza   Vaccine 0.5 milliLiter(s) IntraMuscular once  levoFLOXacin IVPB 250 milliGRAM(s) IV Intermittent every 48 hours  meropenem  IVPB 500 milliGRAM(s) IV Intermittent every 24 hours  micafungin IVPB      micafungin IVPB 100 milliGRAM(s) IV Intermittent every 24 hours  norepinephrine Infusion 0.5 MICROgram(s)/kG/Min (35.109 mL/Hr) IV Continuous <Continuous>  pantoprazole  Injectable 40 milliGRAM(s) IV Push daily  senna 1 Tablet(s) Oral two times a day    MEDICATIONS  (PRN):  metoclopramide Injectable 5 milliGRAM(s) IV Push every 8 hours PRN increse GI residual            Vital Signs Last 24 Hrs  T(C): 35.4 (14 Mar 2018 08:00), Max: 37.3 (14 Mar 2018 04:00)  T(F): 95.8 (14 Mar 2018 08:00), Max: 99.1 (14 Mar 2018 04:00)  HR: 76 (14 Mar 2018 06:00) (46 - 80)  BP: 165/78 (14 Mar 2018 08:00) (81/51 - 165/78)  BP(mean): 70 (14 Mar 2018 03:00) (58 - 105)  RR: 32 (14 Mar 2018 08:00) (28 - 36)  SpO2: 99% (14 Mar 2018 05:00) (88% - 100%)             REVIEW OF SYSTEMS:  still intubated, sedated, thrombocytopenic,   no major change in his medical condition,   off pressor support, just used for HD  required higher O2 supply now on 70% fio2               PHYSICAL EXAM:  · CONSTITUTIONAL:  sedated intubated but responds to stimulation, moves, occasionally opens eyes  no distress, on 70% fio2  si a2, p2, no gallop, no s3  b/l coarse lung sounds, no wheeze, no crackles  soft abd, bs normal and audible  leg edema: none  	  TELEMETRY: NSR    ECG:    TTE:    LABS:                        7.9    10.34 )-----------( 54       ( 14 Mar 2018 05:19 )             24.1     03-14    139  |  100  |  145<HH>  ----------------------------<  94  4.8   |  20  |  7.0<HH>    Ca    7.5<L>      14 Mar 2018 05:19  Mg     2.6     03-14    TPro  7.1  /  Alb  1.6<L>  /  TBili  1.3<H>  /  DBili  x   /  AST  34  /  ALT  28  /  AlkPhos  51  03-14    CARDIAC MARKERS ( 13 Mar 2018 05:45 )  0.26 ng/mL / x     / 157 U/L / x     / x          PT/INR - ( 14 Mar 2018 05:19 )   PT: 15.60 sec;   INR: 1.43 ratio         PTT - ( 14 Mar 2018 05:19 )  PTT:51.3 sec    I&O's Summary    13 Mar 2018 07:01  -  14 Mar 2018 07:00  --------------------------------------------------------  IN: 1800.4 mL / OUT: 2076 mL / NET: -275.7 mL    14 Mar 2018 07:01  -  14 Mar 2018 09:04  --------------------------------------------------------  IN: 22.6 mL / OUT: 0 mL / NET: 22.6 mL      BNP  RADIOLOGY & ADDITIONAL STUDIES:    IMPRESSION AND PLAN:

## 2018-03-14 NOTE — PROGRESS NOTE ADULT - SUBJECTIVE AND OBJECTIVE BOX
PGY I NOTE    LENGTH OF HOSPITAL STAY:  5d    Pt seen and examined at bedside. Sedation vacation-followed commands, moved all extremities. HD today    CHIEF COMPLAINT:Patient is a 67y old  Male who presents with a chief complaint of sob, weakness (09 Mar 2018 05:52)    HPI:HPI:  68 y/o M PMH Multiple Myeloma, Anemia who presents with weakness, SOB.  Patient had a dry cough and felt like he was sick for the past 2 weeks and this worsened over the past 2 days.  Patient is unable to walk without being extremely dyspneic , dyspnea gradually progressed to be at rest. Patient had chills in the last 2 days Patient is weak and not eating and not drinking well. Patient also has bone pain in his right flank.  Patient has not seen a doctor in numerous years and is not on medication.  Family felt he looked pale and weak. Patient denies any hematuria, melena or hematochezia. Patient is on iron tablets since 5 years, since then his stool is dark, with no recent change. Patient denies any alteration in bowl movements no nausea or vomiting, but there is decrease in PO intake. In the ER patient received one unit of blood , when he was received the second unit, he spiked a tem of 38.5, no change in hemodynamics, work up for transfusion reaction is sent. Patient was not taking medication or following up with any doctor cause he believes in herbal medicine only. (09 Mar 2018 05:52)    OVERNIGHT EVENTS/INTERVAL UPDATES:    PMH & PSH  PAST MEDICAL & SURGICAL HISTORY:  Anemia, unspecified type  Multiple myeloma, remission status unspecified    SOCIAL HISTORY: Negative    ALLERGIES: No Known Allergies    HOME MEDICATIONS  Home Medications:    PHYSICAL EXAM:  T(F): 95.8 (03-14-18 @ 08:00), Max: 99.1 (03-14-18 @ 04:00)  HR: 77 (03-14-18 @ 11:30)  BP: 136/73 (03-14-18 @ 11:30)  RR: 32 (03-14-18 @ 11:30)  SpO2: 100% (03-14-18 @ 11:30)  CAPILLARY BLOOD GLUCOSE          03-13-18 @ 07:01  -  03-14-18 @ 07:00  --------------------------------------------------------  IN:    dexmedetomidine Infusion: 151.9 mL    Enteral Tube Flush: 90 mL    fentaNYL  Infusion: 359 mL    IV PiggyBack: 400 mL    midazolam Infusion: 4.5 mL    norepinephrine Infusion: 10 mL    Packed Red Blood Cells: 500 mL    Vital High Protein: 285 mL  Total IN: 1800.4 mL    OUT:    Indwelling Catheter - Urethral: 76 mL    Other: 2000 mL  Total OUT: 2076 mL    Total NET: -275.7 mL      03-14-18 @ 07:01  -  03-14-18 @ 11:45  --------------------------------------------------------  IN:    dexmedetomidine Infusion: 43.2 mL    fentaNYL  Infusion: 15 mL  Total IN: 58.2 mL    OUT:    Indwelling Catheter - Urethral: 50 mL  Total OUT: 50 mL    Total NET: 8.2 mL        Mode: AC/ CMV (Assist Control/ Continuous Mandatory Ventilation), RR (machine): 32, TV (machine): 450, FiO2: 70, PEEP: 7.5, ITime: 1, MAP: 14, PIP: 28    General: Intubated  HEENT: NCAT  CV: RRR  RESP: B/L Rhonchi, exp wheezing  Abdominal: Mildly distended  Extremity: no c/c/e  Neuro: A&A    MEDICATIONS  STANDING MEDICATIONS  chlorhexidine 0.12% Liquid 15 milliLiter(s) Swish and Spit two times a day  dexmedetomidine Infusion 0.2 MICROgram(s)/kG/Hr IV Continuous <Continuous>  fentaNYL   Infusion 2 MICROgram(s)/kG/Hr IV Continuous <Continuous>  influenza   Vaccine 0.5 milliLiter(s) IntraMuscular once  levoFLOXacin IVPB 250 milliGRAM(s) IV Intermittent every 48 hours  meropenem  IVPB 500 milliGRAM(s) IV Intermittent every 24 hours  micafungin IVPB      micafungin IVPB 100 milliGRAM(s) IV Intermittent every 24 hours  norepinephrine Infusion 0.5 MICROgram(s)/kG/Min IV Continuous <Continuous>  pantoprazole  Injectable 40 milliGRAM(s) IV Push daily  senna 1 Tablet(s) Oral two times a day    PRN MEDICATIONS  metoclopramide Injectable 5 milliGRAM(s) IV Push every 8 hours PRN    LABS:                        7.9    10.34 )-----------( 54       ( 14 Mar 2018 05:19 )             24.1              03-14    139  |  100  |  145<HH>  ----------------------------<  94  4.8   |  20  |  7.0<HH>    Ca    7.5<L>      14 Mar 2018 05:19  Mg     2.6     03-14    TPro  7.1  /  Alb  1.6<L>  /  TBili  1.3<H>  /  DBili  x   /  AST  34  /  ALT  28  /  AlkPhos  51  03-14    LIVER FUNCTIONS - ( 14 Mar 2018 05:19 )  Alb: 1.6 g/dL / Pro: 7.1 g/dL / ALK PHOS: 51 U/L / ALT: 28 U/L / AST: 34 U/L / GGT: x                      PT/INR - ( 14 Mar 2018 05:19 )   PT: 15.60 sec;   INR: 1.43 ratio         PTT - ( 14 Mar 2018 05:19 )  PTT:51.3 sec  CARDIAC MARKERS ( 13 Mar 2018 05:45 )  0.26 ng/mL / x     / 157 U/L / x     / x                      ABG - ( 14 Mar 2018 07:28 )  pH: 7.33  /  pCO2: 39    /  pO2: 96    / HCO3: 20    / Base Excess: -5.0  /  SaO2: 98                    Culture - Bronchial (collected 12 Mar 2018 17:12)  Source: .Broncial None  Gram Stain (13 Mar 2018 23:00):    Few polymorphonuclear leukocytes per low power field    No squamous epithelial cells per low power field    No organisms seen per oil power field    Culture - Fungal, Body Fluid (collected 12 Mar 2018 17:12)  Source: .Body Fluid Bronchial Washing (not pleural fluid)  Preliminary Report (13 Mar 2018 07:28):    Testing in progress    Culture - Blood (collected 12 Mar 2018 16:29)  Source: .Blood None  Preliminary Report (14 Mar 2018 02:01):    No growth to date.    Culture - Blood (collected 12 Mar 2018 11:14)  Source: .Blood None  Preliminary Report (14 Mar 2018 02:01):    No growth to date.    Culture - Blood (collected 12 Mar 2018 11:14)  Source: .Blood None  Preliminary Report (14 Mar 2018 02:01):    No growth to date.    Culture - Blood (collected 12 Mar 2018 05:39)  Source: .Blood None  Preliminary Report (13 Mar 2018 16:01):    No growth to date.    Culture - Urine (collected 12 Mar 2018 00:46)  Source: .Urine Catheterized  Final Report (13 Mar 2018 18:49):    No growth PGY I NOTE    LENGTH OF HOSPITAL STAY:  5d    Pt seen and examined at bedside. Overnight pt desat to 88% and 02 was increased to 70%. Vitals were stable. Today, Sedation vacation-followed commands, moved all extremities. HD today. Not tolerating feeds as per nursing with large residual    CHIEF COMPLAINT:Patient is a 67y old  Male who presents with a chief complaint of sob, weakness (09 Mar 2018 05:52)    HPI:HPI:  66 y/o M PMH Multiple Myeloma, Anemia who presents with weakness, SOB.  Patient had a dry cough and felt like he was sick for the past 2 weeks and this worsened over the past 2 days.  Patient is unable to walk without being extremely dyspneic , dyspnea gradually progressed to be at rest. Patient had chills in the last 2 days Patient is weak and not eating and not drinking well. Patient also has bone pain in his right flank.  Patient has not seen a doctor in numerous years and is not on medication.  Family felt he looked pale and weak. Patient denies any hematuria, melena or hematochezia. Patient is on iron tablets since 5 years, since then his stool is dark, with no recent change. Patient denies any alteration in bowl movements no nausea or vomiting, but there is decrease in PO intake. In the ER patient received one unit of blood , when he was received the second unit, he spiked a tem of 38.5, no change in hemodynamics, work up for transfusion reaction is sent. Patient was not taking medication or following up with any doctor cause he believes in herbal medicine only. (09 Mar 2018 05:52)    OVERNIGHT EVENTS/INTERVAL UPDATES:    PMH & PSH  PAST MEDICAL & SURGICAL HISTORY:  Anemia, unspecified type  Multiple myeloma, remission status unspecified    SOCIAL HISTORY: Negative    ALLERGIES: No Known Allergies    HOME MEDICATIONS  Home Medications:    PHYSICAL EXAM:  T(F): 95.8 (03-14-18 @ 08:00), Max: 99.1 (03-14-18 @ 04:00)  HR: 77 (03-14-18 @ 11:30)  BP: 136/73 (03-14-18 @ 11:30)  RR: 32 (03-14-18 @ 11:30)  SpO2: 100% (03-14-18 @ 11:30)  CAPILLARY BLOOD GLUCOSE          03-13-18 @ 07:01  -  03-14-18 @ 07:00  --------------------------------------------------------  IN:    dexmedetomidine Infusion: 151.9 mL    Enteral Tube Flush: 90 mL    fentaNYL  Infusion: 359 mL    IV PiggyBack: 400 mL    midazolam Infusion: 4.5 mL    norepinephrine Infusion: 10 mL    Packed Red Blood Cells: 500 mL    Vital High Protein: 285 mL  Total IN: 1800.4 mL    OUT:    Indwelling Catheter - Urethral: 76 mL    Other: 2000 mL  Total OUT: 2076 mL    Total NET: -275.7 mL      03-14-18 @ 07:01  -  03-14-18 @ 11:45  --------------------------------------------------------  IN:    dexmedetomidine Infusion: 43.2 mL    fentaNYL  Infusion: 15 mL  Total IN: 58.2 mL    OUT:    Indwelling Catheter - Urethral: 50 mL  Total OUT: 50 mL    Total NET: 8.2 mL        Mode: AC/ CMV (Assist Control/ Continuous Mandatory Ventilation), RR (machine): 32, TV (machine): 450, FiO2: 70, PEEP: 7.5, ITime: 1, MAP: 14, PIP: 28    General: Intubated  HEENT: NCAT  CV: RRR  RESP: B/L Rhonchi, exp wheezing  Abdominal: Mildly distended  Extremity: no c/c/e  Neuro: A&A    MEDICATIONS  STANDING MEDICATIONS  chlorhexidine 0.12% Liquid 15 milliLiter(s) Swish and Spit two times a day  dexmedetomidine Infusion 0.2 MICROgram(s)/kG/Hr IV Continuous <Continuous>  fentaNYL   Infusion 2 MICROgram(s)/kG/Hr IV Continuous <Continuous>  influenza   Vaccine 0.5 milliLiter(s) IntraMuscular once  levoFLOXacin IVPB 250 milliGRAM(s) IV Intermittent every 48 hours  meropenem  IVPB 500 milliGRAM(s) IV Intermittent every 24 hours  micafungin IVPB      micafungin IVPB 100 milliGRAM(s) IV Intermittent every 24 hours  norepinephrine Infusion 0.5 MICROgram(s)/kG/Min IV Continuous <Continuous>  pantoprazole  Injectable 40 milliGRAM(s) IV Push daily  senna 1 Tablet(s) Oral two times a day    PRN MEDICATIONS  metoclopramide Injectable 5 milliGRAM(s) IV Push every 8 hours PRN    LABS:                        7.9    10.34 )-----------( 54       ( 14 Mar 2018 05:19 )             24.1              03-14    139  |  100  |  145<HH>  ----------------------------<  94  4.8   |  20  |  7.0<HH>    Ca    7.5<L>      14 Mar 2018 05:19  Mg     2.6     03-14    TPro  7.1  /  Alb  1.6<L>  /  TBili  1.3<H>  /  DBili  x   /  AST  34  /  ALT  28  /  AlkPhos  51  03-14    LIVER FUNCTIONS - ( 14 Mar 2018 05:19 )  Alb: 1.6 g/dL / Pro: 7.1 g/dL / ALK PHOS: 51 U/L / ALT: 28 U/L / AST: 34 U/L / GGT: x                      PT/INR - ( 14 Mar 2018 05:19 )   PT: 15.60 sec;   INR: 1.43 ratio         PTT - ( 14 Mar 2018 05:19 )  PTT:51.3 sec  CARDIAC MARKERS ( 13 Mar 2018 05:45 )  0.26 ng/mL / x     / 157 U/L / x     / x                      ABG - ( 14 Mar 2018 07:28 )  pH: 7.33  /  pCO2: 39    /  pO2: 96    / HCO3: 20    / Base Excess: -5.0  /  SaO2: 98                    Culture - Bronchial (collected 12 Mar 2018 17:12)  Source: .Broncial None  Gram Stain (13 Mar 2018 23:00):    Few polymorphonuclear leukocytes per low power field    No squamous epithelial cells per low power field    No organisms seen per oil power field    Culture - Fungal, Body Fluid (collected 12 Mar 2018 17:12)  Source: .Body Fluid Bronchial Washing (not pleural fluid)  Preliminary Report (13 Mar 2018 07:28):    Testing in progress    Culture - Blood (collected 12 Mar 2018 16:29)  Source: .Blood None  Preliminary Report (14 Mar 2018 02:01):    No growth to date.    Culture - Blood (collected 12 Mar 2018 11:14)  Source: .Blood None  Preliminary Report (14 Mar 2018 02:01):    No growth to date.    Culture - Blood (collected 12 Mar 2018 11:14)  Source: .Blood None  Preliminary Report (14 Mar 2018 02:01):    No growth to date.    Culture - Blood (collected 12 Mar 2018 05:39)  Source: .Blood None  Preliminary Report (13 Mar 2018 16:01):    No growth to date.    Culture - Urine (collected 12 Mar 2018 00:46)  Source: .Urine Catheterized  Final Report (13 Mar 2018 18:49):    No growth

## 2018-03-14 NOTE — PROGRESS NOTE ADULT - ASSESSMENT
[place]/MM/sepsis/respiratory failure/mixed metabolic and respiratory acidosis/anemia/hyperkalemia /hypercalcemia     # ?ATN in nature    # anuric   #off pressors   # calcium improving  # HD today, standard bath, ur 2 liters as tolerated   # s/p bronchoscopy  #ID/hematology notes appreciated ? to start chemo   # tx 2 units of PRBC   # overall prognosis poor   # will follow [place]/MM/sepsis/respiratory failure/mixed metabolic and respiratory acidosis/anemia/hyperkalemia /hypercalcemia     # ?ATN in nature    # anuric   #back on pressors  # calcium back to nl levels   # HD today, standard bath, ur 2 liters as tolerated / requested by CCU team   # s/p bronchoscopy  # Hb noted sp transfusion   #K noted better   # overall prognosis poor   # will follow

## 2018-03-14 NOTE — PROGRESS NOTE ADULT - ASSESSMENT
Assessment and Plan:   		  AHRF/ SEVERE PNA/ ADVANCED MM RENAL FAILURE S/P HD, SEVERE ANEMIA NO ACTIVE BLEED, agitated off sedation    CNS: Sedation vacation, keep sedated    PULM: No vent changes for now    CARDIO: I<O    RENAL: Renal fup, HD today with 2-3 L uf  levophed as needed    HEM ONC: worsening platelts count, no intervention as per Hem onc  send DIC panel  treat as DIC IF bleeding    GI: NPO for now  KUB, if no obstruction, reglan and erythromycin  Lactulose 30ml via NG Q4 hr      ID: Cont abx and add Micafungin f/up wash result, ID RECOMMENDATION      Palliative care cosnult    follow up bronchoscopy cultures  POOR PROGNOSIS Assessment and Plan:   		  AHRF/ SEVERE PNA/ ADVANCED MM RENAL FAILURE S/P HD, SEVERE ANEMIA NO ACTIVE BLEED, agitated off sedation advanced mm    CNS: Sedation vacation, keep sedated    PULM: No vent changes for now, increase peep if needed    CARDIO: I<O    RENAL: Renal fup, HD today with 2-3 L uf  levophed as needed    HEM ONC: worsening platelets count, no intervention as per Hem onc  send DIC panel  treat as DIC IF bleeding    GI: NPO for now  KUB, if no obstruction, reglan and erythromycin  Lactulose 30ml via NG Q4 hr      ID: Cont abx and add Micafungin f/up wash result, ID RECOMMENDATION      Palliative care consult    follow up bronchoscopy cultures  POOR PROGNOSIS, family aware

## 2018-03-14 NOTE — PROGRESS NOTE ADULT - ASSESSMENT
68 y/o M PMH Multiple Myeloma ( was  being treated with holistics approach, Anemia who presented  with weakness/ SOB for past 2 weeks , worsened over the past 2 days, admitted with diagnosis of sepsis due to multifocal pneumonia/ acute hypoxemic respiratory failure requiring intubation.    #Septic Shock  2/2 PNA/ Acute hypoxic respiratory failure  - respiratory status worsening  -antibiotics as per team  --pressors as per team    #History of Multiple Myeloma now in Severe Renal failure/ Metabolic acidosis, the renal failure maybe multifactorial, sepsis and myeloma  -S/P 4 days  of pulse Dexamethasone 40 MG IV daily now Day 6  -CTs did show lytic lesions < from: CT Chest No Cont (03.09.18 @ 00:54) >  Osseous lucent lesions and overall demineralization compatible patient's  known multiple myeloma.  -SPEP Mspike was 2.9 Immunoglobulin Free Light Chains, Serum (03.09.18 @ 17:13)    [place]/Lambda Free Light Chain Ratio, Serum: 777.50 Ratio    NANI Kappa: 311.00 mg/dL    NANI Lambda: 0.40 mg/dL  -will consider Velcade and or Cytoxan if recovers,  did not want conventional treatment in past.    #Anemia/ multifactorial   Multiple myeloma/acute illness with bone marrow suppression,  anemia in CKD, and possible GI bleed  keep hb>7. transfusions  as needed   Ferritin 1559, sat 21, Folate 13.9  -    #lEvated pt/PTT possibly from DIC due to sepsis  with falling Fibrinogen  -no active gross bleeding a this time may have had a GI Bleed black secretions  -if bleeding  and PT/PTT>1.5 UPN  can administer 10-15ML of FFP and if Fibrinogen is less than 100 can give Cryoprecipitate as well.  -monitor PT/PTT and Fibrinogen if bleeding  -theodore  dose of Vitamin K  IV  1-2.5mg to prevent K deficiency since on antibiotics once    #Mild Hypercalcemia calcium resolved corrected calcium is now 9.4  -this is multifactorial and maybe due to Myeloma or  Renal failure and is decreasing  -responding to Steroids for MM   and or HD  -no Bisphosphonates for now due to renal failure      #Elevated Bili and AST  -maybe due to organ dysfunction, liver from sepsis and or mild hemolysis from transfused PRBC   -monitor LFTS    #New thrombocytopenia  now moderate this is multifactorial  - Hep SC stopped   and this  started about  day 5. I doubt HIT the drop is not 50% and he has multiple other causes, infection, DIC,  medications thus isend a HIT serology panel but would not start DTI. His Dopplers 3/13 negative and he maybe bleeding  -keep plts >50,000 if bleeding or >10,000 if not. If overt DIC and bleeding may need a platelet count of >90,000    #Black OG secretions due to GI bleed? Medications? Old feeds?  and illius   -has been on PPI, monitor CBC      Prognosis is poor. Family is aware.

## 2018-03-14 NOTE — PROGRESS NOTE ADULT - SUBJECTIVE AND OBJECTIVE BOX
[facility] [address]  ARLEY COKER  67yMale  [id number]    SURGICAL INTERN PROGRESS NOTE  HPI: 66 y/o M PMH Multiple Myeloma admitted with respiratory failure s/p intubation, renal failure s/p udall placement, recalled surgery for gastroparesis     ADMISSION DIAGNOSIS:HEALTH ISSUES - PROBLEM Dx:  Thrombocytopenia: Thrombocytopenia  Pneumonia due to infectious organism, unspecified laterality, unspecified part of lung: Pneumonia due to infectious organism, unspecified laterality, unspecified part of lung  Acute renal failure, unspecified acute renal failure type: Acute renal failure, unspecified acute renal failure type  Anemia, unspecified type: Anemia, unspecified type  Multiple myeloma, remission status unspecified:    HOSPITAL DAY: 7  VITALS: Vital Signs Last 24 Hrs  T(C): 37.7 (14 Mar 2018 15:00), Max: 37.7 (14 Mar 2018 15:00)  T(F): 99.9 (14 Mar 2018 15:00), Max: 99.9 (14 Mar 2018 15:00)  HR: 73 (14 Mar 2018 17:00) (50 - 90)  BP: 146/90 (14 Mar 2018 16:00) (85/56 - 166/78)  BP(mean): 70 (14 Mar 2018 03:00) (61 - 105)  RR: 32 (14 Mar 2018 17:00) (28 - 36)  SpO2: 100% (14 Mar 2018 17:00) (88% - 100%)    PHYSICAL EXAM: middle-aged male, intubated, sedated.     MEDICATIONS: MEDICATIONS  (STANDING):  chlorhexidine 0.12% Liquid 15 milliLiter(s) Swish and Spit two times a day  dexmedetomidine Infusion 0.2 MICROgram(s)/kG/Hr (3.745 mL/Hr) IV Continuous <Continuous>  fentaNYL   Infusion 2 MICROgram(s)/kG/Hr (14.98 mL/Hr) IV Continuous <Continuous>  influenza   Vaccine 0.5 milliLiter(s) IntraMuscular once  levoFLOXacin IVPB 250 milliGRAM(s) IV Intermittent every 48 hours  meropenem  IVPB 500 milliGRAM(s) IV Intermittent every 24 hours  micafungin IVPB      micafungin IVPB 100 milliGRAM(s) IV Intermittent every 24 hours  norepinephrine Infusion 0.5 MICROgram(s)/kG/Min (35.109 mL/Hr) IV Continuous <Continuous>  pantoprazole  Injectable 40 milliGRAM(s) IV Push daily  senna 1 Tablet(s) Oral two times a day    MEDICATIONS  (PRN):  metoclopramide Injectable 5 milliGRAM(s) IV Push every 8 hours PRN increse GI residual      LABS:                        7.9    10.34 )-----------( 54       ( 14 Mar 2018 05:19 )             24.1     03-14    139  |  100  |  145<HH>  ----------------------------<  94  4.8   |  20  |  7.0<HH>    Ca    7.5<L>      14 Mar 2018 05:19  Mg     2.6     03-14    TPro  7.1  /  Alb  1.6<L>  /  TBili  1.3<H>  /  DBili  x   /  AST  34  /  ALT  28  /  AlkPhos  51  03-14    PT/INR - ( 14 Mar 2018 11:28 )   PT: 14.90 sec;   INR: 1.37 ratio         PTT - ( 14 Mar 2018 11:28 )  PTT:44.6 sec  LIVER FUNCTIONS - ( 14 Mar 2018 05:19 )  Alb: 1.6 g/dL / Pro: 7.1 g/dL / ALK PHOS: 51 U/L / ALT: 28 U/L / AST: 34 U/L / GGT: x               Culture - Blood (collected 13 Mar 2018 05:45)  Source: .Blood None  Preliminary Report (14 Mar 2018 12:13):    No growth to date.    Culture - Bronchial (collected 12 Mar 2018 17:12)  Source: .Broncial None  Gram Stain (13 Mar 2018 23:00):    Few polymorphonuclear leukocytes per low power field    No squamous epithelial cells per low power field    No organisms seen per oil power field    Culture - Fungal, Body Fluid (collected 12 Mar 2018 17:12)  Source: .Body Fluid Bronchial Washing (not pleural fluid)  Preliminary Report (13 Mar 2018 07:28):    Testing in progress    Culture - Blood (collected 12 Mar 2018 16:29)  Source: .Blood None  Preliminary Report (14 Mar 2018 02:01):    No growth to date.    Culture - Blood (collected 12 Mar 2018 11:14)  Source: .Blood None  Preliminary Report (14 Mar 2018 02:01):    No growth to date.    Culture - Blood (collected 12 Mar 2018 11:14)  Source: .Blood None  Preliminary Report (14 Mar 2018 02:01):    No growth to date.    Culture - Blood (collected 12 Mar 2018 05:39)  Source: .Blood None  Preliminary Report (13 Mar 2018 16:01):    No growth to date.    Culture - Urine (collected 12 Mar 2018 00:46)  Source: .Urine Catheterized  Final Report (13 Mar 2018 18:49):    No growth      ABG - ( 14 Mar 2018 07:28 )  pH: 7.33  /  pCO2: 39    /  pO2: 96    / HCO3: 20    / Base Excess: -5.0  /  SaO2: 98                CARDIAC MARKERS ( 13 Mar 2018 05:45 )  0.26 ng/mL / x     / 157 U/L / x     / x            RADIOLOGY & ADDITIONAL STUDIES:

## 2018-03-14 NOTE — PROGRESS NOTE ADULT - SUBJECTIVE AND OBJECTIVE BOX
I saw and examined the patient around 2:20 pm was with RN. She was suctioning black gastric secretions from  OG tube. He was found to have an illiues. His respiratory setting has worsen with higher FIO2. He was still breathing  at 32. He has not had a bowel movement. She states his OG suctioning was bilious yesterday He was  not tolerating feeds.  He has very mild blood tinged secretions in ET suctioning.        Vital Signs Last 24 Hrs  T(C): 37.7 (14 Mar 2018 15:00), Max: 37.7 (14 Mar 2018 15:00)  T(F): 99.9 (14 Mar 2018 15:00), Max: 99.9 (14 Mar 2018 15:00)  HR: 63 (14 Mar 2018 18:00) (56 - 90)  BP: 102/55 (14 Mar 2018 18:00) (85/56 - 166/78)  BP(mean): 70 (14 Mar 2018 03:00) (61 - 105)  RR: 32 (14 Mar 2018 18:00) (28 - 36)  SpO2: 100% (14 Mar 2018 18:00) (88% - 100%)    Allergies:No Known Allergies  Intolerances  MEDICATIONS  (STANDING):  chlorhexidine 0.12% Liquid 15 milliLiter(s) Swish and Spit two times a day  dexmedetomidine Infusion 0.2 MICROgram(s)/kG/Hr (3.745 mL/Hr) IV Continuous <Continuous>  fentaNYL   Infusion 2 MICROgram(s)/kG/Hr (14.98 mL/Hr) IV Continuous <Continuous>  influenza   Vaccine 0.5 milliLiter(s) IntraMuscular once  lactulose Syrup 30 Gram(s) Oral every 4 hours  levoFLOXacin IVPB 250 milliGRAM(s) IV Intermittent every 48 hours  meropenem  IVPB 500 milliGRAM(s) IV Intermittent every 24 hours  micafungin IVPB      micafungin IVPB 100 milliGRAM(s) IV Intermittent every 24 hours  norepinephrine Infusion 0.5 MICROgram(s)/kG/Min (35.109 mL/Hr) IV Continuous <Continuous>  pantoprazole  Injectable 40 milliGRAM(s) IV Push daily  senna 1 Tablet(s) Oral two times a day    MEDICATIONS  (PRN):  metoclopramide Injectable 5 milliGRAM(s) IV Push every 8 hours PRN increse GI residual      CBC Full  -  ( 14 Mar 2018 05:19 )  WBC Count : 10.34 K/uL  Hemoglobin : 7.9 g/dL  Hematocrit : 24.1 %  Platelet Count - Automated : 54 K/uL  Mean Cell Volume : 88.0 fL  Mean Cell Hemoglobin : 28.8 pg  Mean Cell Hemoglobin Concentration : 32.8 g/dL  Auto Neutrophil # : 9.09 K/uL  Auto Lymphocyte # : 0.45 K/uL  Auto Monocyte # : 0.30 K/uL  Auto Eosinophil # : 0.00 K/uL  Auto Basophil # : 0.01 K/uL  Auto Neutrophil % : 87.9 %  Auto Lymphocyte % : 4.4 %  Auto Monocyte % : 2.9 %  Auto Eosinophil % : 0.0 %  Auto Basophil % : 0.1 %  PT/INR - ( 14 Mar 2018 11:28 )   PT: 14.90 sec;   INR: 1.37 ratio         PTT - ( 14 Mar 2018 11:28 )  PTT:44.6 vcy05-60 @ 11:28  6189 ng/mL DDU<H> [0 - 230]               7.9    10.34 )-----------( 54       ( 03-14 @ 05:19 )             24.1                8.8    13.11 )-----------( 65       ( 03-13 @ 17:19 )             26.3                5.3    13.62 )-----------( 76       ( 03-13 @ 05:45 )             16.2                7.0    16.89 )-----------( 94       ( 03-12 @ 05:39 )             21.4                6.9    23.00 )-----------( 132      ( 03-11 @ 05:16 )             21.4                7.3    20.50 )-----------( 136      ( 03-10 @ 20:02 )             22.9                6.2    16.63 )-----------( 128      ( 03-10 @ 06:18 )             20.1                6.4    12.14 )-----------( 146      ( 03-09 @ 20:31 )             20.6       03-14    139  |  100  |  145<HH>  ----------------------------<  94  4.8   |  20  |  7.0<HH>    Ca    7.5<L>      14 Mar 2018 05:19  Mg     2.6     03-14    TPro  7.1  /  Alb  1.6<L>  /  TBili  1.3<H>  /  DBili  x   /  AST  34  /  ALT  28  /  AlkPhos  51  03-14  LIVER FUNCTIONS - ( 14 Mar 2018 05:19 )  Alb: 1.6 g/dL / Pro: 7.1 g/dL / ALK PHOS: 51 U/L / ALT: 28 U/L / AST: 34 U/L / GGT: x             Culture - Blood (collected 13 Mar 2018 05:45)  Source: .Blood None  Preliminary Report (14 Mar 2018 12:13):    No growth to date.    Culture - Bronchial (collected 12 Mar 2018 17:12)  Source: .Broncial None  Gram Stain (13 Mar 2018 23:00):    Few polymorphonuclear leukocytes per low power field    No squamous epithelial cells per low power field    No organisms seen per oil power field  Preliminary Report (14 Mar 2018 18:48):    No growth to date.    Culture - Fungal, Body Fluid (collected 12 Mar 2018 17:12)  Source: .Body Fluid Bronchial Washing (not pleural fluid)  Preliminary Report (13 Mar 2018 07:28):    Testing in progress    Culture - Blood (collected 12 Mar 2018 16:29)  Source: .Blood None  Preliminary Report (14 Mar 2018 02:01):    No growth to date.    Culture - Blood (collected 12 Mar 2018 11:14)  Source: .Blood None  Preliminary Report (14 Mar 2018 02:01):    No growth to date.    Culture - Blood (collected 12 Mar 2018 11:14)  Source: .Blood None  Preliminary Report (14 Mar 2018 02:01):    No growth to date.    Culture - Blood (collected 12 Mar 2018 05:39)  Source: .Blood None  Preliminary Report (13 Mar 2018 16:01):    No growth to date.    Culture - Urine (collected 12 Mar 2018 00:46)  Source: .Urine Catheterized  Final Report (13 Mar 2018 18:49):    No growth        Radiology:     < from: Xray Chest 1 View-PORTABLE IMMEDIATE (03.14.18 @ 15:45) >  IMPRESSION:     Unchanged lungs.    Support devices as above.      < end of copied text >  < from: Xray Kidney Ureter Bladder (03.14.18 @ 15:38) >  Marked gaseous distention of the stomach which may represent an element   of gastroparesis.    No evidence of a bowel obstruction.      < end of copied text >    ADDITIONAL STUDIES:      Skeletal survey (2011) Impression: Two lucencies within the skull, may represent a venous lake or benign process, correlate clinically with laboratory values ? No other lytic lesions identified ? C5-6 C6-7 degenerative disk disease cervical spine ? Mild bilateral knee degenerative changes     Textual Result SEE TEXT (2011)   RESULTS FOR PATIENT - #[id number]  ARLEY COKER  [GENO/LA LT CHAIN]  [id number] - [id number]  IRASEMA: 07/27/11 1733  RECV: 07/27/11 1927       FREE KAPPA SERU                24.3   H    mg/L          (3.3-19.4)       FREE LAMBDA SER                 2.0   L    mg/L          (5.7-26.3)       FREE K/L RATIO                12.15   H                 (0.26-1.65)    Result Name Results Units Reference Range Textual Result SEE TEXT   [id number] - [id number]  IRASEMA: 07/27/11 1733  RECV: 07/27/11 1927       IgA                            2944   H    mg/dL           ()    IMMUNOFIX SERUM (()) IgA-Kappa monoclonal protein present. Test Performed at: [facility] [place] [address] unless otherwise noted.      Bone marrow biopsy: 8/2011: Palmacytosis with increased kappa light chains.          REVIEW OF SYSTEMS:    Unable to obtain this he is intubated and sedated. See above from RN      PYSICAL EXAM:  GENERAL: Sedated, ET, OT and right TLC in place  HEAD:  Atraumatic, Normocephalic  EYES:  conjunctiva and sclera clear  CHEST/LUNG No wheeze, transmitted ET tube sounds and rhonchi in bases   HEART: Regular rate and rhythm; No murmurs, rubs, or gallops  ABDOMEN: His abdomen was distended and the bowel sounded were hypoactive but was soft.   EXTREMITIES:, No clubbing, cyanosis, or edema  NEUROLOGY: Sedated  SKIN: No rashes

## 2018-03-14 NOTE — PROGRESS NOTE ADULT - SUBJECTIVE AND OBJECTIVE BOX
OVERNIGHT EVENTS: still intubated, ventilated, pressors during HD only then stopped. Removed 2 L ultrafiltrate  still not following commands off sedation  became hypoxic and O2 was increased to 70%  Not tolerating diet        Vital Signs Last 24 Hrs  T(C): 36.9 (11 Mar 2018 08:00), Max: 37 (11 Mar 2018 01:14)  T(F): 98.4 (11 Mar 2018 08:00), Max: 98.6 (11 Mar 2018 01:14)  HR: 60 (11 Mar 2018 08:00) (50 - 98)  BP: 138/73 (11 Mar 2018 08:00) (58/37 - 181/101)  BP(mean): 92 (11 Mar 2018 08:00) (42 - 132)  RR: 31 (11 Mar 2018 08:00) (28 - 80)  SpO2: 98% (11 Mar 2018 08:00) (90% - 100%)    PHYSICAL EXAMINATION:    GENERAL: sedated/ moves 4 extremities, follows simple commands    HEENT: Head is normocephalic and atraumatic. Extraocular muscles are intact. Mucous membranes are moist.    NECK: Supple.    LUNGS: b/l rhonchi    HEART: Regular rate and rhythm without murmur.    ABDOMEN: Soft, nontender, and slightly distended.      EXTREMITIES: Without any cyanosis, clubbing, rash, lesions or edema.    NEUROLOGIC: not following commands off sedation    SKIN: No ulceration or induration present.      LABS:                                                                                       7.9    10.34 )-----------( 54       ( 14 Mar 2018 05:19 )             24.1   03-14    139  |  100  |  145<HH>  ----------------------------<  94  4.8   |  20  |  7.0<HH>    Ca    7.5<L>      14 Mar 2018 05:19  Mg     2.6     03-14    TPro  7.1  /  Alb  1.6<L>  /  TBili  1.3<H>  /  DBili  x   /  AST  34  /  ALT  28  /  AlkPhos  51  03-14    ABG - ( 13 Mar 2018 15:03 )  pH: 7.32 /  pCO2: 39  /  pO2: 96    / HCO3: 24    / Base Excess: -2.9  /  SaO2: 95    / lac 2.0    VENT 450/32/70%/7.5            CARDIAC MARKERS ( 09 Mar 2018 11:03 )  0.58 ng/mL / x     / 280 U/L / x     / 3.4 ng/mL                FLU A B RSV Detection by PCR (03.10.18 @ 21:20)    Flu A Result: Negative: Negative results do not preclude influenza infection and  should not be used as the sole basis for treatment or  other patient management decisions.  A positive result may occur in the absence of viable virus.  By: [employer] Flu viral assay by Reverse Transcriptase  Polymerase Chain Reaction (RT-PCR).    Flu B Result: Negative        MEDICATIONS  (STANDING):  chlorhexidine 0.12% Liquid 15 milliLiter(s) Swish and Spit two times a day  dexmedetomidine Bolus 74 MICROGram(s) IV Bolus once  dexmedetomidine Infusion 0.2 MICROgram(s)/kG/Hr (3.745 mL/Hr) IV Continuous <Continuous>  fentaNYL   Infusion 2 MICROgram(s)/kG/Hr (14.98 mL/Hr) IV Continuous <Continuous>  influenza   Vaccine 0.5 milliLiter(s) IntraMuscular once  levoFLOXacin IVPB 250 milliGRAM(s) IV Intermittent every 48 hours  meropenem  IVPB 500 milliGRAM(s) IV Intermittent every 24 hours  pantoprazole  Injectable 40 milliGRAM(s) IV Push daily    MEDICATIONS  (PRN):  metoclopramide Injectable 5 milliGRAM(s) IV Push every 8 hours PRN increse GI residual      RADIOLOGY & ADDITIONAL STUDIES: unchanged bilateral opacities, ET to be pushed by 1cm, OG is ok OVERNIGHT EVENTS: still intubated, ventilated, pressors during HD only then stopped. Removed 2 L ultrafiltrate  moves 4 estremities  became hypoxic and O2 was increased to 70%/ agitated tachypneic off sedation  Not tolerating diet        Vital Signs Last 24 Hrs  T(C): 36.9 (11 Mar 2018 08:00), Max: 37 (11 Mar 2018 01:14)  T(F): 98.4 (11 Mar 2018 08:00), Max: 98.6 (11 Mar 2018 01:14)  HR: 60 (11 Mar 2018 08:00) (50 - 98)  BP: 138/73 (11 Mar 2018 08:00) (58/37 - 181/101)  BP(mean): 92 (11 Mar 2018 08:00) (42 - 132)  RR: 31 (11 Mar 2018 08:00) (28 - 80)  SpO2: 98% (11 Mar 2018 08:00) (90% - 100%)    PHYSICAL EXAMINATION:    GENERAL: sedated/ moves 4 extremities, follows simple commands    HEENT: Head is normocephalic and atraumatic. Extraocular muscles are intact. Mucous membranes are moist.    NECK: Supple.    LUNGS: b/l rhonchi    HEART: Regular rate and rhythm without murmur.    ABDOMEN: Soft, nontender, and slightly distended.      EXTREMITIES: Without any cyanosis, clubbing, rash, lesions or edema.    NEUROLOGIC: not following commands off sedation    SKIN: No ulceration or induration present.      LABS:                                                                                       7.9    10.34 )-----------( 54       ( 14 Mar 2018 05:19 )             24.1   03-14    139  |  100  |  145<HH>  ----------------------------<  94  4.8   |  20  |  7.0<HH>    Ca    7.5<L>      14 Mar 2018 05:19  Mg     2.6     03-14    TPro  7.1  /  Alb  1.6<L>  /  TBili  1.3<H>  /  DBili  x   /  AST  34  /  ALT  28  /  AlkPhos  51  03-14    ABG - ( 13 Mar 2018 15:03 )  pH: 7.32 /  pCO2: 39  /  pO2: 96    / HCO3: 24    / Base Excess: -2.9  /  SaO2: 95    / lac 2.0    VENT 450/32/70%/7.5            CARDIAC MARKERS ( 09 Mar 2018 11:03 )  0.58 ng/mL / x     / 280 U/L / x     / 3.4 ng/mL                FLU A B RSV Detection by PCR (03.10.18 @ 21:20)    Flu A Result: Negative: Negative results do not preclude influenza infection and  should not be used as the sole basis for treatment or  other patient management decisions.  A positive result may occur in the absence of viable virus.  By: [facility] Flu viral assay by Reverse Transcriptase  Polymerase Chain Reaction (RT-PCR).    Flu B Result: Negative        MEDICATIONS  (STANDING):  chlorhexidine 0.12% Liquid 15 milliLiter(s) Swish and Spit two times a day  dexmedetomidine Bolus 74 MICROGram(s) IV Bolus once  dexmedetomidine Infusion 0.2 MICROgram(s)/kG/Hr (3.745 mL/Hr) IV Continuous <Continuous>  fentaNYL   Infusion 2 MICROgram(s)/kG/Hr (14.98 mL/Hr) IV Continuous <Continuous>  influenza   Vaccine 0.5 milliLiter(s) IntraMuscular once  levoFLOXacin IVPB 250 milliGRAM(s) IV Intermittent every 48 hours  meropenem  IVPB 500 milliGRAM(s) IV Intermittent every 24 hours  pantoprazole  Injectable 40 milliGRAM(s) IV Push daily    MEDICATIONS  (PRN):  metoclopramide Injectable 5 milliGRAM(s) IV Push every 8 hours PRN increse GI residual      RADIOLOGY & ADDITIONAL STUDIES: unchanged bilateral opacities, ET to be pushed by 1cm, OG is ok

## 2018-03-15 NOTE — PRE-ANESTHESIA EVALUATION ADULT - NSANTHOSAYNRD_GEN_A_CORE
n/a pt is intubated/No. KOLTON screening performed.  STOP BANG Legend: 0-2 = LOW Risk; 3-4 = INTERMEDIATE Risk; 5-8 = HIGH Risk

## 2018-03-15 NOTE — PROGRESS NOTE ADULT - SUBJECTIVE AND OBJECTIVE BOX
PGY I NOTE    LENGTH OF HOSPITAL STAY:  6d    Pt seen and examined at bedside. Follows simple commands. Moves all extremities. NG tube drained 800cc in 24 hours. No obstruction. Will repeat KUB and add erythromycin for gastroparesis and re-call nutrition    CHIEF COMPLAINT:Patient is a 67y old  Male who presents with a chief complaint of sob, weakness (09 Mar 2018 05:52)    HPI:HPI:  66 y/o M PMH Multiple Myeloma, Anemia who presents with weakness, SOB.  Patient had a dry cough and felt like he was sick for the past 2 weeks and this worsened over the past 2 days.  Patient is unable to walk without being extremely dyspneic , dyspnea gradually progressed to be at rest. Patient had chills in the last 2 days Patient is weak and not eating and not drinking well. Patient also has bone pain in his right flank.  Patient has not seen a doctor in numerous years and is not on medication.  Family felt he looked pale and weak. Patient denies any hematuria, melena or hematochezia. Patient is on iron tablets since 5 years, since then his stool is dark, with no recent change. Patient denies any alteration in bowl movements no nausea or vomiting, but there is decrease in PO intake. In the ER patient received one unit of blood , when he was received the second unit, he spiked a tem of 38.5, no change in hemodynamics, work up for transfusion reaction is sent. Patient was not taking medication or following up with any doctor cause he believes in herbal medicine only. (09 Mar 2018 05:52)    OVERNIGHT EVENTS/INTERVAL UPDATES:    PMH & PSH  PAST MEDICAL & SURGICAL HISTORY:  Anemia, unspecified type  Multiple myeloma, remission status unspecified    SOCIAL HISTORY: Negative    ALLERGIES: No Known Allergies    HOME MEDICATIONS  Home Medications:    PHYSICAL EXAM:  T(F): 98.2 (03-15-18 @ 04:00), Max: 99.9 (03-14-18 @ 15:00)  HR: 66 (03-15-18 @ 08:00)  BP: 134/76 (03-15-18 @ 08:00)  RR: 32 (03-15-18 @ 08:00)  SpO2: 100% (03-15-18 @ 07:55)  CAPILLARY BLOOD GLUCOSE          03-14-18 @ 07:01  -  03-15-18 @ 07:00  --------------------------------------------------------  IN:    dexmedetomidine Infusion: 287 mL    fentaNYL  Infusion: 318.8 mL    IV PiggyBack: 200 mL  Total IN: 805.8 mL    OUT:    Indwelling Catheter - Urethral: 104 mL    Nasoenteral Tube: 800 mL    Other: 2500 mL  Total OUT: 3404 mL    Total NET: -2598.2 mL        Mode: AC/ CMV (Assist Control/ Continuous Mandatory Ventilation), RR (machine): 32, TV (machine): 450, FiO2: 70, PEEP: 7.5, ITime: 1, MAP: 17, PIP: 21    General: NAD  HEENT: NCAT  CV: RRR  RESP: CTAB  Abdominal: Soft, NTTP  Extremity: no c/c/e  Neuro: A&O x3    MEDICATIONS  STANDING MEDICATIONS  chlorhexidine 0.12% Liquid 15 milliLiter(s) Swish and Spit two times a day  dexmedetomidine Infusion 0.2 MICROgram(s)/kG/Hr IV Continuous <Continuous>  fentaNYL   Infusion 2 MICROgram(s)/kG/Hr IV Continuous <Continuous>  influenza   Vaccine 0.5 milliLiter(s) IntraMuscular once  lactulose Syrup 30 Gram(s) Oral every 4 hours  levoFLOXacin IVPB 250 milliGRAM(s) IV Intermittent every 48 hours  meropenem  IVPB 750 milliGRAM(s) IV Intermittent every 24 hours  micafungin IVPB      micafungin IVPB 100 milliGRAM(s) IV Intermittent every 24 hours  norepinephrine Infusion 0.5 MICROgram(s)/kG/Min IV Continuous <Continuous>  pantoprazole  Injectable 40 milliGRAM(s) IV Push daily  senna 1 Tablet(s) Oral two times a day  vancomycin  IVPB 1250 milliGRAM(s) IV Intermittent every 24 hours    PRN MEDICATIONS  metoclopramide Injectable 5 milliGRAM(s) IV Push every 8 hours PRN    LABS:                        7.7    12.11 )-----------( 58       ( 15 Mar 2018 05:35 )             23.6              03-15    142  |  106  |  125<HH>  ----------------------------<  95  5.1<H>   |  21  |  6.6<HH>    Ca    7.6<L>      15 Mar 2018 05:35  Mg     2.4     03-15    TPro  7.1  /  Alb  1.5<L>  /  TBili  1.5<H>  /  DBili  x   /  AST  26  /  ALT  26  /  AlkPhos  49  03-15    LIVER FUNCTIONS - ( 15 Mar 2018 05:35 )  Alb: 1.5 g/dL / Pro: 7.1 g/dL / ALK PHOS: 49 U/L / ALT: 26 U/L / AST: 26 U/L / GGT: x                      PT/INR - ( 15 Mar 2018 05:35 )   PT: 14.90 sec;   INR: 1.37 ratio         PTT - ( 15 Mar 2018 05:35 )  PTT:45.0 sec                  ABG - ( 15 Mar 2018 07:57 )  pH: 7.34  /  pCO2: 38    /  pO2: 150   / HCO3: 21    / Base Excess: -4.8  /  SaO2: 100                   Culture - Blood (collected 13 Mar 2018 05:45)  Source: .Blood None  Preliminary Report (14 Mar 2018 12:13):    No growth to date.    Culture - Bronchial (collected 12 Mar 2018 17:12)  Source: .Broncial None  Gram Stain (13 Mar 2018 23:00):    Few polymorphonuclear leukocytes per low power field    No squamous epithelial cells per low power field    No organisms seen per oil power field  Preliminary Report (14 Mar 2018 18:48):    No growth to date.    Culture - Fungal, Body Fluid (collected 12 Mar 2018 17:12)  Source: .Body Fluid Bronchial Washing (not pleural fluid)  Preliminary Report (13 Mar 2018 07:28):    Testing in progress    Culture - Blood (collected 12 Mar 2018 16:29)  Source: .Blood None  Preliminary Report (14 Mar 2018 02:01):    No growth to date.    Culture - Blood (collected 12 Mar 2018 11:14)  Source: .Blood None  Preliminary Report (14 Mar 2018 02:01):    No growth to date.    Culture - Blood (collected 12 Mar 2018 11:14)  Source: .Blood None  Preliminary Report (14 Mar 2018 02:01):    No growth to date.      IMAGING:    < from: Xray Kidney Ureter Bladder (03.14.18 @ 15:38) >  Marked gaseous distention of the stomach which may represent an element   of gastroparesis.    No evidence of a bowel obstruction.    < end of copied text >

## 2018-03-15 NOTE — PROGRESS NOTE ADULT - SUBJECTIVE AND OBJECTIVE BOX
GASTROPARESIS, S/P MICHAEL    PAST MEDICAL & SURGICAL HISTORY:  Anemia, unspecified type  Multiple myeloma, remission status unspecified    Vital Signs Last 24 Hrs  T(C): 36.8 (15 Mar 2018 04:00), Max: 37.7 (14 Mar 2018 15:00)  T(F): 98.2 (15 Mar 2018 04:00), Max: 99.9 (14 Mar 2018 15:00)  HR: 66 (15 Mar 2018 08:00) (63 - 90)  BP: 134/76 (15 Mar 2018 08:00) (102/55 - 166/78)  BP(mean): 94 (15 Mar 2018 08:00) (78 - 113)  RR: 32 (15 Mar 2018 08:00) (15 - 32)  SpO2: 100% (15 Mar 2018 07:55) (100% - 100%)    Mode: AC/ CMV (Assist Control/ Continuous Mandatory Ventilation)  RR (machine): 32  TV (machine): 450  FiO2: 70  PEEP: 7.5  ITime: 1  MAP: 17  PIP: 21    I&O's Detail    14 Mar 2018 07:01  -  15 Mar 2018 07:00  --------------------------------------------------------  IN:    dexmedetomidine Infusion: 287 mL    fentaNYL  Infusion: 318.8 mL    IV PiggyBack: 200 mL  Total IN: 805.8 mL    OUT:    Indwelling Catheter - Urethral: 104 mL    Nasoenteral Tube: 800 mL    Other: 2500 mL  Total OUT: 3404 mL    Total NET: -2598.2 mL               7.7    12.11 )-----------( 58       ( 03-15 @ 05:35 )             23.6                7.9    10.34 )-----------( 54       (  @ 05:19 )             24.1                8.8    13.11 )-----------( 65       (  @ 17:19 )             26.3                  142   |  106   |  125                Ca: 7.6    BMP:   ----------------------------< 95     M.4   (03-15-18 @ 05:35)             5.1    |  21    | 6.6                Ph: x        LFT:     TPro: 7.1 / Alb: 1.5 / TBili: 1.5 / DBili: x / AST: 26 / ALT: 26 / AlkPhos: 49   (03-15-18 @ 05:35)    PT/INR - ( 15 Mar 2018 05:35 )   PT: 14.90 sec;   INR: 1.37 ratio       PTT - ( 15 Mar 2018 05:35 )  PTT:45.0 sec    ABG - ( 15 Mar 2018 07:57 )  pH: 7.34  /  pCO2: 38    /  pO2: 150   / HCO3: 21    / Base Excess: -4.8  /  SaO2: 100       Culture - Blood (collected 13 Mar 2018 05:45)  Source: .Blood None  Preliminary Report (14 Mar 2018 12:13):    No growth to date.    Culture - Bronchial (collected 12 Mar 2018 17:12)  Source: .Broncial None  Gram Stain (13 Mar 2018 23:00):    Few polymorphonuclear leukocytes per low power field    No squamous epithelial cells per low power field    No organisms seen per oil power field  Preliminary Report (14 Mar 2018 18:48):    No growth to date.    Culture - Fungal, Body Fluid (collected 12 Mar 2018 17:12)  Source: .Body Fluid Bronchial Washing (not pleural fluid)  Preliminary Report (13 Mar 2018 07:28):    Testing in progress    Culture - Blood (collected 12 Mar 2018 16:29)  Source: .Blood None  Preliminary Report (14 Mar 2018 02:01):    No growth to date.    Culture - Blood (collected 12 Mar 2018 11:14)  Source: .Blood None  Preliminary Report (14 Mar 2018 02:01):    No growth to date.    Culture - Blood (collected 12 Mar 2018 11:14)  Source: .Blood None  Preliminary Report (14 Mar 2018 02:01):    No growth to date.    MEDICATIONS  (STANDING):  chlorhexidine 0.12% Liquid 15 milliLiter(s) Swish and Spit two times a day  dexmedetomidine Infusion 0.2 MICROgram(s)/kG/Hr (3.745 mL/Hr) IV Continuous <Continuous>  fentaNYL   Infusion 2 MICROgram(s)/kG/Hr (14.98 mL/Hr) IV Continuous <Continuous>  influenza   Vaccine 0.5 milliLiter(s) IntraMuscular once  lactulose Syrup 30 Gram(s) Oral every 4 hours  levoFLOXacin IVPB 250 milliGRAM(s) IV Intermittent every 48 hours  meropenem  IVPB 500 milliGRAM(s) IV Intermittent every 24 hours  micafungin IVPB      micafungin IVPB 100 milliGRAM(s) IV Intermittent every 24 hours  norepinephrine Infusion 0.5 MICROgram(s)/kG/Min (35.109 mL/Hr) IV Continuous <Continuous>  pantoprazole  Injectable 40 milliGRAM(s) IV Push daily  senna 1 Tablet(s) Oral two times a day    MEDICATIONS  (PRN):  metoclopramide Injectable 5 milliGRAM(s) IV Push every 8 hours PRN increse GI residual    Diet, NPO with Tube Feed:   Vital High Protein    Tube Feeding Modality: Orogastric  Total Volume for 24 Hours (mL): 1440  Continuous  Starting Tube Feed Rate mL per Hour: 60  Until Goal Tube Feed Rate (mL per Hour): 60  Tube Feed Duration (in Hours): 24  Tube Feed Start Time: 13:00 (18 @ 12:41)  Diet, NPO after Midnight:      NPO Start Date: 13-Mar-2018,   NPO Start Time: 23:59 (18 @ 19:11)  Diet, NPO:   NPO for Procedure/Test     NPO Start Date: 14-Mar-2018,   NPO Start Time: 12:00 (18 @ 15:12)    PHYSICAL EXAM:  General Appearance: Intubated  Neck: Supple  Chest: Equal expansion bilaterally, equal breath sounds  CV: S1, S2, RRR  Abdomen: Soft, NT, ND  Extremities: Grossly symmetric, WNL

## 2018-03-15 NOTE — PROGRESS NOTE ADULT - ASSESSMENT
MADISON/MM/sepsis/respiratory failure/mixed metabolic and respiratory acidosis/anemia/hyperkalemia /hypercalcemia     ·	M[place]ISON ATN in [facility] sp HD yesterday will reassess in AM   ·	Still on levophed keep MAP >65   ·	Ca noted better following HD   ·	on Merrem Micafungin for now   ·	Respiratory failure on MV as per pulmonary    # overall prognosis poor   # will follow

## 2018-03-15 NOTE — PROGRESS NOTE ADULT - SUBJECTIVE AND OBJECTIVE BOX
Nephrology progress note    Patient is seen and examined, events over the last 24 h noted .  sp HD yesterday  following commands    Allergies:  No Known Allergies    Hospital Medications:   MEDICATIONS  (STANDING):  chlorhexidine 0.12% Liquid 15 milliLiter(s) Swish and Spit two times a day  dexmedetomidine Infusion 0.2 MICROgram(s)/kG/Hr (3.745 mL/Hr) IV Continuous <Continuous>  fentaNYL   Infusion 2 MICROgram(s)/kG/Hr (14.98 mL/Hr) IV Continuous <Continuous>  influenza   Vaccine 0.5 milliLiter(s) IntraMuscular once  lactulose Syrup 30 Gram(s) Oral every 4 hours  levoFLOXacin IVPB 250 milliGRAM(s) IV Intermittent every 48 hours  meropenem  IVPB 500 milliGRAM(s) IV Intermittent every 24 hours    micafungin IVPB 100 milliGRAM(s) IV Intermittent every 24 hours  norepinephrine Infusion 0.5 MICROgram(s)/kG/Min (35.109 mL/Hr) IV Continuous <Continuous>  pantoprazole  Injectable 40 milliGRAM(s) IV Push daily  senna 1 Tablet(s) Oral two times a day        VITALS:  T(F): 98.2 (03-15-18 @ 04:00), Max: 99.9 (18 @ 15:00)  HR: 66 (03-15-18 @ 08:00)  BP: 134/76 (03-15-18 @ 08:00)  RR: 32 (03-15-18 @ 08:00)  SpO2: 100% (03-15-18 @ 07:55)       @ :  -   @ 07:00  --------------------------------------------------------  IN: 1800.4 mL / OUT: 2076 mL / NET: -275.7 mL     @ 07:01  -  03-15 @ 07:00  --------------------------------------------------------  IN: 805.8 mL / OUT: 3404 mL / NET: -2598.2 mL          PHYSICAL EXAM:  Constitutional: on mV intubated   HEENT: anicteric sclera, oropharynx clear, MMM/ positive NGT   Neck: No JVD  Respiratory: Crackles at base   Cardiovascular: S1, S2, RRR  Gastrointestinal: BS+, soft, NT/ND  Extremities: No cyanosis or clubbing. No peripheral edema  :  positive alva    Skin: No rashes    LABS:  03-15    142  |  106  |  125<HH>  ----------------------------<  95  5.1<H>   |  21  |  6.6<HH>    Ca    7.6<L>      15 Mar 2018 05:35  Mg     2.4     03-15    TPro  7.1  /  Alb  1.5<L>  /  TBili  1.5<H>  /  DBili      /  AST  26  /  ALT  26  /  AlkPhos  49  03-15                          7.7    12.11 )-----------( 58       ( 15 Mar 2018 05:35 )             23.6       Urine Studies:  Urinalysis Basic - ( 12 Mar 2018 00:47 )    Color: Red / Appearance: Turbid / S.025 / pH:   Gluc:  / Ketone: Negative  / Bili: Small / Urobili: 0.2 mg/dL   Blood:  / Protein: 30 mg/dL / Nitrite: Negative   Leuk Esterase: Trace / RBC:  / WBC 6-10 /HPF   Sq Epi:  / Non Sq Epi: Moderate /HPF / Bacteria: Many /HPF        RADIOLOGY & ADDITIONAL STUDIES:

## 2018-03-15 NOTE — PROGRESS NOTE ADULT - ASSESSMENT
67 year male patient with history of MM diagnosed in 2011, was on trial medication in [facility] for 3years, then did not seek any medical care thereafter presented for SOB, cough , generalized weakness and decreased po intake since 2 weeks.    #Septic Shock  2/2 PNA/ Acute hypoxic respiratory failure  - Intubated 3/9  - c/w Vanc/Meropenem/Levoquin/Micrafungin per fellow  - Cultures NGTD  - Flu negative  - G1DD on ECHO  - s/p Bronc 3/12  - BAL NGTD  - Urine legionella negative    #Multiple Myeloma/Renal failure  - s/p HD x4  - HD planned for tomorrow   - Can start pressors if BP drops  - s/p four days of pulse Dexamethasone 40 MG IV per heme onc  - Palliative c/s placed. Full code at this time. Family wants another meeting with palliative and healthcare team    #Anemia- Multifactorial   - Transfuse <7  - s/p 8U PRBC    # Thrombocytopenia/Elevated PT/PTT 2/2 Sepsis vs DIC/ HIT  - No heparin products  - LE doppler negative for DVT   - D-dimer 1490, Fibrinogen 567, not bleeding  - if bleeding  and PT/PTT>1.5 UPN  can administer 10-15ML of FFP as per heme onc  - Monitor PT/PTT and Fibrinogen if bleeding  - May need a dose of Vitamin K oral  1-2.5mg to prevent K deficiency since on antibiotics    # Not tolerating feeds/ Abd distention  - NPO  - KUB 3/15 showed gastroparesis w/o obstruction  - Lactulose 30 q4  - Will add erythromycin    DVT ppx- SCD's  Poor prognosis

## 2018-03-15 NOTE — PROGRESS NOTE ADULT - SUBJECTIVE AND OBJECTIVE BOX
No new evetnsts. His sister was at bed side. I saw the patient around 5:00 pm.  His gastroparesis   resolved. HE is now back on feeds. No Bleeding.         Vital Signs Last 24 Hrs  T(C): 37.4 (15 Mar 2018 18:00), Max: 37.7 (14 Mar 2018 20:00)  T(F): 99.4 (15 Mar 2018 18:00), Max: 99.9 (14 Mar 2018 20:00)  HR: 68 (15 Mar 2018 18:00) (64 - 74)  BP: 119/71 (15 Mar 2018 18:00) (110/60 - 154/93)  BP(mean): 83 (15 Mar 2018 18:00) (78 - 116)  RR: 32 (15 Mar 2018 18:00) (15 - 32)  SpO2: 100% (15 Mar 2018 18:00) (98% - 100%)    Allergies:No Known Allergies  Intolerances  MEDICATIONS  (STANDING):  chlorhexidine 0.12% Liquid 15 milliLiter(s) Swish and Spit two times a day  dexmedetomidine Infusion 0.2 MICROgram(s)/kG/Hr (3.745 mL/Hr) IV Continuous <Continuous>  erythromycin     base Tablet 250 milliGRAM(s) Oral every 8 hours  fentaNYL   Infusion 2 MICROgram(s)/kG/Hr (14.98 mL/Hr) IV Continuous <Continuous>  influenza   Vaccine 0.5 milliLiter(s) IntraMuscular once  lactulose Syrup 30 Gram(s) Oral every 4 hours  levoFLOXacin IVPB 250 milliGRAM(s) IV Intermittent every 24 hours  meropenem  IVPB 750 milliGRAM(s) IV Intermittent every 24 hours  micafungin IVPB      micafungin IVPB 100 milliGRAM(s) IV Intermittent every 24 hours  norepinephrine Infusion 0.5 MICROgram(s)/kG/Min (35.109 mL/Hr) IV Continuous <Continuous>  pantoprazole  Injectable 40 milliGRAM(s) IV Push daily  senna 1 Tablet(s) Oral two times a day  vancomycin  IVPB 1250 milliGRAM(s) IV Intermittent every 48 hours    MEDICATIONS  (PRN):  metoclopramide Injectable 5 milliGRAM(s) IV Push every 8 hours PRN increse GI residual      CBC Full  -  ( 15 Mar 2018 05:35 )  WBC Count : 12.11 K/uL  Hemoglobin : 7.7 g/dL  Hematocrit : 23.6 %  Platelet Count - Automated : 58 K/uL  Mean Cell Volume : 90.1 fL  Mean Cell Hemoglobin : 29.4 pg  Mean Cell Hemoglobin Concentration : 32.6 g/dL  Auto Neutrophil # : 10.97 K/uL  Auto Lymphocyte # : 0.38 K/uL  Auto Monocyte # : 0.25 K/uL  Auto Eosinophil # : 0.01 K/uL  Auto Basophil # : 0.02 K/uL  Auto Neutrophil % : 90.5 %  Auto Lymphocyte % : 3.1 %  Auto Monocyte % : 2.1 %  Auto Eosinophil % : 0.1 %  Auto Basophil % : 0.2 %  PT/INR - ( 15 Mar 2018 05:35 )   PT: 14.90 sec;   INR: 1.37 ratio         PTT - ( 15 Mar 2018 05:35 )  PTT:45.0 sec             7.7    12.11 )-----------( 58       ( 03-15 @ 05:35 )             23.6                7.9    10.34 )-----------( 54       ( 03-14 @ 05:19 )             24.1                8.8    13.11 )-----------( 65       ( 03-13 @ 17:19 )             26.3                5.3    13.62 )-----------( 76       ( 03-13 @ 05:45 )             16.2                7.0    16.89 )-----------( 94       ( 03-12 @ 05:39 )             21.4                6.9    23.00 )-----------( 132      ( 03-11 @ 05:16 )             21.4                7.3    20.50 )-----------( 136      ( 03-10 @ 20:02 )             22.9       03-15    142  |  106  |  125<HH>  ----------------------------<  95  5.1<H>   |  21  |  6.6<HH>    Ca    7.6<L>      15 Mar 2018 05:35  Mg     2.4     03-15    TPro  7.1  /  Alb  1.5<L>  /  TBili  1.5<H>  /  DBili  x   /  AST  26  /  ALT  26  /  AlkPhos  49  03-15  LIVER FUNCTIONS - ( 15 Mar 2018 05:35 )  Alb: 1.5 g/dL / Pro: 7.1 g/dL / ALK PHOS: 49 U/L / ALT: 26 U/L / AST: 26 U/L / GGT: x             Culture - Blood (collected 13 Mar 2018 05:45)  Source: .Blood None  Preliminary Report (14 Mar 2018 12:13):    No growth to date.        CXR:  CXR:  Image(s) Available  --  --  --  --  --  CXR:  Image(s) Available  --  --  --  --  --  CXR:  Image(s) Available  --  --  --    EXAM:  XR CHEST PORTABLE ROUTINE 1V            PROCEDURE DATE:  03/15/2018            INTERPRETATION:  Clinical History / Reason for exam: Intubated.    Comparison : Chest radiograph March 14, 2018.    Technique/Positioning: AP portable.    Findings:    Support devices: Endotracheal tube, enteric tube, and right-sided central   venous catheter in stable position. Telemetry leads overlying the chest.    Cardiac/mediastinum/hilum: Unchanged.    Lung parenchyma/Pleura: Unchanged bilateral opacities. No pneumothorax.    Skeleton/soft tissues: Unchanged.    Impression:      Unchanged bilateral opacities.    Support devices as described above.              SARAH OROZCO M.D., RESIDENT RADIOLOGIST  This document has been electronically signed.  PARMINDER CROWDER M.D., ATTENDING RADIOLOGIST  This document has been electronically signed. Mar 15 2018 11:23AM              --  CXR:  Image(s) Available  --  --  --  --  --      Path:      Skeletal survey (2011) Impression: Two lucencies within the skull, may represent a venous lake or benign process, correlate clinically with laboratory values ? No other lytic lesions identified ? C5-6 C6-7 degenerative disk disease cervical spine ? Mild bilateral knee degenerative changes     Textual Result SEE TEXT (2011)   RESULTS FOR PATIENT - #[id number]  ARLEY COKER  [GENO/LA LT CHAIN]  [id number] - [id number]  IRASEMA: 07/27/11 1733  RECV: 07/27/11 1927       FREE KAPPA SERU                24.3   H    mg/L          (3.3-19.4)       FREE LAMBDA SER                 2.0   L    mg/L          (5.7-26.3)       FREE K/L RATIO                12.15   H                 (0.26-1.65)    Result Name Results Units Reference Range Textual Result SEE TEXT   [id number] - [id number]  IRASEMA: 07/27/11 1733  RECV: 07/27/11 1927       IgA                            2944   H    mg/dL           ()    IMMUNOFIX SERUM (()) IgA-Kappa monoclonal protein present. Test Performed at: [place] [place] [address] unless otherwise noted.      Bone marrow biopsy: 8/2011: Palmacytosis with increased kappa light chains.          REVIEW OF SYSTEMS:    Unable to obtain this he is intubated and sedated. See above from family.    PYSICAL EXAM:  GENERAL: Sedated, ET, OT and right TLC in place  HEAD:  Atraumatic, Normocephalic  EYES:  conjunctiva and sclera clear  CHEST/LUNG No wheeze, transmitted ET tube sounds and rhonchi in bases about the same  HEART: Regular rate and rhythm; No murmurs, rubs, or gallops  ABDOMEN: His abdomen is soft, not distended  EXTREMITIES:, No clubbing, cyanosis, or edema, has SCDs in place  NEUROLOGY: Sedated  SKIN: No rashes

## 2018-03-15 NOTE — PROGRESS NOTE ADULT - ASSESSMENT
Multilobar PNA especially on the RUL and also on the left.  CXR markedly better, especially on the left side.  S/P BAL. So far unremarkable.  Urine for legionella antigen pending.  Vanco 1250mg post HD.  Levo 250 mg iv q24h.  Meropenem 750 mg iv q24h

## 2018-03-15 NOTE — PROGRESS NOTE ADULT - ASSESSMENT
ASSESSMENT/PLAN ASSESSMENT/PLAN  SPOKE WITH CCU team  suggest restarting the tube feed  PEPTAMEN af at 30cc /hr for 3hrs then if abdominal exam is wnl then increase it to peptamen af at 65cc /hr  if unable tolerate the tube fed then will  need a central acces for TPN

## 2018-03-15 NOTE — PROGRESS NOTE ADULT - ATTENDING COMMENTS
pt seen, issues discussed with NST and with Pulmonologist earlier today.  There had been concern with high GRV over last 24 h, and NG output about 800 ml this morning. By mid-day, stomach appeared to be empty, on exam pt not signif distended. Now receiving erythro as promotility agent.  s/p HD yesterday. Labs reviewed.  CXR noted.    pt off propofol > 24 h.  Suggest as above:  restart tube feeds, but with Peptamen AF at  30 ml/h. If tolerated after 3 h increase to goal 65 ml/h.  If not tolerated overnight, must consider placing CVC for TPN tomorrow.

## 2018-03-15 NOTE — PROGRESS NOTE ADULT - ASSESSMENT
67yMale being evaluated for goals of care, symptom management. Pt condition unchanged. Vented and sedated, failed SBT still requiring high O2 on vent settings. MADISON not improving at this time. No family at bed side today. Medical team in CCU reached out to request their presence on rounds tomorrow. Heme/onc has offered treatment if patient improves medically. Pt now on day 6 of intubation.        Family had previously told palliative care they wanted aggressive medical treatment      Recommendations:  Palliative NP to attend meeting with CCU team to provide support   Supportive care for family - sister is HCP  Pt comfortable on sedation

## 2018-03-15 NOTE — PROGRESS NOTE ADULT - ASSESSMENT
66 y/o M PMH Multiple Myeloma ( was  being treated with holistics approach, Anemia who presented  with weakness/ SOB for past 2 weeks , worsened over the past 2 days, admitted with diagnosis of sepsis due to multifocal pneumonia/ acute hypoxemic respiratory failure requiring intubation.    #Septic Shock  2/2 PNA/ Acute hypoxic respiratory failure  - respiratory status unchanged PEEP 7.5, O2 50%  -antibiotics as per team  --pressors as per team, currently off    #History of Multiple Myeloma now in Severe Renal failure/ Metabolic acidosis, the renal failure maybe multifactorial, sepsis and myeloma  -S/P 4 days  of pulse Dexamethasone 40 MG IV daily now Day 7  -CTs did show lytic lesions < from: CT Chest No Cont (03.09.18 @ 00:54) >  Osseous lucent lesions and overall demineralization compatible patient's  known multiple myeloma.  -SPEP Mspike was 2.9 Immunoglobulin Free Light Chains, Serum (03.09.18 @ 17:13)    [place]/Lambda Free Light Chain Ratio, Serum: 777.50 Ratio    NANI Kappa: 311.00 mg/dL    NANI Lambda: 0.40 mg/dL  -will not offer any further treatment at this time, it most likley will not be of any benefit if he does not recover and he may have not wanted it.    #Anemia/ multifactorial   -stable HgB   Multiple myeloma/acute illness with bone marrow suppression,  anemia in CKD,   keep hb>7. transfusions   as needed   Ferritin 1559, sat 21, Folate 13.9    #lEvated pt/PTT possibly from DIC due to sepsis  with falling Fibrinogen  -no active gross bleeding a this time  -if bleeding  and PT/PTT>1.5 UPN  can administer 10-15ML of FFP and if Fibrinogen is less than 100 can give Cryoprecipitate as well.  -monitor PT/PTT and Fibrinogen if bleeding  -theodore  dose of Vitamin K  IV  1-2.5mg to prevent K deficiency since on antibiotics once    #Mild Hypercalcemia resolved   -this is multifactorial and maybe due to Myeloma or  Renal failure and is decreasing  -responding to Steroids for MM   and or HD  -no Bisphosphonates for now due to renal failure      #Elevated Bili and AST  -maybe due to organ dysfunction, liver from sepsis and or mild hemolysis from transfused PRBC   -monitor LFTS    #New thrombocytopenia  now moderate this is multifactorial and is stable  - Hep SC stopped   and this  started about  day 5. I doubt HIT the drop is not 50% and he has multiple other causes, infection, DIC,  medications thus isend a HIT serology panel but would not start DTI. His Dopplers 3/13 negative and he maybe bleeding  -keep plts >50,000 if bleeding or >10,000 if not. If overt DIC and bleeding may need a platelet count of >90,000  -once HIT panel is back and if negative restart Hep SC for DVT ppz    #Black OG secretions due to GI bleed? Medications? Old feeds? -resolved  -most likely was not GI blleed      Prognosis is poor. Family is aware.  I spoke withelsie hem again today

## 2018-03-15 NOTE — PROGRESS NOTE ADULT - SUBJECTIVE AND OBJECTIVE BOX
OVERNIGHT EVENTS: STILL INTUBATED, VENTILATED, S/P HD, ON FENTANYL/ PRECEDEX , NO PRESSORS, S/P KUB,  CC/ 24 H    Vital Signs Last 24 Hrs  T(C): 36.8 (15 Mar 2018 04:00), Max: 37.7 (14 Mar 2018 15:00)  T(F): 98.2 (15 Mar 2018 04:00), Max: 99.9 (14 Mar 2018 15:00)  HR: 66 (15 Mar 2018 08:00) (63 - 90)  BP: 134/76 (15 Mar 2018 08:00) (102/55 - 166/78)  BP(mean): 94 (15 Mar 2018 08:00) (78 - 113)  RR: 32 (15 Mar 2018 08:00) (15 - 32)  SpO2: 100% (15 Mar 2018 07:55) (100% - 100%)    PHYSICAL EXAMINATION:    GENERAL: The patient is awake and alert in no apparent distress.     HEENT: Head is normocephalic and atraumatic. Extraocular muscles are intact. Mucous membranes are moist.    NECK: Supple.    LUNGS: Clear to auscultation without wheezing, rales or rhonchi; respirations unlabored    HEART: Regular rate and rhythm without murmur.    ABDOMEN: Soft, nontender, and nondistended.      EXTREMITIES: Without any cyanosis, clubbing, rash, lesions or edema.    NEUROLOGIC: Grossly intact.    SKIN: No ulceration or induration present.      LABS:                        7.7    12.11 )-----------( 58       ( 15 Mar 2018 05:35 )             23.6     03-15    142  |  106  |  125<HH>  ----------------------------<  95  5.1<H>   |  21  |  6.6<HH>    Ca    7.6<L>      15 Mar 2018 05:35  Mg     2.4     03-15    TPro  7.1  /  Alb  1.5<L>  /  TBili  1.5<H>  /  DBili  x   /  AST  26  /  ALT  26  /  AlkPhos  49  03-15    PT/INR - ( 15 Mar 2018 05:35 )   PT: 14.90 sec;   INR: 1.37 ratio         PTT - ( 15 Mar 2018 05:35 )  PTT:45.0 sec    ABG - ( 15 Mar 2018 07:57 )  pH: 7.33  /  pCO2: 38    /  pO2: 150   / HCO3: 21    / Base Excess: -4.8  /  SaO2: 100       450/ 32/70/ 7.5            D-Dimer Assay, Quantitative: 6189 ng/mL DDU (03-14-18 @ 11:28)            MICROBIOLOGY:  Culture Results:   No growth to date. (03-13 @ 05:45)  Culture Results:   Testing in progress (03-12 @ 17:12)  Culture Results:   No growth to date. (03-12 @ 17:12)  Culture Results:   No growth to date. (03-12 @ 16:29)  Culture Results:   No growth to date. (03-12 @ 11:14)  Culture Results:   No growth to date. (03-12 @ 11:14)  Culture Results:   No growth to date. (03-12 @ 05:39)  Culture Results:   No growth (03-12 @ 00:46)      MEDICATIONS  (STANDING):  chlorhexidine 0.12% Liquid 15 milliLiter(s) Swish and Spit two times a day  dexmedetomidine Infusion 0.2 MICROgram(s)/kG/Hr (3.745 mL/Hr) IV Continuous <Continuous>  fentaNYL   Infusion 2 MICROgram(s)/kG/Hr (14.98 mL/Hr) IV Continuous <Continuous>  influenza   Vaccine 0.5 milliLiter(s) IntraMuscular once  lactulose Syrup 30 Gram(s) Oral every 4 hours  levoFLOXacin IVPB 250 milliGRAM(s) IV Intermittent every 48 hours  meropenem  IVPB 500 milliGRAM(s) IV Intermittent every 24 hours  micafungin IVPB      micafungin IVPB 100 milliGRAM(s) IV Intermittent every 24 hours  norepinephrine Infusion 0.5 MICROgram(s)/kG/Min (35.109 mL/Hr) IV Continuous <Continuous>  pantoprazole  Injectable 40 milliGRAM(s) IV Push daily  senna 1 Tablet(s) Oral two times a day    MEDICATIONS  (PRN):  metoclopramide Injectable 5 milliGRAM(s) IV Push every 8 hours PRN increse GI residual      RADIOLOGY & ADDITIONAL STUDIES:

## 2018-03-15 NOTE — PROGRESS NOTE ADULT - SUBJECTIVE AND OBJECTIVE BOX
Patient is a 67y old  Male who presents with a chief complaint of sob, weakness (09 Mar 2018 05:52)  pt seen and evaluated   event noted not tolerating tube feed as per team residual volume wa above 800cc  on tube feed  Diet, NPO with Tube Feed:   Vital High Protein    Tube Feeding Modality: Orogastric  Total Volume for 24 Hours (mL): 1440  Continuous  Starting Tube Feed Rate mL per Hour: 60  Until Goal Tube Feed Rate (mL per Hour): 60  Tube Feed Duration (in Hours): 24  Tube Feed Start Time: 13:00 (03-12-18 @ 12:41)      LABS  03-15    142  |  106  |  125<HH>  ----------------------------<  95  5.1<H>   |  21  |  6.6<HH>    Ca    7.6<L>      15 Mar 2018 05:35  Mg     2.4     03-15    TPro  7.1  /  Alb  1.5<L>  /  TBili  1.5<H>  /  DBili  x   /  AST  26  /  ALT  26  /  AlkPhos  49  03-15                          7.7    12.11 )-----------( 58       ( 15 Mar 2018 05:35 )             23.6     ALLERGY:NKDA      Drug Dosing Weight  Height (cm): 176.53 (09 Mar 2018 05:10)  Weight (kg): 74.9 (09 Mar 2018 05:10)  BMI (kg/m2): 24 (09 Mar 2018 05:10)  BSA (m2): 1.91 (09 Mar 2018 05:10)  T(C): 36.8 (03-15-18 @ 04:00), Max: 37.7 (03-14-18 @ 15:00)  HR: 68 (03-15-18 @ 14:00) (63 - 86)  BP: 154/88 (03-15-18 @ 14:00) (102/55 - 154/93)  RR: 32 (03-15-18 @ 14:00) (15 - 32)  SpO2: 100% (03-15-18 @ 07:55) (100% - 100%)      03-14-18 @ 07:01  -  03-15-18 @ 07:00  --------------------------------------------------------  IN: 805.8 mL / OUT: 3404 mL / NET: -2598.2 mL    03-15-18 @ 07:01  -  03-15-18 @ 14:44  --------------------------------------------------------  IN: 295.4 mL / OUT: 92 mL / NET: 203.4 mL        chlorhexidine 0.12% Liquid 15 milliLiter(s) Swish and Spit two times a day  dexmedetomidine Infusion 0.2 MICROgram(s)/kG/Hr IV Continuous <Continuous>  erythromycin     base Tablet 250 milliGRAM(s) Oral every 8 hours  fentaNYL   Infusion 2 MICROgram(s)/kG/Hr IV Continuous <Continuous>  influenza   Vaccine 0.5 milliLiter(s) IntraMuscular once  lactulose Syrup 30 Gram(s) Oral every 4 hours  levoFLOXacin IVPB 250 milliGRAM(s) IV Intermittent every 24 hours  meropenem  IVPB 750 milliGRAM(s) IV Intermittent every 24 hours  metoclopramide Injectable 5 milliGRAM(s) IV Push every 8 hours PRN  micafungin IVPB      micafungin IVPB 100 milliGRAM(s) IV Intermittent every 24 hours  norepinephrine Infusion 0.5 MICROgram(s)/kG/Min IV Continuous <Continuous>  pantoprazole  Injectable 40 milliGRAM(s) IV Push daily  senna 1 Tablet(s) Oral two times a day  vancomycin  IVPB 1250 milliGRAM(s) IV Intermittent every 48 hours      RADIOLOGY STUDIES  < from: Xray Chest 1 View- PORTABLE-Routine (03.15.18 @ 05:01) >  Impression:    Unchanged bilateral opacities.  Support devices as described above. Patient is a 67y old  Male who presents with a chief complaint of sob, weakness (09 Mar 2018 05:52)  pt seen and evaluated   event noted not tolerating tube feed as per team residual volume wa above 800cc  npo over 48 hrs  tube on hold   Diet, NPO with Tube Feed:   Vital High Protein    Tube Feeding Modality: Orogastric  Total Volume for 24 Hours (mL): 1440  Continuous  Starting Tube Feed Rate mL per Hour: 60  Until Goal Tube Feed Rate (mL per Hour): 60  Tube Feed Duration (in Hours): 24  Tube Feed Start Time: 13:00 (03-12-18 @ 12:41)      LABS  03-15    142  |  106  |  125<HH>  ----------------------------<  95  5.1<H>   |  21  |  6.6<HH>    Ca    7.6<L>      15 Mar 2018 05:35  Mg     2.4     03-15    TPro  7.1  /  Alb  1.5<L>  /  TBili  1.5<H>  /  DBili  x   /  AST  26  /  ALT  26  /  AlkPhos  49  03-15                          7.7    12.11 )-----------( 58       ( 15 Mar 2018 05:35 )             23.6     ALLERGY:NKDA      Drug Dosing Weight  Height (cm): 176.53 (09 Mar 2018 05:10)  Weight (kg): 74.9 (09 Mar 2018 05:10)  BMI (kg/m2): 24 (09 Mar 2018 05:10)  BSA (m2): 1.91 (09 Mar 2018 05:10)  T(C): 36.8 (03-15-18 @ 04:00), Max: 37.7 (03-14-18 @ 15:00)  HR: 68 (03-15-18 @ 14:00) (63 - 86)  BP: 154/88 (03-15-18 @ 14:00) (102/55 - 154/93)  RR: 32 (03-15-18 @ 14:00) (15 - 32)  SpO2: 100% (03-15-18 @ 07:55) (100% - 100%)      03-14-18 @ 07:01  -  03-15-18 @ 07:00  --------------------------------------------------------  IN: 805.8 mL / OUT: 3404 mL / NET: -2598.2 mL    03-15-18 @ 07:01  -  03-15-18 @ 14:44  --------------------------------------------------------  IN: 295.4 mL / OUT: 92 mL / NET: 203.4 mL        chlorhexidine 0.12% Liquid 15 milliLiter(s) Swish and Spit two times a day  dexmedetomidine Infusion 0.2 MICROgram(s)/kG/Hr IV Continuous <Continuous>  erythromycin     base Tablet 250 milliGRAM(s) Oral every 8 hours  fentaNYL   Infusion 2 MICROgram(s)/kG/Hr IV Continuous <Continuous>  influenza   Vaccine 0.5 milliLiter(s) IntraMuscular once  lactulose Syrup 30 Gram(s) Oral every 4 hours  levoFLOXacin IVPB 250 milliGRAM(s) IV Intermittent every 24 hours  meropenem  IVPB 750 milliGRAM(s) IV Intermittent every 24 hours  metoclopramide Injectable 5 milliGRAM(s) IV Push every 8 hours PRN  micafungin IVPB      micafungin IVPB 100 milliGRAM(s) IV Intermittent every 24 hours  norepinephrine Infusion 0.5 MICROgram(s)/kG/Min IV Continuous <Continuous>  pantoprazole  Injectable 40 milliGRAM(s) IV Push daily  senna 1 Tablet(s) Oral two times a day  vancomycin  IVPB 1250 milliGRAM(s) IV Intermittent every 48 hours      RADIOLOGY STUDIES  < from: Xray Chest 1 View- PORTABLE-Routine (03.15.18 @ 05:01) >  Impression:    Unchanged bilateral opacities.  Support devices as described above.

## 2018-03-15 NOTE — PROGRESS NOTE ADULT - ASSESSMENT
Assessment and Plan:   		  AHRF/ SEVERE PNA/ ADVANCED MM RENAL FAILURE S/P HD, SEVERE ANEMIA NO ACTIVE BLEED, agitated off sedation advanced mm, FOLLOWING SIMPLE COMMANDS    CNS: Sedation vacation AS TOLERATED    PULM: DEC FIO2    CARDIO: I<O    RENAL: NO HD TODAY      HEM ONC: worsening platelets count, no intervention as per Hem onc  send DIC panel  treat as DIC IF bleeding    GI: NPO for now  REPEAT KUB, NUTRITION EVALAUTION  Lactulose 30ml via NG Q4 hr      ID: Cont abx and add Micafungin f/up wash result, ID RECOMMENDATION      Palliative care consult    follow up bronchoscopy cultures  POOR PROGNOSIS, family aware

## 2018-03-15 NOTE — PROGRESS NOTE ADULT - SUBJECTIVE AND OBJECTIVE BOX
67yMale with diagnosis: ANEMIA;UNSPECIFIED TYPE;ACUTE RENAL FAILURE h/o MM      Patient seen, status unchanged.      PHYSICAL EXAM    T(C): , Max: 37.7 (15:00)  T(F): 98.2  HR: 66 (63 - 90)  BP: 134/76 (102/55 - 166/78)  RR: 32 (15 - 32)  SpO2: 100% (100% - 100%)    PT VENTED on 60% O2          LABS:                          7.7    12.11 )-----------( 58       ( 15 Mar 2018 05:35 )             23.6                                                                                      03-15    142  |  106  |  125<HH>  ----------------------------<  95  5.1<H>   |  21  |  6.6<HH>    Ca    7.6<L>      15 Mar 2018 05:35  Mg     2.4     03-15    TPro  7.1  /  Alb  1.5<L>  /  TBili  1.5<H>  /  DBili  x   /  AST  26  /  ALT  26  /  AlkPhos  49  03-15                                                      MEDICATIONS  (STANDING):  chlorhexidine 0.12% Liquid 15 milliLiter(s) Swish and Spit two times a day  dexmedetomidine Infusion 0.2 MICROgram(s)/kG/Hr (3.745 mL/Hr) IV Continuous <Continuous>  erythromycin   IVPB      fentaNYL   Infusion 2 MICROgram(s)/kG/Hr (14.98 mL/Hr) IV Continuous <Continuous>  influenza   Vaccine 0.5 milliLiter(s) IntraMuscular once  lactulose Syrup 30 Gram(s) Oral every 4 hours  levoFLOXacin IVPB 250 milliGRAM(s) IV Intermittent every 24 hours  meropenem  IVPB 750 milliGRAM(s) IV Intermittent every 24 hours  micafungin IVPB      micafungin IVPB 100 milliGRAM(s) IV Intermittent every 24 hours  norepinephrine Infusion 0.5 MICROgram(s)/kG/Min (35.109 mL/Hr) IV Continuous <Continuous>  pantoprazole  Injectable 40 milliGRAM(s) IV Push daily  senna 1 Tablet(s) Oral two times a day  vancomycin  IVPB 1250 milliGRAM(s) IV Intermittent every 24 hours    MEDICATIONS  (PRN):  metoclopramide Injectable 5 milliGRAM(s) IV Push every 8 hours PRN increse GI residual

## 2018-03-16 NOTE — PROGRESS NOTE ADULT - SUBJECTIVE AND OBJECTIVE BOX
OVERNIGHT EVENTS: no events overnight  remained intubated, no pressors. Tolerated OG feeding    Vital Signs Last 24 Hrs  T(C): 36.8 (15 Mar 2018 04:00), Max: 37.7 (14 Mar 2018 15:00)  T(F): 98.2 (15 Mar 2018 04:00), Max: 99.9 (14 Mar 2018 15:00)  HR: 66 (15 Mar 2018 08:00) (63 - 90)  BP: 134/76 (15 Mar 2018 08:00) (102/55 - 166/78)  BP(mean): 94 (15 Mar 2018 08:00) (78 - 113)  RR: 32 (15 Mar 2018 08:00) (15 - 32)  SpO2: 100% (15 Mar 2018 07:55) (100% - 100%)    PHYSICAL EXAMINATION:    GENERAL: follows commands off sedation    HEENT: Head is normocephalic and atraumatic. Extraocular muscles are intact. Mucous membranes are moist.    NECK: Supple.    LUNGS: Clear to auscultation without wheezing, rales or rhonchi; respirations unlabored    HEART: Regular rate and rhythm without murmur.    ABDOMEN: Soft, nontender, and nondistended.      EXTREMITIES: Without any cyanosis, clubbing, rash, lesions or edema.    NEUROLOGIC: follows simple commands    SKIN: No ulceration or induration present.      LABS:                                             7.4    12.33 )-----------( 65       ( 16 Mar 2018 04:58 )             23.4   03-16    145  |  101  |  159<HH>  ----------------------------<  120<H>  6.2<HH>   |  17  |  8.1<HH>    Ca    6.4<L>      16 Mar 2018 04:58  Mg     2.6     03-16    ABG - ( 16 Mar 2018 07:13 )  pH: 7.24  /  pCO2: 41    /  pO2: 114   / HCO3: 17    / Base Excess: -9.4  /  SaO2: 98              VENT: 450 / 32 / 50 / 7.5      MEDICATIONS  (STANDING):  ALBUTerol    90 MICROgram(s) HFA Inhaler 1 Puff(s) Inhalation every 4 hours  ALBUTerol/ipratropium for Nebulization 3 milliLiter(s) Nebulizer every 6 hours  chlorhexidine 0.12% Liquid 15 milliLiter(s) Swish and Spit two times a day  dexmedetomidine Infusion 0.2 MICROgram(s)/kG/Hr (3.745 mL/Hr) IV Continuous <Continuous>  dextrose 50% Injectable 50 milliLiter(s) IV Push once  erythromycin     base Tablet 250 milliGRAM(s) Oral every 8 hours  fentaNYL   Infusion 2 MICROgram(s)/kG/Hr (14.98 mL/Hr) IV Continuous <Continuous>  influenza   Vaccine 0.5 milliLiter(s) IntraMuscular once  insulin regular  human recombinant. 10 Unit(s) SubCutaneous once  lactulose Syrup 30 Gram(s) Oral every 4 hours  levoFLOXacin IVPB 250 milliGRAM(s) IV Intermittent every 24 hours  meropenem  IVPB 750 milliGRAM(s) IV Intermittent every 24 hours  micafungin IVPB      micafungin IVPB 100 milliGRAM(s) IV Intermittent every 24 hours  norepinephrine Infusion 0.5 MICROgram(s)/kG/Min (35.109 mL/Hr) IV Continuous <Continuous>  pantoprazole  Injectable 40 milliGRAM(s) IV Push daily  senna 1 Tablet(s) Oral two times a day  tiotropium 18 MICROgram(s) Capsule 1 Capsule(s) Inhalation daily    MEDICATIONS  (PRN):  metoclopramide Injectable 5 milliGRAM(s) IV Push every 8 hours PRN increse GI residual    RADIOLOGY & ADDITIONAL STUDIES:  et tube ok  improving b/l opacities OVERNIGHT EVENTS: no events overnight  remained intubated, no pressors. Tolerated OG feeding, decreasing FIO2 REQUIREMENT, FAMILY MEETING DONE    Vital Signs Last 24 Hrs  T(C): 36.8 (15 Mar 2018 04:00), Max: 37.7 (14 Mar 2018 15:00)  T(F): 98.2 (15 Mar 2018 04:00), Max: 99.9 (14 Mar 2018 15:00)  HR: 66 (15 Mar 2018 08:00) (63 - 90)  BP: 134/76 (15 Mar 2018 08:00) (102/55 - 166/78)  BP(mean): 94 (15 Mar 2018 08:00) (78 - 113)  RR: 32 (15 Mar 2018 08:00) (15 - 32)  SpO2: 100% (15 Mar 2018 07:55) (100% - 100%)    PHYSICAL EXAMINATION:    GENERAL: follows commands off sedation    HEENT: Head is normocephalic and atraumatic. Extraocular muscles are intact. Mucous membranes are moist.    NECK: Supple.    LUNGS: DEC BS R SIDE    HEART: Regular rate and rhythm without murmur.    ABDOMEN: Soft, nontender, and nondistended.      EXTREMITIES: Without any cyanosis, clubbing, rash, lesions or edema.    NEUROLOGIC: follows simple commands    SKIN: No ulceration or induration present.      LABS:                                             7.4    12.33 )-----------( 65       ( 16 Mar 2018 04:58 )             23.4   03-16    145  |  101  |  159<HH>  ----------------------------<  120<H>  6.2<HH>   |  17  |  8.1<HH>    Ca    6.4<L>      16 Mar 2018 04:58  Mg     2.6     03-16    ABG - ( 16 Mar 2018 07:13 )  pH: 7.24  /  pCO2: 41    /  pO2: 114   / HCO3: 17    / Base Excess: -9.4  /  SaO2: 98              VENT: 450 / 32 / 50 / 7.5      MEDICATIONS  (STANDING):  ALBUTerol    90 MICROgram(s) HFA Inhaler 1 Puff(s) Inhalation every 4 hours  ALBUTerol/ipratropium for Nebulization 3 milliLiter(s) Nebulizer every 6 hours  chlorhexidine 0.12% Liquid 15 milliLiter(s) Swish and Spit two times a day  dexmedetomidine Infusion 0.2 MICROgram(s)/kG/Hr (3.745 mL/Hr) IV Continuous <Continuous>  dextrose 50% Injectable 50 milliLiter(s) IV Push once  erythromycin     base Tablet 250 milliGRAM(s) Oral every 8 hours  fentaNYL   Infusion 2 MICROgram(s)/kG/Hr (14.98 mL/Hr) IV Continuous <Continuous>  influenza   Vaccine 0.5 milliLiter(s) IntraMuscular once  insulin regular  human recombinant. 10 Unit(s) SubCutaneous once  lactulose Syrup 30 Gram(s) Oral every 4 hours  levoFLOXacin IVPB 250 milliGRAM(s) IV Intermittent every 24 hours  meropenem  IVPB 750 milliGRAM(s) IV Intermittent every 24 hours  micafungin IVPB      micafungin IVPB 100 milliGRAM(s) IV Intermittent every 24 hours  norepinephrine Infusion 0.5 MICROgram(s)/kG/Min (35.109 mL/Hr) IV Continuous <Continuous>  pantoprazole  Injectable 40 milliGRAM(s) IV Push daily  senna 1 Tablet(s) Oral two times a day  tiotropium 18 MICROgram(s) Capsule 1 Capsule(s) Inhalation daily    MEDICATIONS  (PRN):  metoclopramide Injectable 5 milliGRAM(s) IV Push every 8 hours PRN increse GI residual    RADIOLOGY & ADDITIONAL STUDIES:  et tube ok  improving b/l opacities

## 2018-03-16 NOTE — PROGRESS NOTE ADULT - ASSESSMENT
Assessment and Plan:   		  AHRF/ SEVERE PNA/ ADVANCED MM RENAL FAILURE S/P HD, SEVERE ANEMIA NO ACTIVE BLEED, agitated off sedation advanced mm, FOLLOWING SIMPLE COMMANDS    CNS: Sedation vacation AS TOLERATED    PULM: DEC FIO2  acidotic but likely due to MADISON   weaning trial today post HD  NO EXTUBATUION    CARDIO: I<O    RENAL: HD today      HEM ONC:  hem onc f up  GI: Cont OG feeding  D/C Lactulose and erythromycin  cont senna and reglan      ID: Cont abx as per  ID RECOMMENDATION      Palliative care / hospice meeting  POOR PROGNOSIS, family aware Assessment and Plan:   		  AHRF/ SEVERE PNA/ ADVANCED MM RENAL FAILURE S/P HD, SEVERE ANEMIA NO ACTIVE BLEED,FOLLOWING SIMPLE COMMANDS    CNS: Sedation vacation AS TOLERATED    PULM: DEC FIO2  acidotic but likely due to MADISON   weaning trial today post HD  NO EXTUBATION YET    CARDIO: I<O    RENAL: HD today      HEM ONC:  hem onc f up  GI: Cont OG feeding  D/C Lactulose and erythromycin  cont senna and reglan      ID: Cont abx as per  ID RECOMMENDATION      Palliative care / hospice meeting  POOR PROGNOSIS  SPOKE AT LENGTH WITH FAMILY

## 2018-03-16 NOTE — PROGRESS NOTE ADULT - SUBJECTIVE AND OBJECTIVE BOX
pt remains vented, FIO2 50%. Sedated with precedex. (no propofol)  Cont to require levophed.  Abd soft, not signif distended, much better than yesterday. + large brown BM this morning.  Urine output low.  Tm 99.8                     7.4    12.33 )-----------( 67       ( 16 Mar 2018 04:58 )             23.4     145  |  101  |  159<HH>  ----------------------------<  120<H>  6.2<HH>   |  17  |  8.1<HH>    Ca    6.4<L>      16 Mar 2018 04:58   --- corrected Ca ~ 7.6  Mg     2.6     03-16    TPro  6.5  /  Alb  2.0<L>  /  TBili  0.8  /  DBili  x   /  AST  30  /  ALT  22  /  AlkPhos  72  03-16    MEDICATIONS  (STANDING):  ALBUTerol    90 MICROgram(s) HFA Inhaler 1 Puff(s) Inhalation every 4 hours  ALBUTerol/ipratropium for Nebulization 3 milliLiter(s) Nebulizer every 6 hours  chlorhexidine 0.12% Liquid 15 milliLiter(s) Swish and Spit two times a day  dexmedetomidine Infusion 0.2 MICROgram(s)/kG/Hr (3.745 mL/Hr) IV Continuous <Continuous>  fentaNYL   Infusion 2 MICROgram(s)/kG/Hr (14.98 mL/Hr) IV Continuous <Continuous>  influenza   Vaccine 0.5 milliLiter(s) IntraMuscular once  levoFLOXacin IVPB 250 milliGRAM(s) IV Intermittent every 24 hours  meropenem  IVPB 750 milliGRAM(s) IV Intermittent every 24 hours  micafungin IVPB 100 milliGRAM(s) IV Intermittent every 24 hours  norepinephrine Infusion 0.5 MICROgram(s)/kG/Min (35.109 mL/Hr) IV Continuous <Continuous>  pantoprazole  Injectable 40 milliGRAM(s) IV Push daily  senna 1 Tablet(s) Oral two times a day  tiotropium 18 MICROgram(s) Capsule 1 Capsule(s) Inhalation daily  MEDICATIONS  (PRN):  metoclopramide Injectable 5 milliGRAM(s) IV Push every 8 hours PRN increse GI residual  LACTULOSE WAS D/C THIS MORNING.  ERYTHRO D/C THIS MORNING.

## 2018-03-16 NOTE — PROGRESS NOTE ADULT - SUBJECTIVE AND OBJECTIVE BOX
ARLEY COKER  67y, Male      OVERNIGHT EVENTS:    no fevers, on vent.    VITALS:  T(F): 99.4, Max: 99.8 (03-15-18 @ 20:00)  HR: 86  BP: 162/83  RR: 31Vital Signs Last 24 Hrs  T(C): 37.4 (16 Mar 2018 04:00), Max: 37.7 (15 Mar 2018 20:00)  T(F): 99.4 (16 Mar 2018 04:00), Max: 99.8 (15 Mar 2018 20:00)  HR: 86 (16 Mar 2018 06:00) (64 - 88)  BP: 162/83 (16 Mar 2018 06:00) (113/66 - 166/78)  BP(mean): 118 (16 Mar 2018 05:00) (80 - 118)  RR: 31 (16 Mar 2018 06:00) (22 - 35)  SpO2: 100% (16 Mar 2018 06:00) (98% - 100%)    TESTS & MEASUREMENTS:                        7.4    12.33 )-----------( 67       ( 16 Mar 2018 04:58 )             23.4     03-15    142  |  106  |  125<HH>  ----------------------------<  95  5.1<H>   |  21  |  6.6<HH>    Ca    7.6<L>      15 Mar 2018 05:35  Mg     2.4     03-15    TPro  7.1  /  Alb  1.5<L>  /  TBili  1.5<H>  /  DBili  x   /  AST  26  /  ALT  26  /  AlkPhos  49  03-15    LIVER FUNCTIONS - ( 15 Mar 2018 05:35 )  Alb: 1.5 g/dL / Pro: 7.1 g/dL / ALK PHOS: 49 U/L / ALT: 26 U/L / AST: 26 U/L / GGT: x             Culture - Blood (collected 03-13-18 @ 05:45)  Source: .Blood None  Preliminary Report (03-14-18 @ 12:13):    No growth to date.    Culture - Bronchial (collected 03-12-18 @ 17:12)  Source: .Broncial None  Gram Stain (03-13-18 @ 23:00):    Few polymorphonuclear leukocytes per low power field    No squamous epithelial cells per low power field    No organisms seen per oil power field  Final Report (03-15-18 @ 21:08):    No growth at 48 hours    Culture - Fungal, Body Fluid (collected 03-12-18 @ 17:12)  Source: .Body Fluid Bronchial Washing (not pleural fluid)  Preliminary Report (03-13-18 @ 07:28):    Testing in progress    Culture - Blood (collected 03-12-18 @ 16:29)  Source: .Blood None  Preliminary Report (03-14-18 @ 02:01):    No growth to date.    Culture - Blood (collected 03-12-18 @ 11:14)  Source: .Blood None  Preliminary Report (03-14-18 @ 02:01):    No growth to date.    Culture - Blood (collected 03-12-18 @ 11:14)  Source: .Blood None  Preliminary Report (03-14-18 @ 02:01):    No growth to date.    Culture - Blood (collected 03-12-18 @ 05:39)  Source: .Blood None  Preliminary Report (03-13-18 @ 16:01):    No growth to date.    Culture - Urine (collected 03-12-18 @ 00:46)  Source: .Urine Catheterized  Final Report (03-13-18 @ 18:49):    No growth    Culture - Blood (collected 03-09-18 @ 10:21)  Source: .Blood Blood  Final Report (03-14-18 @ 17:00):    No growth at 5 days.            RADIOLOGY & ADDITIONAL TESTS:    ANTIBIOTICS:  erythromycin     base Tablet 250 milliGRAM(s) Oral every 8 hours  levoFLOXacin IVPB 250 milliGRAM(s) IV Intermittent every 24 hours  meropenem  IVPB 750 milliGRAM(s) IV Intermittent every 24 hours  micafungin IVPB      micafungin IVPB 100 milliGRAM(s) IV Intermittent every 24 hours  vancomycin  IVPB 1250 milliGRAM(s) IV Intermittent every 48 hours

## 2018-03-16 NOTE — PROGRESS NOTE ADULT - ASSESSMENT
A 68 yo M with PMH of MM presented to hospital for SOB and weakness.     Severe MADISON on  CKD (unknown baseline Cr., pt has not had blood work >5 yrs)  - remains oliguric, HD dependent  - etiology likely multiple myeloma - related kidney disease + ATN    - pt has high M spike 38% on SPEP, Kappa/lambda ratio 777    - UA with albuminuria 100, please send Urine protein/creat ratio and  UPEP/Urine IF     - diff. dz: cast nephropathy, LCDD, amyloidosis  HD today - 3 hrs, 2K, , , UF 1.5 L    Hypocalcemia - check, phosphorus, iPTH'    Anemia - high ferritin>1000,  - hem/onc f/u  -CELSO as per hem/onc    Sepsis - multifocal PNA, leukopenia   - on Levaquin, Merrem and Micafungin  - c/w ID    Prognosis is guarded    will follow

## 2018-03-16 NOTE — PROGRESS NOTE ADULT - ASSESSMENT
67 year male patient with history of MM diagnosed in 2011, was on trial medication in [facility] for 3years, then did not seek any medical care thereafter presented for SOB, cough , generalized weakness and decreased po intake since 2 weeks.    #Septic Shock  2/2 PNA/ Acute hypoxic respiratory failure  - Intubated 3/9  - c/w Meropenem/Levoquin as per ID/Micrafungin per fellow  - Cultures NGTD  - Flu negative  - G1DD on ECHO  - s/p Bronc 3/12  - BAL NGTD  - Urine legionella negative    #Multiple Myeloma/Renal failure/Hypocalcemia  - Nephro and Hem/onc following  - On HD, new starting this visit  - HD planned for today  - Can start pressors if BP drops  - s/p four days of pulse Dexamethasone 40 MG IV per heme onc  - f/u PTH, Phosp    #Anemia- Multifactorial   - Transfuse <7  - s/p 8U PRBC    # Thrombocytopenia/Elevated PT/PTT 2/2 Sepsis vs DIC/ HIT  - No heparin products  - LE doppler negative for DVT   - D-dimer 1490, Fibrinogen 567, not bleeding  - if bleeding  and PT/PTT>1.5 UPN  can administer 10-15ML of FFP as per heme onc  - Monitor PT/PTT and Fibrinogen if bleeding  - May need a dose of Vitamin K oral  1-2.5mg to prevent K deficiency since on antibiotics    # Not tolerating feeds/ Abd distention  - KUB 3/15 showed gastroparesis w/o obstruction  - Re-called nutrition. Re-started feeds. Tolerating well.  - C/w PO erythromycin at q12h  - d/c Lactulose    DVT ppx- SCD's  Poor prognosis

## 2018-03-16 NOTE — PROGRESS NOTE ADULT - SUBJECTIVE AND OBJECTIVE BOX
[facility]  A  ARLEY COKER  67yMale  [id number]    SURGICAL INTERN PROGRESS NOTE  HPI: 67M with MM admitted with acute hypoxic respiratory failure and acute renal failure    OVERNIGHT EVENTS: No acute events. Patient seen by nutrition who recommended starting trickle feeds.   VITALS: Vital Signs Last 24 Hrs  T(C): 37.4 (16 Mar 2018 04:00), Max: 37.7 (15 Mar 2018 20:00)  T(F): 99.4 (16 Mar 2018 04:00), Max: 99.8 (15 Mar 2018 20:00)  HR: 76 (16 Mar 2018 08:00) (64 - 88)  BP: 125/65 (16 Mar 2018 08:00) (113/66 - 166/78)  BP(mean): 81 (16 Mar 2018 08:00) (80 - 118)  RR: 32 (16 Mar 2018 08:00) (22 - 35)  SpO2: 100% (16 Mar 2018 08:00) (98% - 100%)  NG output- 119/24 hours     PHYSICAL EXAM: Middle-aged male, intubated, sedated, OG tube in place with minimal output. [place] present     MEDICATIONS  (STANDING):  ALBUTerol    90 MICROgram(s) HFA Inhaler 1 Puff(s) Inhalation every 4 hours  ALBUTerol/ipratropium for Nebulization 3 milliLiter(s) Nebulizer every 6 hours  chlorhexidine 0.12% Liquid 15 milliLiter(s) Swish and Spit two times a day  dexmedetomidine Infusion 0.2 MICROgram(s)/kG/Hr (3.745 mL/Hr) IV Continuous <Continuous>  dextrose 50% Injectable 50 milliLiter(s) IV Push once  erythromycin     base Tablet 250 milliGRAM(s) Oral every 8 hours  fentaNYL   Infusion 2 MICROgram(s)/kG/Hr (14.98 mL/Hr) IV Continuous <Continuous>  influenza   Vaccine 0.5 milliLiter(s) IntraMuscular once  insulin regular  human recombinant. 10 Unit(s) SubCutaneous once  lactulose Syrup 30 Gram(s) Oral every 4 hours  levoFLOXacin IVPB 250 milliGRAM(s) IV Intermittent every 24 hours  meropenem  IVPB 750 milliGRAM(s) IV Intermittent every 24 hours  micafungin IVPB      micafungin IVPB 100 milliGRAM(s) IV Intermittent every 24 hours  norepinephrine Infusion 0.5 MICROgram(s)/kG/Min (35.109 mL/Hr) IV Continuous <Continuous>  pantoprazole  Injectable 40 milliGRAM(s) IV Push daily  senna 1 Tablet(s) Oral two times a day  tiotropium 18 MICROgram(s) Capsule 1 Capsule(s) Inhalation daily    MEDICATIONS  (PRN):  metoclopramide Injectable 5 milliGRAM(s) IV Push every 8 hours PRN increse GI residual      LABS:                        7.4    12.33 )-----------( 65       ( 16 Mar 2018 04:58 )             23.4     03-16    145  |  101  |  159<HH>  ----------------------------<  120<H>  6.2<HH>   |  17  |  8.1<HH>    Ca    6.4<L>      16 Mar 2018 04:58  Mg     2.6     03-16    TPro  6.5  /  Alb  2.0<L>  /  TBili  0.8  /  DBili  x   /  AST  30  /  ALT  22  /  AlkPhos  72  03-16    PT/INR - ( 15 Mar 2018 05:35 )   PT: 14.90 sec;   INR: 1.37 ratio         PTT - ( 15 Mar 2018 05:35 )  PTT:45.0 sec  LIVER FUNCTIONS - ( 16 Mar 2018 04:58 )  Alb: 2.0 g/dL / Pro: 6.5 g/dL / ALK PHOS: 72 U/L / ALT: 22 U/L / AST: 30 U/L / GGT: x               ABG - ( 16 Mar 2018 07:13 )  pH: 7.24  /  pCO2: 41    /  pO2: 114   / HCO3: 17    / Base Excess: -9.4  /  SaO2: 98                      RADIOLOGY & ADDITIONAL STUDIES:

## 2018-03-16 NOTE — PROGRESS NOTE ADULT - ASSESSMENT
Multilobar PNA especially on the RUL and also on the left.  CXR markedly better, especially on the left side.  S/P BAL. So far unremarkable.  Urine for legionella negative  Vanco 1250mg post HD.  Levo 250 mg iv q24h.  Meropenem 750 mg iv q24h  D/C vanco after next dose.

## 2018-03-16 NOTE — PROGRESS NOTE ADULT - ASSESSMENT
ASSESSMENT:  pt had issues with feeding intolerance, poor gastric emptying, and no BMs. pt had large no BM today, abd less distended - Lactulose now d/c.  However, issues with gastric emptying reportedly did not respond to Reglan, but did improve with tx with erythro started yesterday. Would NOT just stop it and allow prn Reglan.  Suggest cont erythro, but decrease frequency of dosing x next 24 h and then re-evaluate. Remember that because of this issue pt was unfed for some time.  Cont Peptamen AF at 65 ml/h.  For HD today, per Nephrology.

## 2018-03-16 NOTE — PROGRESS NOTE ADULT - ASSESSMENT
67M with MM, respiratory and renal failure  -plan to start trickle feeds  -will trach patient when needed, please recall PRN

## 2018-03-16 NOTE — PROGRESS NOTE ADULT - SUBJECTIVE AND OBJECTIVE BOX
Nephrology progress note    Patient is seen and examined, events over the last 24 h noted .  Remains on vent 50% FiO2%. Minimal urine output.   Allergies:  No Known Allergies    Hospital Medications:   MEDICATIONS  (STANDING):  ALBUTerol    90 MICROgram(s) HFA Inhaler 1 Puff(s) Inhalation every 4 hours  ALBUTerol/ipratropium for Nebulization 3 milliLiter(s) Nebulizer every 6 hours  chlorhexidine 0.12% Liquid 15 milliLiter(s) Swish and Spit two times a day  dexmedetomidine Infusion 0.2 MICROgram(s)/kG/Hr (3.745 mL/Hr) IV Continuous <Continuous>  dextrose 50% Injectable 50 milliLiter(s) IV Push once  fentaNYL   Infusion 2 MICROgram(s)/kG/Hr (14.98 mL/Hr) IV Continuous <Continuous>  influenza   Vaccine 0.5 milliLiter(s) IntraMuscular once  insulin regular  human recombinant. 10 Unit(s) SubCutaneous once  levoFLOXacin IVPB 250 milliGRAM(s) IV Intermittent every 24 hours  meropenem  IVPB 750 milliGRAM(s) IV Intermittent every 24 hours  micafungin IVPB      micafungin IVPB 100 milliGRAM(s) IV Intermittent every 24 hours  norepinephrine Infusion 0.5 MICROgram(s)/kG/Min (35.109 mL/Hr) IV Continuous <Continuous>  pantoprazole  Injectable 40 milliGRAM(s) IV Push daily  senna 1 Tablet(s) Oral two times a day  tiotropium 18 MICROgram(s) Capsule 1 Capsule(s) Inhalation daily        VITALS:  T(F): 99.4 (18 @ 04:00), Max: 99.8 (03-15-18 @ 20:00)  HR: 76 (18 @ 08:00)  BP: 125/65 (18 @ 08:00)  RR: 32 (18 @ 08:00)  SpO2: 100% (18 @ 08:00)  Wt(kg): --     @ 07:01  -  03-15 @ 07:00  --------------------------------------------------------  IN: 805.8 mL / OUT: 3404 mL / NET: -2598.2 mL    03-15 @ 07:01  -   @ 07:00  --------------------------------------------------------  IN: 1852.5 mL / OUT: 291 mL / NET: 1561.5 mL      Height (cm): 176.53 (03-15 @ 16:44)  Weight (kg): 74.9 (03-15 @ 16:44)  BMI (kg/m2): 24 (03-15 @ 16:44)  BSA (m2): 1.91 (03-15 @ :44)    PHYSICAL EXAM:  Constitutional: NAD. On vent 50% FiO2  Respiratory: CTAB  Cardiovascular: S1, S2, RRR  Gastrointestinal: BS+, soft, NT/ND  Extremities: No cyanosis or clubbing. No peripheral edema  Neurological: calm  :  alva +.   Vascular Access: Udol cath    LABS:      145  |  101  |  159<HH>  ----------------------------<  120<H>  6.2<HH>   |  17  |  8.1<HH>    Ca    6.4<L>      16 Mar 2018 04:58  Mg     2.6         TPro  6.5  /  Alb  2.0<L>  /  TBili  0.8  /  DBili      /  AST  30  /  ALT  22  /  AlkPhos  72                            7.4    12.33 )-----------( 65       ( 16 Mar 2018 04:58 )             23.4       Urine Studies:  Urinalysis Basic - ( 12 Mar 2018 00:47 )    Color: Red / Appearance: Turbid / S.025 / pH:   Gluc:  / Ketone: Negative  / Bili: Small / Urobili: 0.2 mg/dL   Blood:  / Protein: 30 mg/dL / Nitrite: Negative   Leuk Esterase: Trace / RBC:  / WBC 6-10 /HPF   Sq Epi:  / Non Sq Epi: Moderate /HPF / Bacteria: Many /HPF        RADIOLOGY & ADDITIONAL STUDIES:  < from: Xray Chest 1 View-PORTABLE IMMEDIATE (03.15.18 @ 15:48) >  Stable bilateral lung opacities right greater than left    < end of copied text >  < from: CT Abdomen and Pelvis No Cont (18 @ 00:54) >  KIDNEYS: No hydronephrosis. Right renal nonobstructing stones measuring   up to 9 mm.    < end of copied text >

## 2018-03-16 NOTE — PROGRESS NOTE ADULT - SUBJECTIVE AND OBJECTIVE BOX
PGY I NOTE    LENGTH OF HOSPITAL STAY:  7d    Pt seen and examined at bedside. Intubated and sedated. On 50% 02- improved. HD today. s/p family meeting with Pulm attending discussing guarded prognosis and plan of care    CHIEF COMPLAINT: Patient is a 67y old  Male who presents with a chief complaint of sob, weakness (09 Mar 2018 05:52)    HPI:HPI:  68 y/o M PMH Multiple Myeloma, Anemia who presents with weakness, SOB.  Patient had a dry cough and felt like he was sick for the past 2 weeks and this worsened over the past 2 days.  Patient is unable to walk without being extremely dyspneic , dyspnea gradually progressed to be at rest. Patient had chills in the last 2 days Patient is weak and not eating and not drinking well. Patient also has bone pain in his right flank.  Patient has not seen a doctor in numerous years and is not on medication.  Family felt he looked pale and weak. Patient denies any hematuria, melena or hematochezia. Patient is on iron tablets since 5 years, since then his stool is dark, with no recent change. Patient denies any alteration in bowl movements no nausea or vomiting, but there is decrease in PO intake. In the ER patient received one unit of blood , when he was received the second unit, he spiked a tem of 38.5, no change in hemodynamics, work up for transfusion reaction is sent. Patient was not taking medication or following up with any doctor cause he believes in herbal medicine only. (09 Mar 2018 05:52)    OVERNIGHT EVENTS/INTERVAL UPDATES:    PMH & PSH  PAST MEDICAL & SURGICAL HISTORY:  Anemia, unspecified type  Multiple myeloma, remission status unspecified    SOCIAL HISTORY: Negative    ALLERGIES: No Known Allergies    HOME MEDICATIONS  Home Medications:    PHYSICAL EXAM:  T(F): 99.4 (03-16-18 @ 04:00), Max: 99.8 (03-15-18 @ 20:00)  HR: 74 (03-16-18 @ 10:00)  BP: 137/66 (03-16-18 @ 10:00)  RR: 32 (03-16-18 @ 10:00)  SpO2: 100% (03-16-18 @ 10:00)  CAPILLARY BLOOD GLUCOSE          03-15-18 @ 07:01  -  03-16-18 @ 07:00  --------------------------------------------------------  IN:    dexmedetomidine Infusion: 259.9 mL    Enteral Tube Flush: 230 mL    fentaNYL  Infusion: 357.6 mL    IV PiggyBack: 200 mL    Peptamen A.F.: 805 mL  Total IN: 1852.5 mL    OUT:    Indwelling Catheter - Urethral: 291 mL  Total OUT: 291 mL    Total NET: 1561.5 mL        Mode: AC/ CMV (Assist Control/ Continuous Mandatory Ventilation), RR (machine): 32, TV (machine): 450, FiO2: 40, PEEP: 7.5, ITime: 1, MAP: 15, PIP: 28    General: Intubared, sedated  HEENT: NCAT  CV: RRR  RESP: Decreased BS, B/L rhochi  Abdominal: Soft, NTTP  Extremity: no c/c/e  Neuro: A&O x3    MEDICATIONS  STANDING MEDICATIONS  ALBUTerol    90 MICROgram(s) HFA Inhaler 1 Puff(s) Inhalation every 4 hours  ALBUTerol/ipratropium for Nebulization 3 milliLiter(s) Nebulizer every 6 hours  chlorhexidine 0.12% Liquid 15 milliLiter(s) Swish and Spit two times a day  dexmedetomidine Infusion 0.2 MICROgram(s)/kG/Hr IV Continuous <Continuous>  erythromycin     base Tablet 250 milliGRAM(s) Oral every 12 hours  fentaNYL   Infusion 2 MICROgram(s)/kG/Hr IV Continuous <Continuous>  influenza   Vaccine 0.5 milliLiter(s) IntraMuscular once  levoFLOXacin IVPB 250 milliGRAM(s) IV Intermittent every 24 hours  meropenem  IVPB 750 milliGRAM(s) IV Intermittent every 24 hours  micafungin IVPB      micafungin IVPB 100 milliGRAM(s) IV Intermittent every 24 hours  norepinephrine Infusion 0.5 MICROgram(s)/kG/Min IV Continuous <Continuous>  pantoprazole  Injectable 40 milliGRAM(s) IV Push daily  senna 1 Tablet(s) Oral two times a day  tiotropium 18 MICROgram(s) Capsule 1 Capsule(s) Inhalation daily    PRN MEDICATIONS  metoclopramide Injectable 5 milliGRAM(s) IV Push every 8 hours PRN    LABS:                        7.4    12.33 )-----------( 65       ( 16 Mar 2018 04:58 )             23.4              03-16    145  |  101  |  159<HH>  ----------------------------<  120<H>  6.2<HH>   |  17  |  8.1<HH>    Ca    6.4<L>      16 Mar 2018 04:58  Mg     2.6     03-16    TPro  6.5  /  Alb  2.0<L>  /  TBili  0.8  /  DBili  x   /  AST  30  /  ALT  22  /  AlkPhos  72  03-16    LIVER FUNCTIONS - ( 16 Mar 2018 04:58 )  Alb: 2.0 g/dL / Pro: 6.5 g/dL / ALK PHOS: 72 U/L / ALT: 22 U/L / AST: 30 U/L / GGT: x                      PT/INR - ( 15 Mar 2018 05:35 )   PT: 14.90 sec;   INR: 1.37 ratio         PTT - ( 15 Mar 2018 05:35 )  PTT:45.0 sec                  ABG - ( 16 Mar 2018 07:13 )  pH: 7.24  /  pCO2: 41    /  pO2: 114   / HCO3: 17    / Base Excess: -9.4  /  SaO2: 98                    IMAGING:      ASSESSMENT & PLANS:    CNS:    HEENT:    PULMONARY:    CARIOVASCULAR:    GI:    RENAL:    INFECTIOUS DISEASE:    HEMATOLOGICAL:    ENDOCRINE:    MUSCULOSKELETAL:

## 2018-03-17 NOTE — PROGRESS NOTE ADULT - ASSESSMENT
67 year male patient with history of MM diagnosed in 2011, was on trial medication in [facility] for 3years, then did not seek any medical care thereafter presented for SOB, cough , generalized weakness and decreased po intake since 2 weeks.    #Septic Shock  2/2 PNA/ Acute hypoxic respiratory failure  - Intubated 3/9  - CXR unchanged; bilateral opacities.  - c/w Meropenem/Levoquin as per ID and Micafungin per fellow  - Cultures NGTD  - Flu negative  - G1DD on ECHO  - s/p Bronc 3/12  - BAL NGTD  - Urine legionella negative    #Multiple Myeloma/Renal failure/Hypocalcemia  - Nephro and Hem/onc following  - Prior HD, -800 with 250 bolus.  - HD planned again for today  - Pressors if BP drops  - Ca supplement and Vit D for low Ca  - s/p four days of pulse Dexamethasone 40 MG IV per heme onc  - f/u PTH. Phosp - 8.1    #Anemia- Multifactorial   - Transfuse <7  - Transfuse 1 unit PRBC today in HD; f/u CBC in AM  - s/p 8U PRBC    # Thrombocytopenia/Elevated PT/PTT 2/2 Sepsis vs DIC/ HIT  - No overt bleeding  - No heparin products  - LE doppler negative for DVT   - D-dimer 1490, Fibrinogen 567, not bleeding  - if bleeding  and PT/PTT>1.5 UPN  can administer 10-15ML of FFP as per heme onc  - Monitor PT/PTT and Fibrinogen if bleeding  - May need a dose of Vitamin K oral  1-2.5mg to prevent K deficiency since on antibiotics    #GI  - KUB 3/15 showed gastroparesis w/o obstruction  - Re-called nutrition. Tolerating TF.  - C/w PO erythromycin at q12h    DVT ppx- SCD's  Poor prognosis

## 2018-03-17 NOTE — PROGRESS NOTE ADULT - ASSESSMENT
Assessment and Plan:   		  AHRF/ SEVERE PNA/ ADVANCED MM RENAL FAILURE S/P HD, SEVERE ANEMIA NO ACTIVE BLEED,FOLLOWING SIMPLE COMMANDS    CNS: Sedation vacation AS TOLERATED    PULM: DEC FIO2  acidotic but likely due to MADISON   weaning trial today post HD  NO EXTUBATION YET    CARDIO: I<O    RENAL: HD today      HEM ONC:  hem onc f up  GI: Cont OG feeding  D/C Lactulose and erythromycin  cont senna and reglan      ID: Cont abx as per  ID RECOMMENDATION      Palliative care / hospice meeting  POOR PROGNOSIS  SPOKE AT LENGTH WITH FAMILY

## 2018-03-17 NOTE — PROGRESS NOTE ADULT - SUBJECTIVE AND OBJECTIVE BOX
Over Night Events:  none        ROS:  See HPI    PHYSICAL EXAM    ICU Vital Signs Last 24 Hrs  T(C): 36.9 (17 Mar 2018 08:00), Max: 37.6 (16 Mar 2018 12:00)  T(F): 98.4 (17 Mar 2018 08:00), Max: 99.6 (16 Mar 2018 12:00)  HR: 74 (17 Mar 2018 08:00) (58 - 90)  BP: 154/82 (17 Mar 2018 09:00) (90/51 - 160/80)  BP(mean): 117 (17 Mar 2018 09:00) (60 - 117)  ABP: --  ABP(mean): --  RR: 28 (17 Mar 2018 09:00) (26 - 34)  SpO2: 100% (17 Mar 2018 09:00) (97% - 100%)        03-16-18 @ 07:01  -  03-17-18 @ 07:00  --------------------------------------------------------  IN: 3380 mL / OUT: 261 mL / NET: 3119 mL    03-17-18 @ 07:01  -  03-17-18 @ 09:26  --------------------------------------------------------  IN: 93.6 mL / OUT: 20 mL / NET: 73.6 mL        General:  HEENT:                Lymph Nodes: NO cervical LN   Lungs: Bilateral BS  Cardiovascular: Regular   Abdomen: Soft, Positive BS  Extremities: No clubbing   Skin: none  Neurological: off sedation a/a      LABS:                          6.1    7.35  )-----------( 56       ( 17 Mar 2018 05:34 )             19.3                                               03-17    135  |  93<L>  |  >112<HH>  ----------------------------<  101  5.4<H>   |  22  |  7.2<HH>    Ca    6.5<L>      17 Mar 2018 05:34  Phos  8.1     03-17  Mg     2.3     03-17    TPro  5.6<L>  /  Alb  1.7<L>  /  TBili  0.6  /  DBili  x   /  AST  19  /  ALT  15  /  AlkPhos  57  03-17                                                                                           LIVER FUNCTIONS - ( 17 Mar 2018 05:34 )  Alb: 1.7 g/dL / Pro: 5.6 g/dL / ALK PHOS: 57 U/L / ALT: 15 U/L / AST: 19 U/L / GGT: x                                                                                               Mode: AC/ CMV (Assist Control/ Continuous Mandatory Ventilation)  RR (machine): 32  TV (machine): 450  FiO2: 40  PEEP: 7.5  ITime: 1  MAP: 18  PIP: 36                                      ABG - ( 17 Mar 2018 08:01 )  pH: 7.36  /  pCO2: 39    /  pO2: 96    / HCO3: 22    / Base Excess: -3.4  /  SaO2: 98                  MEDICATIONS  (STANDING):  ALBUTerol    90 MICROgram(s) HFA Inhaler 1 Puff(s) Inhalation every 6 hours  chlorhexidine 0.12% Liquid 15 milliLiter(s) Swish and Spit two times a day  dexmedetomidine Infusion 0.2 MICROgram(s)/kG/Hr (3.745 mL/Hr) IV Continuous <Continuous>  erythromycin     base Tablet 250 milliGRAM(s) Oral every 12 hours  fentaNYL   Infusion 2 MICROgram(s)/kG/Hr (14.98 mL/Hr) IV Continuous <Continuous>  influenza   Vaccine 0.5 milliLiter(s) IntraMuscular once  ipratropium 17 MICROgram(s) HFA Inhaler 1 Puff(s) Inhalation every 6 hours  levoFLOXacin IVPB 250 milliGRAM(s) IV Intermittent every 24 hours  meropenem  IVPB 750 milliGRAM(s) IV Intermittent every 24 hours  micafungin IVPB      micafungin IVPB 100 milliGRAM(s) IV Intermittent every 24 hours  norepinephrine Infusion 0.5 MICROgram(s)/kG/Min (35.109 mL/Hr) IV Continuous <Continuous>  pantoprazole  Injectable 40 milliGRAM(s) IV Push daily  senna 1 Tablet(s) Oral two times a day    MEDICATIONS  (PRN):  metoclopramide Injectable 5 milliGRAM(s) IV Push every 8 hours PRN increse GI residual      Xrays: improved right lung infiltrate                                                                                    ECHO

## 2018-03-17 NOTE — PROGRESS NOTE ADULT - SUBJECTIVE AND OBJECTIVE BOX
SUBJECTIVE:    Patient is a 67y old Male who presents with a chief complaint of sob, weakness (09 Mar 2018 05:52)    Currently admitted to medicine with the primary diagnosis of Anemia, unspecified type     Today is hospital day 8d. Pt intubated and sedated today. Following commands during sedation vacation.     PAST MEDICAL & SURGICAL HISTORY  Anemia, unspecified type  Multiple myeloma, remission status unspecified    SOCIAL HISTORY:  Negative for smoking/alcohol/drug use.     ALLERGIES:  No Known Allergies    MEDICATIONS:  STANDING MEDICATIONS  ALBUTerol    90 MICROgram(s) HFA Inhaler 1 Puff(s) Inhalation every 6 hours  calcium carbonate 1250 mG + Vitamin D (OsCal 500 + D) 1 Tablet(s) Oral two times a day  chlorhexidine 0.12% Liquid 15 milliLiter(s) Swish and Spit two times a day  dexmedetomidine Infusion 0.2 MICROgram(s)/kG/Hr IV Continuous <Continuous>  erythromycin     base Tablet 250 milliGRAM(s) Oral every 12 hours  fentaNYL   Infusion 2 MICROgram(s)/kG/Hr IV Continuous <Continuous>  influenza   Vaccine 0.5 milliLiter(s) IntraMuscular once  ipratropium 17 MICROgram(s) HFA Inhaler 1 Puff(s) Inhalation every 6 hours  levoFLOXacin IVPB 250 milliGRAM(s) IV Intermittent every 24 hours  meropenem  IVPB 750 milliGRAM(s) IV Intermittent every 24 hours  micafungin IVPB      micafungin IVPB 100 milliGRAM(s) IV Intermittent every 24 hours  norepinephrine Infusion 0.5 MICROgram(s)/kG/Min IV Continuous <Continuous>  pantoprazole  Injectable 40 milliGRAM(s) IV Push daily  senna 1 Tablet(s) Oral two times a day    PRN MEDICATIONS  metoclopramide Injectable 5 milliGRAM(s) IV Push every 8 hours PRN    VITALS:   T(F): 98.4  HR: 74  BP: 154/82  RR: 28  SpO2: 100%    LABS:                        6.1    7.35  )-----------( 56       ( 17 Mar 2018 05:34 )             19.3     03-17    135  |  93<L>  |  >112<HH>  ----------------------------<  101  5.4<H>   |  22  |  7.2<HH>    Ca    6.5<L>      17 Mar 2018 05:34  Phos  8.1     03-17  Mg     2.3     03-17    TPro  5.6<L>  /  Alb  1.7<L>  /  TBili  0.6  /  DBili  x   /  AST  19  /  ALT  15  /  AlkPhos  57  03-17  Phosphorus Level, Serum: 8.1 mg/dL (03.17.18 @ 05:34)    ABG - ( 17 Mar 2018 08:01 )  pH: 7.36  /  pCO2: 39    /  pO2: 96    / HCO3: 22    / Base Excess: -3.4  /  SaO2: 98        RADIOLOGY:    < from: Xray Chest 1 View- PORTABLE-Routine (03.17.18 @ 05:02) >  Bilateral opacities, unchanged. Support devices in place.      PHYSICAL EXAM:  GEN: No acute distress, intubated   HEENT: NC/AT, eyes open to voice  LUNGS: Clear to auscultation bilaterally   HEART: S1/S2 present. RRR.   ABD: Soft, non-tender, non-distended. Bowel sounds present  EXT: no lower extremity edema  NEURO: YOAN -1, follows commands. Blinks spontaneously. brisk reflexes biceps, wrist extensors knee extensor

## 2018-03-17 NOTE — PROGRESS NOTE ADULT - ASSESSMENT
A 68 yo M with PMH of MM presented to hospital for SOB and weakness.     Severe MADISON on  CKD (unknown baseline Cr., pt has not had blood work >5 yrs)  - remains oliguric, HD dependent, s/p hd yesterday with 800 cc fluid removal  - HD again today, in view of hyperaklemia, increased BUN   - etiology likely multiple myeloma - related kidney disease + ATN          Hypocalcemia : corrected : around 8, ph noted, start phoslo 2/2/2    Anemia - high ferritin>1000,  - hem/onc f/u  -CELSO as per hem/onc  - no venofer     Sepsis - multifocal PNA, leukopenia: remains on ATB       Prognosis is guarded    will follow

## 2018-03-17 NOTE — PROGRESS NOTE ADULT - SUBJECTIVE AND OBJECTIVE BOX
seen and examined  remains intubated/ventilated  no pressors      Standing Inpatient Medications  ALBUTerol    90 MICROgram(s) HFA Inhaler 1 Puff(s) Inhalation every 6 hours  chlorhexidine 0.12% Liquid 15 milliLiter(s) Swish and Spit two times a day  dexmedetomidine Infusion 0.2 MICROgram(s)/kG/Hr IV Continuous <Continuous>  erythromycin     base Tablet 250 milliGRAM(s) Oral every 12 hours  fentaNYL   Infusion 2 MICROgram(s)/kG/Hr IV Continuous <Continuous>  influenza   Vaccine 0.5 milliLiter(s) IntraMuscular once  ipratropium 17 MICROgram(s) HFA Inhaler 1 Puff(s) Inhalation every 6 hours  levoFLOXacin IVPB 250 milliGRAM(s) IV Intermittent every 24 hours  meropenem  IVPB 750 milliGRAM(s) IV Intermittent every 24 hours  micafungin IVPB      micafungin IVPB 100 milliGRAM(s) IV Intermittent every 24 hours  norepinephrine Infusion 0.5 MICROgram(s)/kG/Min IV Continuous <Continuous>  pantoprazole  Injectable 40 milliGRAM(s) IV Push daily  senna 1 Tablet(s) Oral two times a day          VITALS/PHYSICAL EXAM  --------------------------------------------------------------------------------  T(C): 37.1 (03-17-18 @ 04:00), Max: 37.6 (03-16-18 @ 12:00)  HR: 80 (03-17-18 @ 07:00) (58 - 90)  BP: 133/65 (03-17-18 @ 07:00) (90/51 - 160/80)  RR: 32 (03-17-18 @ 07:00) (26 - 32)  SpO2: 98% (03-17-18 @ 07:00) (97% - 100%)  Wt(kg): --  Height (cm): 176.53 (03-15-18 @ 16:44)  Weight (kg): 74.9 (03-15-18 @ 16:44)  BMI (kg/m2): 24 (03-15-18 @ 16:44)  BSA (m2): 1.91 (03-15-18 @ 16:44)      03-16-18 @ 07:01  -  03-17-18 @ 07:00  --------------------------------------------------------  IN: 3380 mL / OUT: 261 mL / NET: 3119 mL      Physical Exam:  	Gen: inrubated/ventilated  	Pulm: B/L nory   	CV:  S1S2; no rub  	Abd: distended  	: nida  	LE:  no edema  	Vascular access: femoral udall     LABS/STUDIES  --------------------------------------------------------------------------------              7.4    12.33 >-----------<  65       [03-16-18 @ 04:58]              23.4     135  |  93  |  >112  ----------------------------<  101      [03-17-18 @ 05:34]  5.4   |  22  |  7.2        Ca     6.5     [03-17-18 @ 05:34]      Mg     2.3     [03-17-18 @ 05:34]      Phos  8.1     [03-17-18 @ 05:34]    TPro  5.6  /  Alb  1.7  /  TBili  0.6  /  DBili  x   /  AST  19  /  ALT  15  /  AlkPhos  57  [03-17-18 @ 05:34]          Creatinine Trend:  SCr 7.2 [03-17 @ 05:34]  SCr 8.1 [03-16 @ 04:58]  SCr 6.6 [03-15 @ 05:35]  SCr 7.0 [03-14 @ 05:19]  SCr 9.3 [03-13 @ 05:45]      Iron 28, TIBC 135, %sat 21      [03-09-18 @ 11:03]  Ferritin 1559      [03-09-18 @ 11:03]    HBsAb Nonreact      [03-09-18 @ 20:05]  HBsAg Nonreact      [03-09-18 @ 20:05]  HBcAb Nonreact      [03-09-18 @ 20:05]  HCV 0.03, Nonreact      [03-09-18 @ 20:05]    Free Light Chains: kappa 311.00, lambda 0.40, ratio = 777.50      [03-09 @ 17:13]  Immunofixation Serum:   Two IgA Kappa bands Identified    Reference Range: None Detected      [03-09-18 @ 17:13]  SPEP Interpretation: Two Beta-Migrating Paraproteins Identified      [03-09-18 @ 17:13]

## 2018-03-18 NOTE — PROGRESS NOTE ADULT - SUBJECTIVE AND OBJECTIVE BOX
Over Night Events:  needs increased sedaton        ROS:  See HPI    PHYSICAL EXAM    ICU Vital Signs Last 24 Hrs  T(C): 37.1 (18 Mar 2018 08:00), Max: 37.4 (17 Mar 2018 16:00)  T(F): 98.7 (18 Mar 2018 08:00), Max: 99.4 (17 Mar 2018 16:00)  HR: 76 (18 Mar 2018 08:00) (64 - 94)  BP: 96/53 (18 Mar 2018 08:00) (87/52 - 174/87)  BP(mean): 66 (18 Mar 2018 08:00) (60 - 114)  ABP: --  ABP(mean): --  RR: 32 (18 Mar 2018 08:00) (22 - 32)  SpO2: 99% (18 Mar 2018 08:00) (94% - 100%)        03-17-18 @ 07:01  -  03-18-18 @ 07:00  --------------------------------------------------------  IN: 3025.1 mL / OUT: 825 mL / NET: 2200.1 mL    03-18-18 @ 07:01  -  03-18-18 @ 09:35  --------------------------------------------------------  IN: 96.9 mL / OUT: 30 mL / NET: 66.9 mL        General:  HEENT:                Lymph Nodes: NO cervical LN   Lungs: Bilateral BS  Cardiovascular: Regular   Abdomen: Soft, Positive BS  Extremities: No clubbing   Skin:   Neurological: a/a      LABS:                          6.8    9.50  )-----------( 66       ( 18 Mar 2018 05:52 )             21.1                                               03-18    139  |  98  |  126<HH>  ----------------------------<  106  5.8<H>   |  24  |  6.9<HH>    Ca    6.9<L>      18 Mar 2018 05:52  Phos  8.1     03-17  Mg     2.2     03-18    TPro  6.1  /  Alb  1.8<L>  /  TBili  0.6  /  DBili  x   /  AST  20  /  ALT  13  /  AlkPhos  67  03-18                                                                                           LIVER FUNCTIONS - ( 18 Mar 2018 05:52 )  Alb: 1.8 g/dL / Pro: 6.1 g/dL / ALK PHOS: 67 U/L / ALT: 13 U/L / AST: 20 U/L / GGT: x                                                                                               Mode: AC/ CMV (Assist Control/ Continuous Mandatory Ventilation)  RR (machine): 32  TV (machine): 450  FiO2: 40  PEEP: 7.5  ITime: 1  MAP: 16  PIP: 26                                      ABG - ( 18 Mar 2018 07:12 )  pH: 7.36  /  pCO2: 41    /  pO2: 69    / HCO3: 23    / Base Excess: -2.2  /  SaO2: 93                  MEDICATIONS  (STANDING):  ALBUTerol    90 MICROgram(s) HFA Inhaler 1 Puff(s) Inhalation every 6 hours  calcium acetate 667 milliGRAM(s) Oral three times a day with meals  calcium carbonate 1250 mG + Vitamin D (OsCal 500 + D) 1 Tablet(s) Oral two times a day  chlorhexidine 0.12% Liquid 15 milliLiter(s) Swish and Spit two times a day  dexmedetomidine Infusion 0.2 MICROgram(s)/kG/Hr (3.745 mL/Hr) IV Continuous <Continuous>  erythromycin     base Tablet 250 milliGRAM(s) Oral every 12 hours  fentaNYL   Infusion 2 MICROgram(s)/kG/Hr (14.98 mL/Hr) IV Continuous <Continuous>  influenza   Vaccine 0.5 milliLiter(s) IntraMuscular once  ipratropium 17 MICROgram(s) HFA Inhaler 1 Puff(s) Inhalation every 6 hours  levoFLOXacin IVPB 250 milliGRAM(s) IV Intermittent every 24 hours  meropenem  IVPB 750 milliGRAM(s) IV Intermittent every 24 hours  micafungin IVPB      micafungin IVPB 100 milliGRAM(s) IV Intermittent every 24 hours  norepinephrine Infusion 0.5 MICROgram(s)/kG/Min (35.109 mL/Hr) IV Continuous <Continuous>  pantoprazole  Injectable 40 milliGRAM(s) IV Push daily  senna 1 Tablet(s) Oral two times a day    MEDICATIONS  (PRN):      Xrays:     unchanged                                                                                ECHO

## 2018-03-18 NOTE — PROGRESS NOTE ADULT - ASSESSMENT
MADISON on CKD (unknown baseline)/ hypocalcemia/ sepsis/ respiratory failure on vent     - oliguric;HD dependent, s/p hd yesterday   - check BMP this afternoon, if K trending up, may need HD  - keep MAP >65  - c/w calcium carbonate   - Anemia: iron study noted; 1 UINT PRBC transfused   - sepsis: f/u ID; c/w antibiotics   - Prognosis is guarded    will follow

## 2018-03-18 NOTE — PROVIDER CONTACT NOTE (OTHER) - ASSESSMENT
pt abdomen is distended, firm, bowel sounds presents, and is actively passing flatus. OGT assessed and residual was greater than 240cc. Md Pineda notified and stat dose of Lactulose ordered.
w/ no s/s n/v.

## 2018-03-18 NOTE — PROGRESS NOTE ADULT - ASSESSMENT
67 year male patient with history of MM diagnosed in 2011, was on trial medication in [facility] for 3years, then did not seek any medical care thereafter presented for SOB, cough , generalized weakness and decreased po intake since 2 weeks.    #Septic Shock  2/2 PNA/ Acute hypoxic respiratory failure  - Intubated 3/9  - CXR unchanged; bilateral opacities.  - c/w Meropenem/Levoquin as per ID, d/c Micafungin per attending  - Cultures NGTD  - Flu negative  - G1DD on ECHO  - s/p Bronc 3/12  - BAL NGTD  - Urine legionella negative    #Multiple Myeloma/Renal failure/Hypocalcemia  - Nephro and Hem/onc following  - HD not planned for today  - Pressors if BP drops  - Ca supplement and Vit D for low Ca  - s/p four days of pulse Dexamethasone 40 MG IV per heme onc    # Hyperkalemia  - Insulin/D50 and pm BMP    #Anemia- Multifactorial   - Transfuse <7  - 1U PRBC today  - s/p 9U PRBC    # Thrombocytopenia/Elevated PT/PTT 2/2 Sepsis vs DIC/ HIT  - No overt bleeding  - No heparin products  - LE doppler negative for DVT   - if bleeding  and PT/PTT>1.5 UPN  can administer 10-15ML of FFP as per heme onc  - Monitor PT/PTT and Fibrinogen if bleeding  - May need a dose of Vitamin K oral  1-2.5mg to prevent K deficiency since on antibiotics    #GI  - C/w PO erythromycin at q12h    DVT ppx- SCD's  Poor prognosis

## 2018-03-18 NOTE — PROGRESS NOTE ADULT - ASSESSMENT
Assessment and Plan:   		  AHRF/ SEVERE PNA/ ADVANCED MM RENAL FAILURE S/P HD, SEVERE ANEMIA NO ACTIVE BLEED,FOLLOWING SIMPLE COMMANDS    CNS: Sedation vacation AS TOLERATED    PULM: DEC FIO2  acidotic but likely due to MADISON   weaning trial today post HD  NO EXTUBATION YET    CARDIO: I<O    RENAL: HD today      HEM ONC:  hem onc f up  D?C Mycofungin  GI: Cont OG feeding  D/C Lactulose and erythromycin  cont senna and reglan      ID: Cont abx as per  ID RECOMMENDATION      Palliative care / hospice meeting  POOR PROGNOSIS  SPOKE AT LENGTH WITH FAMILY

## 2018-03-18 NOTE — PROVIDER CONTACT NOTE (OTHER) - ACTION/TREATMENT ORDERED:
Respiratory to be called to advance tube
stat dose lactulose ordered and given. md stated to wait and reassess and if no bm then enema will be next recommendation.
rate decreased to 40 ml/hr as per md. will recheck in 4hrs.

## 2018-03-18 NOTE — PROGRESS NOTE ADULT - SUBJECTIVE AND OBJECTIVE BOX
Nephrology progress note    Patient is seen and examined, events over the last 24 h noted .    Allergies:  No Known Allergies    Hospital Medications:   MEDICATIONS  (STANDING):  ALBUTerol    90 MICROgram(s) HFA Inhaler 1 Puff(s) Inhalation every 6 hours  calcium carbonate 1250 mG + Vitamin D (OsCal 500 + D) 1 Tablet(s) Oral two times a day  chlorhexidine 0.12% Liquid 15 milliLiter(s) Swish and Spit two times a day  dexmedetomidine Infusion 0.2 MICROgram(s)/kG/Hr (3.745 mL/Hr) IV Continuous <Continuous>  erythromycin     base Tablet 250 milliGRAM(s) Oral every 12 hours  fentaNYL   Infusion 2 MICROgram(s)/kG/Hr (14.98 mL/Hr) IV Continuous <Continuous>  influenza   Vaccine 0.5 milliLiter(s) IntraMuscular once  ipratropium 17 MICROgram(s) HFA Inhaler 1 Puff(s) Inhalation every 6 hours  levoFLOXacin IVPB 250 milliGRAM(s) IV Intermittent every 24 hours  meropenem  IVPB 750 milliGRAM(s) IV Intermittent every 24 hours  micafungin IVPB      micafungin IVPB 100 milliGRAM(s) IV Intermittent every 24 hours  norepinephrine Infusion 0.5 MICROgram(s)/kG/Min (35.109 mL/Hr) IV Continuous <Continuous>  pantoprazole  Injectable 40 milliGRAM(s) IV Push daily  senna 1 Tablet(s) Oral two times a day        VITALS:  T(F): 98.4 (18 @ 04:00), Max: 99.4 (18 @ 16:00)  HR: 82 (18 @ 05:35)  BP: 164/83 (18 @ 05:35)  RR: 31 (18 @ 05:35)  SpO2: 94% (18 @ 05:35)  Wt(kg): --     @ 07:01  -   @ 07:00  --------------------------------------------------------  IN: 3380 mL / OUT: 261 mL / NET: 3119 mL     @ 07:01  -   @ 07:00  --------------------------------------------------------  IN: 3025.1 mL / OUT: 825 mL / NET: 2200.1 mL        Weight (kg): 76.8 ( @ 05:35)    PHYSICAL EXAM:  Constitutional: NAD  HEENT: anicteric sclera, oropharynx clear, MMM  Neck: No JVD  Respiratory: CTAB, no wheezes, rales or rhonchi  Cardiovascular: S1, S2, RRR  Gastrointestinal: BS+, soft, NT/ND  Extremities: No cyanosis or clubbing. No peripheral edema  Neurological: A/O x 3, no focal deficits  : No CVA tenderness. No alva.   Skin: No rashes  Vascular Access:    LABS:      139  |  98  |  x   ----------------------------<  106  5.8<H>   |  24  |  x     Ca    6.9<L>      18 Mar 2018 05:52  Phos  8.1       Mg     2.2         TPro  6.1  /  Alb  1.8<L>  /  TBili  0.6  /  DBili      /  AST  20  /  ALT  13  /  AlkPhos  67                            6.8    9.50  )-----------( 66       ( 18 Mar 2018 05:52 )             21.1     Creatinine, Serum: 6.1 mg/dL (18 @ 18:50)  Creatinine, Serum: 7.2 mg/dL (18 @ 05:34)  Creatinine, Serum: 8.1 mg/dL (18 @ 04:58)  Creatinine, Serum: 6.6 mg/dL (03-15-18 @ 05:35)  Creatinine, Serum: 7.0 mg/dL (18 @ 05:19)  Creatinine, Serum: 9.3 mg/dL (18 @ 05:45)    Creatinine Trend: 6.1<--, 7.2<--, 8.1<--, 6.6<--, 7.0<--, 9.3<--      Urine Studies:  Urinalysis Basic - ( 12 Mar 2018 00:47 )    Color: Red / Appearance: Turbid / S.025 / pH:   Gluc:  / Ketone: Negative  / Bili: Small / Urobili: 0.2 mg/dL   Blood:  / Protein: 30 mg/dL / Nitrite: Negative   Leuk Esterase: Trace / RBC:  / WBC 6-10 /HPF   Sq Epi:  / Non Sq Epi: Moderate /HPF / Bacteria: Many /HPF        RADIOLOGY & ADDITIONAL STUDIES: Nephrology progress note    Patient is seen and examined, events over the last 24 h noted.  intubated on vent; alva placed; oliguric; 1 pressor;    Allergies:  No Known Allergies    Hospital Medications:   MEDICATIONS  (STANDING):  ALBUTerol    90 MICROgram(s) HFA Inhaler 1 Puff(s) Inhalation every 6 hours  calcium carbonate 1250 mG + Vitamin D (OsCal 500 + D) 1 Tablet(s) Oral two times a day  chlorhexidine 0.12% Liquid 15 milliLiter(s) Swish and Spit two times a day  dexmedetomidine Infusion 0.2 MICROgram(s)/kG/Hr (3.745 mL/Hr) IV Continuous <Continuous>  erythromycin     base Tablet 250 milliGRAM(s) Oral every 12 hours  fentaNYL   Infusion 2 MICROgram(s)/kG/Hr (14.98 mL/Hr) IV Continuous <Continuous>  influenza   Vaccine 0.5 milliLiter(s) IntraMuscular once  ipratropium 17 MICROgram(s) HFA Inhaler 1 Puff(s) Inhalation every 6 hours  levoFLOXacin IVPB 250 milliGRAM(s) IV Intermittent every 24 hours  meropenem  IVPB 750 milliGRAM(s) IV Intermittent every 24 hours  micafungin IVPB 100 milliGRAM(s) IV Intermittent every 24 hours  norepinephrine Infusion 0.5 MICROgram(s)/kG/Min (35.109 mL/Hr) IV Continuous <Continuous>  pantoprazole  Injectable 40 milliGRAM(s) IV Push daily  senna 1 Tablet(s) Oral two times a day    VITALS:  T(F): 98.4 (18 @ 04:00), Max: 99.4 (18 @ 16:00)  HR: 82 (18 @ 05:35)  BP: 164/83 (18 @ 05:35)Vital Signs Last 24 Hrs  BP: 105/60 (18 Mar 2018 11:25) (87/52 - 174/87)  RR: 31 (18 @ 05:35)  SpO2: 94% (18 @ 05:35)    16 @ 07:01  -   @ 07:00  --------------------------------------------------------  IN: 3380 mL / OUT: 261 mL / NET: 3119 mL     @ 07:01  -   @ 07:00  --------------------------------------------------------  IN: 3025.1 mL / OUT: 825 mL / NET: 2200.1 mL      Weight (kg): 76.8 ( @ 05:35)    PHYSICAL EXAM:  Constitutional: intubated on vent, sendated   Neck: No JVD  Respiratory: b/l crackles   Cardiovascular: S1, S2, RRR  Gastrointestinal: BS+, soft, NT/ND  Extremities: No cyanosis or clubbing. No peripheral edema  : positive alva.   Vascular access: Femtrol Luning    LABS:    139   |  98  |  126  -----------------------------<  106                  18 @ 05:52  5.8    |  24    |  6.9  Creatinine Trend: 6.1<--, 7.2<--, 8.1<--, 6.6<--, 7.0<--, 9.3<--  Potassium Trend: 5.8<--, 4.9<--, 5.4<--, 6.2<--, 5.1<--  Ca    6.9<L>      18 Mar 2018 05:52  Phos  8.1       Mg     2.2         TPro  6.1  /  Alb  1.8<L>  /  TBili  0.6  /  DBili      /  AST  20  /  ALT  13  /  AlkPhos  67                          6.8    9.50  )-----------( 66       ( 18 Mar 2018 05:52 )             21.1  Blood Gas Profile - Arterial (18 @ 07:12)    pH, Arterial: 7.36: FIO2:40  MODE:AC/VC   VT:450   RATE:32   PEEP:7.5  Comment:_    pCO2, Arterial: 41: FIO2:40  MODE:AC/VC   VT:450   RATE:32   PEEP:7.5  Comment:_ mmHg    pO2, Arterial: 69: FIO2:40  MODE:AC/VC   VT:450   RATE:32   PEEP:7.5  Comment:_ mmHg    HCO3, Arterial: 23: FIO2:40  MODE:AC/VC   VT:450   RATE:32   PEEP:7.5  Comment:_ mmoL/L    Base Excess, Arterial: -2.2: FIO2:40  MODE:AC/VC   VT:450   RATE:32   PEEP:7.5  Comment:_ mmoL/L    Oxygen Saturation, Arterial: 93: FIO2:40  MODE:AC/VC   VT:450   RATE:32   PEEP:7.5  Comment:_ %    FIO2, Arterial: 40: FIO2:40  MODE:AC/VC   VT:450   RATE:32   PEEP:7.5  Comment:_      Urine Studies:  Urinalysis Basic - ( 12 Mar 2018 00:47 )    Color: Red / Appearance: Turbid / S.025 / pH:   Gluc:  / Ketone: Negative  / Bili: Small / Urobili: 0.2 mg/dL   Blood:  / Protein: 30 mg/dL / Nitrite: Negative   Leuk Esterase: Trace / RBC:  / WBC 6-10 /HPF   Sq Epi:  / Non Sq Epi: Moderate /HPF / Bacteria: Many /HPF    RADIOLOGY & ADDITIONAL STUDIES:  < from: Xray Chest 1 View- PORTABLE-Routine (18 @ 05:44) >  Impression:      Bilateral opacities opacities (right greater than left).    Support tubes and lines as above.    < end of copied text >

## 2018-03-18 NOTE — PROVIDER CONTACT NOTE (OTHER) - SITUATION
pt has had no bm and received : katie gluconate, K exelate, dextrose, and insulin for high potassium level.
ET tube noted to be at 23 cm
MD Rohan Edwards made aware pt residual 400 ml. Reglan being given as per md order. As per policy 200ml returned.

## 2018-03-19 NOTE — PROGRESS NOTE ADULT - ASSESSMENT
Multilobar PNA especially on the RUL and also on the left.  CXR no change.  S/P BAL with normal mini.  Levo 250 mg iv q24h.  Meropenem 750 mg iv q24h    Rash.  unclear etiology. Not viral in nature. Drug fevers?

## 2018-03-19 NOTE — PROGRESS NOTE ADULT - SUBJECTIVE AND OBJECTIVE BOX
Patient is a 67y old  Male who presents with a chief complaint of sob, weakness (09 Mar 2018 05:52)  still vented, no pressors   ON OGT FEED  Diet, NPO with Tube Feed:   Peptamen    Tube Feeding Modality: Orogastric  Continuous  Starting Tube Feed Rate mL per Hour: 30     Every 3 hours  Tube Feed Duration (in Hours): 65  Tube Feed Start Time: 13:00 (03-15-18 @ 15:59)    LABS      139  |  95<L>  |  147<HH>  ----------------------------<  87  5.8<H>   |  22  |  8.3<HH>    Ca    6.6<L>      19 Mar 2018 05:19  Mg     2.4         TPro  6.0  /  Alb  1.9<L>  /  TBili  0.5  /  DBili  x   /  AST  19  /  ALT  12  /  AlkPhos  58                            7.4    9.65  )-----------( 78       ( 19 Mar 2018 05:19 )             22.8       Allergies  No Known Allergies    Drug Dosing Weight  Height (cm): 176.53 (15 Mar 2018 16:44)  Weight (kg): 76.8 (18 Mar 2018 05:35)  BMI (kg/m2): 24.6 (18 Mar 2018 05:35)  BSA (m2): 1.93 (18 Mar 2018 05:35)  Daily Weigh in k.2 (19 Mar 2018 06:00)    T(C): 36.9 (18 @ 12:00), Max: 37.1 (18 @ 16:00)  HR: 74 (18 @ 14:00) (72 - 104)  BP: 86/54 (18 @ 14:00) (72/50 - 170/74)  RR: 30 (18 @ 14:00) (24 - 35)  SpO2: 100% (18 @ 14:00) (98% - 100%)      18 @ 07:01  -  18 @ 07:00  --------------------------------------------------------  IN: 1719 mL / OUT: 375 mL / NET: 1344 mL    18 @ 07:01  -  18 @ 15:27  --------------------------------------------------------  IN: 108 mL / OUT: 60 mL / NET: 48 mL        ALBUTerol    90 MICROgram(s) HFA Inhaler 1 Puff(s) Inhalation every 6 hours  calcium acetate 667 milliGRAM(s) Oral three times a day with meals  calcium carbonate 1250 mG + Vitamin D (OsCal 500 + D) 1 Tablet(s) Oral two times a day  chlorhexidine 0.12% Liquid 15 milliLiter(s) Swish and Spit two times a day  dexmedetomidine Infusion 0.2 MICROgram(s)/kG/Hr IV Continuous <Continuous>  dextrose 50% Injectable 50 milliLiter(s) IV Push once  erythromycin     base Tablet 250 milliGRAM(s) Oral every 12 hours  fentaNYL   Infusion 2 MICROgram(s)/kG/Hr IV Continuous <Continuous>  influenza   Vaccine 0.5 milliLiter(s) IntraMuscular once  ipratropium 17 MICROgram(s) HFA Inhaler 1 Puff(s) Inhalation every 6 hours  lactulose Syrup 20 Gram(s) Oral once  lactulose Syrup      levoFLOXacin IVPB 250 milliGRAM(s) IV Intermittent every 24 hours  meropenem  IVPB 750 milliGRAM(s) IV Intermittent every 24 hours  metoclopramide 5 milliGRAM(s) Oral two times a day  midazolam Infusion 0.5 mG/Hr IV Continuous <Continuous>  norepinephrine Infusion 0.5 MICROgram(s)/kG/Min IV Continuous <Continuous>  pantoprazole  Injectable 40 milliGRAM(s) IV Push daily  senna 1 Tablet(s) Oral two times a day      RADIOLOGY STUDIES Patient is a 67y old  Male who presents with a chief complaint of sob, weakness (09 Mar 2018 05:52)  still vented, no pressors   ON OGT FEED  Diet, NPO with Tube Feed:   Peptamen af  Tube Feeding Modality: Orogastric  Tube Feed Duration (in Hours): 65 ml/hr  Tube Feed Start Time: 13:00 (03-15-18 @ 15:59)    LABS      139  |  95<L>  |  147<HH>  ----------------------------<  87  5.8<H>   |  22  |  8.3<HH>    Ca    6.6<L>      19 Mar 2018 05:19  Mg     2.4         TPro  6.0  /  Alb  1.9<L>  /  TBili  0.5  /  DBili  x   /  AST  19  /  ALT  12  /  AlkPhos  58                            7.4    9.65  )-----------( 78       ( 19 Mar 2018 05:19 )             22.8       Allergies  No Known Allergies    Drug Dosing Weight  Height (cm): 176.53 (15 Mar 2018 16:44)  Weight (kg): 76.8 (18 Mar 2018 05:35)  BMI (kg/m2): 24.6 (18 Mar 2018 05:35)  BSA (m2): 1.93 (18 Mar 2018 05:35)  Daily Weigh in k.2 (19 Mar 2018 06:00)    T(C): 36.9 (18 @ 12:00), Max: 37.1 (18 @ 16:00)  HR: 74 (18 @ 14:00) (72 - 104)  BP: 86/54 (18 @ 14:00) (72/50 - 170/74)  RR: 30 (18 @ 14:00) (24 - 35)  SpO2: 100% (18 @ 14:00) (98% - 100%)      18 @ 07:01  -  18 @ 07:00  --------------------------------------------------------  IN: 1719 mL / OUT: 375 mL / NET: 1344 mL    18 @ 07:01  -  18 @ 15:27  --------------------------------------------------------  IN: 108 mL / OUT: 60 mL / NET: 48 mL        ALBUTerol    90 MICROgram(s) HFA Inhaler 1 Puff(s) Inhalation every 6 hours  calcium acetate 667 milliGRAM(s) Oral three times a day with meals  calcium carbonate 1250 mG + Vitamin D (OsCal 500 + D) 1 Tablet(s) Oral two times a day  chlorhexidine 0.12% Liquid 15 milliLiter(s) Swish and Spit two times a day  dexmedetomidine Infusion 0.2 MICROgram(s)/kG/Hr IV Continuous <Continuous>  dextrose 50% Injectable 50 milliLiter(s) IV Push once  erythromycin     base Tablet 250 milliGRAM(s) Oral every 12 hours  fentaNYL   Infusion 2 MICROgram(s)/kG/Hr IV Continuous <Continuous>  influenza   Vaccine 0.5 milliLiter(s) IntraMuscular once  ipratropium 17 MICROgram(s) HFA Inhaler 1 Puff(s) Inhalation every 6 hours  lactulose Syrup 20 Gram(s) Oral once  lactulose Syrup      levoFLOXacin IVPB 250 milliGRAM(s) IV Intermittent every 24 hours  meropenem  IVPB 750 milliGRAM(s) IV Intermittent every 24 hours  metoclopramide 5 milliGRAM(s) Oral two times a day  midazolam Infusion 0.5 mG/Hr IV Continuous <Continuous>  norepinephrine Infusion 0.5 MICROgram(s)/kG/Min IV Continuous <Continuous>  pantoprazole  Injectable 40 milliGRAM(s) IV Push daily  senna 1 Tablet(s) Oral two times a day      RADIOLOGY STUDIES

## 2018-03-19 NOTE — PROGRESS NOTE ADULT - PROBLEM SELECTOR PROBLEM 3
Acute renal failure, unspecified acute renal failure type

## 2018-03-19 NOTE — PROGRESS NOTE ADULT - SUBJECTIVE AND OBJECTIVE BOX
He was undergoing HD when I saw him. He opened his eye. He did squeeze my hand when I placed it in his left hand. He did not follow commands and did not track my finger with his eyes.      Vital Signs Last 24 Hrs  T(C): 37.1 (19 Mar 2018 16:00), Max: 37.1 (19 Mar 2018 16:00)  T(F): 98.7 (19 Mar 2018 16:00), Max: 98.7 (19 Mar 2018 16:00)  HR: 81 (19 Mar 2018 16:10) (72 - 104)  BP: 141/68 (19 Mar 2018 16:10) (72/50 - 170/74)  BP(mean): 63 (19 Mar 2018 16:00) (55 - 115)  RR: 30 (19 Mar 2018 16:10) (24 - 35)  SpO2: 100% (19 Mar 2018 16:10) (98% - 100%)    Allergies:No Known Allergies  Intolerances  MEDICATIONS  (STANDING):  ALBUTerol    90 MICROgram(s) HFA Inhaler 1 Puff(s) Inhalation every 6 hours  calcium acetate 667 milliGRAM(s) Oral three times a day with meals  calcium carbonate 1250 mG + Vitamin D (OsCal 500 + D) 1 Tablet(s) Oral two times a day  chlorhexidine 0.12% Liquid 15 milliLiter(s) Swish and Spit two times a day  dexmedetomidine Infusion 0.2 MICROgram(s)/kG/Hr (3.745 mL/Hr) IV Continuous <Continuous>  dextrose 50% Injectable 50 milliLiter(s) IV Push once  erythromycin     base Tablet 250 milliGRAM(s) Oral every 12 hours  fentaNYL   Infusion 2 MICROgram(s)/kG/Hr (14.98 mL/Hr) IV Continuous <Continuous>  influenza   Vaccine 0.5 milliLiter(s) IntraMuscular once  ipratropium 17 MICROgram(s) HFA Inhaler 1 Puff(s) Inhalation every 6 hours  lactulose Syrup 20 Gram(s) Oral once  lactulose Syrup      levoFLOXacin IVPB 250 milliGRAM(s) IV Intermittent every 24 hours  meropenem  IVPB 750 milliGRAM(s) IV Intermittent every 24 hours  metoclopramide 5 milliGRAM(s) Oral two times a day  midazolam Infusion 0.5 mG/Hr (0.5 mL/Hr) IV Continuous <Continuous>  norepinephrine Infusion 0.5 MICROgram(s)/kG/Min (35.109 mL/Hr) IV Continuous <Continuous>  pantoprazole  Injectable 40 milliGRAM(s) IV Push daily  senna 1 Tablet(s) Oral two times a day    MEDICATIONS  (PRN):      CBC Full  -  ( 19 Mar 2018 05:19 )  WBC Count : 9.65 K/uL  Hemoglobin : 7.4 g/dL  Hematocrit : 22.8 %  Platelet Count - Automated : 78 K/uL  Mean Cell Volume : 90.5 fL  Mean Cell Hemoglobin : 29.4 pg  Mean Cell Hemoglobin Concentration : 32.5 g/dL  Auto Neutrophil # : 8.90 K/uL  Auto Lymphocyte # : 0.30 K/uL  Auto Monocyte # : 0.24 K/uL  Auto Eosinophil # : 0.07 K/uL  Auto Basophil # : 0.01 K/uL  Auto Neutrophil % : 92.3 %  Auto Lymphocyte % : 3.1 %  Auto Monocyte % : 2.5 %  Auto Eosinophil % : 0.7 %  Auto Basophil % : 0.1 %  PT/INR - ( 19 Mar 2018 05:19 )   PT: 13.10 sec;   INR: 1.21 ratio         PTT - ( 19 Mar 2018 05:19 )  PTT:40.3 sec             7.4    9.65  )-----------( 78       ( 03-19 @ 05:19 )             22.8                6.8    9.50  )-----------( 66       ( 03-18 @ 05:52 )             21.1                7.1    9.39  )-----------( 67       ( 03-17 @ 18:50 )             21.8                6.1    7.35  )-----------( 56       ( 03-17 @ 05:34 )             19.3                7.4    12.33 )-----------( 65       ( 03-16 @ 04:58 )             23.4                7.7    12.11 )-----------( 58       ( 03-15 @ 05:35 )             23.6       03-19    139  |  95<L>  |  147<HH>  ----------------------------<  87  5.8<H>   |  22  |  8.3<HH>    Ca    6.6<L>      19 Mar 2018 05:19  Mg     2.4     03-19    TPro  6.0  /  Alb  1.9<L>  /  TBili  0.5  /  DBili  x   /  AST  19  /  ALT  12  /  AlkPhos  58  03-19  LIVER FUNCTIONS - ( 19 Mar 2018 05:19 )  Alb: 1.9 g/dL / Pro: 6.0 g/dL / ALK PHOS: 58 U/L / ALT: 12 U/L / AST: 19 U/L / GGT: x               CXR:  CXR:  Image(s) Available    EXAM:  XR CHEST FRONTAL 1V            PROCEDURE DATE:  03/19/2018            INTERPRETATION:  Clinical History / Reason for exam: Intubated patient    Comparison : Chest radiograph 3/18/2018    Technique/Positioning: Satisfactory. Single portable chest.    Findings:    Support devices: ET tube is seen with the tip in the region of aortic   arch. Feeding tube is seen with the tip not included in the examination   it is below the left hemidiaphragm. Right-sided central venous catheter   is seen with the tip in the superior vena cava. EKG leads.    Cardiac/mediastinum/hilum: Unremarkable.    Lung parenchyma/Pleura: Bilateral lung opacities, with the confluent   opacity in the right mid lung field. Findings are unchanged.    Small amount of right pleural effusion is unchanged.    No definite pneumothorax.    Skeleton/soft tissues: Stable.     Impression:      Bilateral diffuse lung opacities with confluent opacity in the right mid   lung field and right pleural effusion, all are unchanged.                  MATTY TOVAR M.D., ATTENDING RADIOLOGIST  This document has been electronically signed. Mar 19 2018  9:16AM              --  --  --  --      Skeletal survey (2011) Impression: Two lucencies within the skull, may represent a venous lake or benign process, correlate clinically with laboratory values ? No other lytic lesions identified ? C5-6 C6-7 degenerative disk disease cervical spine ? Mild bilateral knee degenerative changes     Textual Result SEE TEXT (2011)   RESULTS FOR PATIENT - #[id number]  ARLEY COKER  [GENO/LA LT CHAIN]  [id number] - [id number]  IRASEMA: 07/27/11 1733  RECV: 07/27/11 1927       FREE KAPPA SERU                24.3   H    mg/L          (3.3-19.4)       FREE LAMBDA SER                 2.0   L    mg/L          (5.7-26.3)       FREE K/L RATIO                12.15   H                 (0.26-1.65)    Result Name Results Units Reference Range Textual Result SEE TEXT   [id number] - [id number]  IRASEMA: 07/27/11 1733  RECV: 07/27/11 1927       IgA                            2944   H    mg/dL           ()    IMMUNOFIX SERUM (()) IgA-Kappa monoclonal protein present. Test Performed at: [place] [address] unless otherwise noted.      Bone marrow biopsy: 8/2011: Palmacytosis with increased kappa light chains.          REVIEW OF SYSTEMS:    Unable to obtain.      PYSICAL EXAM:  GENERAL: Eyes were open see above ET, OT and right TLC in place  HEAD:  Atraumatic, Normocephalic  EYES:  conjunctiva and sclera clear  CHEST/LUNG No wheeze, transmitted ET tube sounds and rhonchi in bases  HEART: Regular rate and rhythm; No murmurs, rubs, or gallops  ABDOMEN: His abdomen is soft, not distended  EXTREMITIES:, No clubbing, cyanosis, or edema, has SCDs in place  NEUROLOGY: Sedated  SKIN: No rashes   : Marcelo but no Urine

## 2018-03-19 NOTE — PROGRESS NOTE ADULT - ASSESSMENT
Assessment and Plan:   		  AHRF/ SEVERE PNA/ ADVANCED MM RENAL FAILURE S/P HD, SEVERE ANEMIA NO ACTIVE BLEED,FOLLOWING SIMPLE COMMANDS    CNS: Sedation vacation AS TOLERATED    PULM: DEC FIO2  acidotic but likely due to [place]ISON   weaning trial today post HD  NO EXTUBATION YET    CARDIO: I<O    RENAL: HD today      HEM ONC:  hem onc f up  GI: Cont OG feeding  levo and bonita, follow up ID   cont senna and reglan    HEME /ONC FOLLOW UP   ID: Cont abx as per  ID RECOMMENDATION   Palliative care / hospice meeting  POOR PROGNOSIS  SPOKE AT LENGTH WITH FAMILY

## 2018-03-19 NOTE — PROGRESS NOTE ADULT - ASSESSMENT
68 y/o M PMH Multiple Myeloma ( was  being treated with holistics approach, Anemia who presented  with weakness/ SOB for past 2 weeks , worsened over the past 2 days, admitted with diagnosis of sepsis due to multifocal pneumonia/ acute hypoxemic respiratory failure requiring intubation.    #Septic Shock  2/2 PNA/ Acute hypoxic respiratory failure  - respiratory status unchanged PEEP 7.5, O2 40%, intubated may need a trach  -antibiotics as per team  --pressors as per team, currently off    #History of Multiple Myeloma now in Severe Renal failure/ Metabolic acidosis, the renal failure maybe multifactorial, sepsis and myeloma  -S/P 4 days  of pulse Dexamethasone 40 MG IV daily now Day 11  -CTs did show lytic lesions < from: CT Chest No Cont (03.09.18 @ 00:54) >  Osseous lucent lesions and overall demineralization compatible patient's  known multiple myeloma.  -SPEP Mspike was 2.9 Immunoglobulin Free Light Chains, Serum (03.09.18 @ 17:13)    Cowley/Lambda Free Light Chain Ratio, Serum: 777.50 Ratio    NANI Kappa: 311.00 mg/dL    NANI Lambda: 0.40 mg/dL  -will not offer any further treatment at this time, it most likely will not be of any benefit if he does not recover and he may have not wanted it.    #Anemia/ multifactorial   -stable HgB   Multiple myeloma/acute illness with bone marrow suppression,  anemia in CKD,   keep hb>7. transfusions   as needed   Ferritin 1559, sat 21, Folate 13.9    #lEvated pt/PTT possibly from  K deficency his sepsis has resolved  -no active gross bleeding a this time  -if bleeding  and PT/PTT>1.5 UPN  can administer 10-15ML of FFP and if Fibrinogen is less than 100 can give Cryoprecipitate as well.  -monitor PT/PTT and Fibrinogen if bleeding  -  #Mild Hypercalcemia resolved   -this is multifactorial and maybe due to Myeloma or  Renal failure and is decreasing  -responding to Steroids for MM   and or HD  -no Bisphosphonates for now due to renal failure      #Elevated Bili and AST  -resolved    #New thrombocytopenia  now moderate this is multifactorial and is improving  - Hep SC stopped   and this  started about  day 5. I doubt HIT the drop is not 50% and he has multiple other causes, infection, DIC,  medication ,HIT serology panel pending but would not start DTI. His Dopplers 3/13 negative and he maybe bleeding  -keep plts >50,000 if bleeding or >10,000 if not. If overt DIC and bleeding may need a platelet count of >90,000  -once HIT panel is back and if negative restart Hep SC for DVT ppx    #Black OG secretions due to GI bleed? Medications? Old feeds? resolved  -most likely was not GI bleed      Prognosis is poor. Family is aware.   If they decide on comfort measure and  extubate him this is  reasonable. Since he did not want treatment for his Myeloma.

## 2018-03-19 NOTE — PROGRESS NOTE ADULT - ASSESSMENT
67 year male patient with history of MM diagnosed in 2011, was on trial medication in [facility] for 3years, then did not seek any medical care thereafter presented for SOB, cough , generalized weakness and decreased po intake since 2 weeks.    #Septic Shock  2/2 PNA/ Acute hypoxic respiratory failure  - Intubated 3/9  - CXR unchanged; bilateral opacities.  - c/w Meropenem/Levoquin as per ID, d/c Micafungin per attending  - Cultures NGTD  - Flu negative  - G1DD on ECHO  - s/p Bronc 3/12  - BAL NGTD  - Urine legionella negative    #Multiple Myeloma/Renal failure/Hypocalcemia  - Nephro and Hem/onc following  - HD planned today  - Pressors if BP drops  - Ca supplement and Vit D for low Ca  - s/p four days of pulse Dexamethasone 40 MG IV per heme onc    # Hyperkalemia  - Insulin/D50 and pm BMP    #Anemia- Multifactorial   - Transfuse <7  - 1U PRBC today  - s/p 9U PRBC    # Thrombocytopenia/Elevated PT/PTT 2/2 Sepsis vs DIC/ HIT  - No overt bleeding  - No heparin products  - LE doppler negative for DVT   - if bleeding  and PT/PTT>1.5 UPN  can administer 10-15ML of FFP as per heme onc  - Monitor PT/PTT and Fibrinogen if bleeding  - May need a dose of Vitamin K oral  1-2.5mg to prevent K deficiency since on antibiotics    #GI  - C/w PO erythromycin at q12h  -lactulose for constipation given    DVT ppx- SCD's  Poor prognosis 67 year male patient with history of MM diagnosed in 2011, was on trial medication in [facility] for 3years, then did not seek any medical care thereafter presented for SOB, cough , generalized weakness and decreased po intake since 2 weeks.    #Septic Shock  2/2 PNA/ Acute hypoxic respiratory failure  - Intubated 3/9  - CXR unchanged; bilateral opacities.  - c/w Meropenem/Levoquin as per ID, d/c Micafungin per attending  - Cultures NGTD  - Flu negative  - G1DD on ECHO  - s/p Bronc 3/12  - BAL NGTD  - Urine legionella negative    #new rash;  on trunk ?dout herpes  unclear etiology    #Multiple Myeloma/Renal failure/Hypocalcemia  - Nephro and Hem/onc following  - HD planned today  - Pressors if BP drops  - Ca supplement and Vit D for low Ca  - s/p four days of pulse Dexamethasone 40 MG IV per heme onc    # Hyperkalemia  - Insulin/D50 and pm BMP    -plan of HD today    #Anemia- Multifactorial   - Transfuse <7  - 1U PRBC  3/18    # Thrombocytopenia/Elevated PT/PTT 2/2 Sepsis vs DIC/ HIT  - No overt bleeding  - No heparin products  - LE doppler negative for DVT   - if bleeding  and PT/PTT>1.5 UPN  can administer 10-15ML of FFP as per heme onc  - Monitor PT/PTT and Fibrinogen if bleeding  - May need a dose of Vitamin K oral  1-2.5mg to prevent K deficiency since on antibiotics    #GI  - C/w PO erythromycin at q12h  -lactulose for constipation given    DVT ppx- SCD's  Poor prognosis         DNR now  discussed with family about poor prognosis

## 2018-03-19 NOTE — PROGRESS NOTE ADULT - PROBLEM SELECTOR PROBLEM 4
Pneumonia due to infectious organism, unspecified laterality, unspecified part of lung

## 2018-03-19 NOTE — PROGRESS NOTE ADULT - PROBLEM SELECTOR PROBLEM 2
Anemia, unspecified type

## 2018-03-19 NOTE — PROGRESS NOTE ADULT - SUBJECTIVE AND OBJECTIVE BOX
ARLEY COKER  67y, Male      OVERNIGHT EVENTS:    Rash on trunk    VITALS:  T(F): 98, Max: 98.8 (03-18-18 @ 16:00)  HR: 72  BP: 123/68  RR: 32Vital Signs Last 24 Hrs  T(C): 36.7 (19 Mar 2018 04:00), Max: 37.1 (18 Mar 2018 08:00)  T(F): 98 (19 Mar 2018 04:00), Max: 98.8 (18 Mar 2018 16:00)  HR: 72 (19 Mar 2018 06:00) (68 - 104)  BP: 123/68 (19 Mar 2018 06:00) (72/50 - 170/74)  BP(mean): 85 (19 Mar 2018 06:00) (55 - 115)  RR: 32 (19 Mar 2018 06:00) (24 - 35)  SpO2: 100% (19 Mar 2018 06:00) (98% - 100%)    TESTS & MEASUREMENTS:                        7.4    9.65  )-----------( 78       ( 19 Mar 2018 05:19 )             22.8     03-18    139  |  95<L>  |  138<HH>  ----------------------------<  85  5.8<H>   |  22  |  7.5<HH>    Ca    7.2<L>      18 Mar 2018 16:46  Mg     2.2     03-18    TPro  6.1  /  Alb  1.8<L>  /  TBili  0.6  /  DBili  x   /  AST  20  /  ALT  13  /  AlkPhos  67  03-18    LIVER FUNCTIONS - ( 18 Mar 2018 05:52 )  Alb: 1.8 g/dL / Pro: 6.1 g/dL / ALK PHOS: 67 U/L / ALT: 13 U/L / AST: 20 U/L / GGT: x             Culture - Blood (collected 03-13-18 @ 05:45)  Source: .Blood None  Final Report (03-18-18 @ 12:00):    No growth at 5 days.    Culture - Bronchial (collected 03-12-18 @ 17:12)  Source: .Broncial None  Gram Stain (03-13-18 @ 23:00):    Few polymorphonuclear leukocytes per low power field    No squamous epithelial cells per low power field    No organisms seen per oil power field  Final Report (03-15-18 @ 21:08):    No growth at 48 hours    Culture - Fungal, Body Fluid (collected 03-12-18 @ 17:12)  Source: .Body Fluid Bronchial Washing (not pleural fluid)  Preliminary Report (03-13-18 @ 07:28):    Testing in progress    Culture - Blood (collected 03-12-18 @ 16:29)  Source: .Blood None  Final Report (03-18-18 @ 02:08):    No growth at 5 days.    Culture - Blood (collected 03-12-18 @ 11:14)  Source: .Blood None  Final Report (03-18-18 @ 02:08):    No growth at 5 days.    Culture - Blood (collected 03-12-18 @ 11:14)  Source: .Blood None  Final Report (03-18-18 @ 02:08):    No growth at 5 days.            RADIOLOGY & ADDITIONAL TESTS:    ANTIBIOTICS:  erythromycin     base Tablet 250 milliGRAM(s) Oral every 12 hours  levoFLOXacin IVPB 250 milliGRAM(s) IV Intermittent every 24 hours  meropenem  IVPB 750 milliGRAM(s) IV Intermittent every 24 hours

## 2018-03-19 NOTE — PROGRESS NOTE ADULT - SUBJECTIVE AND OBJECTIVE BOX
Over Night Events:  Patient seen and examined.   still vented, no pressors     ROS:  See HPI    PHYSICAL EXAM    ICU Vital Signs Last 24 Hrs  T(C): 36.5 (19 Mar 2018 08:00), Max: 37.1 (18 Mar 2018 11:25)  T(F): 97.7 (19 Mar 2018 08:00), Max: 98.8 (18 Mar 2018 16:00)  HR: 82 (19 Mar 2018 08:00) (68 - 104)  BP: 154/76 (19 Mar 2018 08:00) (72/50 - 170/74)  BP(mean): 107 (19 Mar 2018 08:00) (55 - 115)  ABP: --  ABP(mean): --  RR: 29 (19 Mar 2018 08:00) (24 - 35)  SpO2: 100% (19 Mar 2018 08:00) (98% - 100%)      General: awake , follow command   HEENT:                Lymph Nodes: NO cervical LN   Lungs: Bilateral BS  Cardiovascular: Regular   Abdomen: Soft, Positive BS  Extremities: No clubbing   Skin:   Neurological: no focal deficit     I&O's Detail    18 Mar 2018 07:01  -  19 Mar 2018 07:00  --------------------------------------------------------  IN:    dexmedetomidine Infusion: 39.3 mL    Enteral Tube Flush: 180 mL    fentaNYL  Infusion: 341.7 mL    midazolam Infusion: 51 mL    norepinephrine Infusion: 27 mL    Peptamen A.F.: 780 mL    PRBCs (Packed Red Blood Cells): 300 mL  Total IN: 1719 mL    OUT:    Indwelling Catheter - Urethral: 365 mL  Total OUT: 365 mL    Total NET: 1354 mL          LABS:                          7.4    9.65  )-----------( 78       ( 19 Mar 2018 05:19 )             22.8         19 Mar 2018 05:19    139    |  95     |  147    ----------------------------<  87     5.8     |  22     |  8.3      Ca    6.6        19 Mar 2018 05:19  Mg     2.4       19 Mar 2018 05:19    TPro  6.0    /  Alb  1.9    /  TBili  0.5    /  DBili  x      /  AST  19     /  ALT  12     /  AlkPhos  58     19 Mar 2018 05:19  Amylase x     lipase x                                                 PT/INR - ( 19 Mar 2018 05:19 )   PT: 13.10 sec;   INR: 1.21 ratio         PTT - ( 19 Mar 2018 05:19 )  PTT:40.3 sec                                                                                                                                                Mode: AC/ CMV (Assist Control/ Continuous Mandatory Ventilation)  RR (machine): 32  TV (machine): 450  FiO2: 40  PEEP: 7.5  ITime: 1  MAP: 25  PIP: 57                                      ABG - ( 19 Mar 2018 08:00 )  pH: 7.31  /  pCO2: 45    /  pO2: 184   / HCO3: 23    / Base Excess: -3.4  /  SaO2: 100                 MEDICATIONS  (STANDING):  ALBUTerol    90 MICROgram(s) HFA Inhaler 1 Puff(s) Inhalation every 6 hours  calcium acetate 667 milliGRAM(s) Oral three times a day with meals  calcium carbonate 1250 mG + Vitamin D (OsCal 500 + D) 1 Tablet(s) Oral two times a day  chlorhexidine 0.12% Liquid 15 milliLiter(s) Swish and Spit two times a day  dexmedetomidine Infusion 0.2 MICROgram(s)/kG/Hr (3.745 mL/Hr) IV Continuous <Continuous>  dextrose 50% Injectable 50 milliLiter(s) IV Push once  erythromycin     base Tablet 250 milliGRAM(s) Oral every 12 hours  fentaNYL   Infusion 2 MICROgram(s)/kG/Hr (14.98 mL/Hr) IV Continuous <Continuous>  influenza   Vaccine 0.5 milliLiter(s) IntraMuscular once  ipratropium 17 MICROgram(s) HFA Inhaler 1 Puff(s) Inhalation every 6 hours  lactulose Syrup 20 Gram(s) Oral once  lactulose Syrup      levoFLOXacin IVPB 250 milliGRAM(s) IV Intermittent every 24 hours  meropenem  IVPB 750 milliGRAM(s) IV Intermittent every 24 hours  metoclopramide 5 milliGRAM(s) Oral two times a day  midazolam Infusion 0.5 mG/Hr (0.5 mL/Hr) IV Continuous <Continuous>  norepinephrine Infusion 0.5 MICROgram(s)/kG/Min (35.109 mL/Hr) IV Continuous <Continuous>  pantoprazole  Injectable 40 milliGRAM(s) IV Push daily  senna 1 Tablet(s) Oral two times a day    MEDICATIONS  (PRN):          Xrays:  right infiltrate                                                                                  ECHO:

## 2018-03-19 NOTE — PROGRESS NOTE ADULT - SUBJECTIVE AND OBJECTIVE BOX
PGY I NOTE    LENGTH OF HOSPITAL STAY:  10 d    Pt seen and examined at bedside. Follows commands, moves all extremities.  got blood yesterday  plan of HD today   hyperkalemia treated medically as per renal  c/o constipation,not tolerating diet,off pressors    CHIEF COMPLAINT:Patient is a 67y old  Male who presents with a chief complaint of sob, weakness (09 Mar 2018 05:52)    HPI:HPI:  68 y/o M PMH Multiple Myeloma, Anemia who presents with weakness, SOB.  Patient had a dry cough and felt like he was sick for the past 2 weeks and this worsened over the past 2 days.  Patient is unable to walk without being extremely dyspneic , dyspnea gradually progressed to be at rest. Patient had chills in the last 2 days Patient is weak and not eating and not drinking well. Patient also has bone pain in his right flank.  Patient has not seen a doctor in numerous years and is not on medication.  Family felt he looked pale and weak. Patient denies any hematuria, melena or hematochezia. Patient is on iron tablets since 5 years, since then his stool is dark, with no recent change. Patient denies any alteration in bowl movements no nausea or vomiting, but there is decrease in PO intake. In the ER patient received one unit of blood , when he was received the second unit, he spiked a tem of 38.5, no change in hemodynamics, work up for transfusion reaction is sent. Patient was not taking medication or following up with any doctor cause he believes in herbal medicine only. (09 Mar 2018 05:52)    OVERNIGHT EVENTS/INTERVAL UPDATES:    PMH & PSH  PAST MEDICAL & SURGICAL HISTORY:  Anemia, unspecified type  Multiple myeloma, remission status unspecified    SOCIAL HISTORY: Negative    ALLERGIES: No Known Allergies    HOME MEDICATIONS  Home Medications:    PHYSICAL EXAM:  ICU Vital Signs Last 24 Hrs  T(C): 36.7 (19 Mar 2018 04:00), Max: 37.1 (18 Mar 2018 08:00)  T(F): 98 (19 Mar 2018 04:00), Max: 98.8 (18 Mar 2018 16:00)  HR: 77 (19 Mar 2018 05:00) (68 - 104)  BP: 97/61 (19 Mar 2018 05:00) (72/50 - 170/74)  BP(mean): 71 (19 Mar 2018 05:00) (55 - 115)  ABP: --  ABP(mean): --  RR: 31 (19 Mar 2018 05:00) (24 - 35)  SpO2: 99% (19 Mar 2018 05:00) (98% - 100%)    I&O's Summary    17 Mar 2018 07:01  -  18 Mar 2018 07:00  --------------------------------------------------------  IN: 3025.1 mL / OUT: 825 mL / NET: 2200.1 mL    18 Mar 2018 07:01  -  19 Mar 2018 06:01  --------------------------------------------------------  IN: 1668 mL / OUT: 250 mL / NET: 1418 mL            General: Agitated/Anxious appearing-clenching fists  HEENT: NCAT  CV: B/L Rhonci  RESP: CTAB  Abdominal: Soft, NTTP  Extremity: no c/c/e  Neuro: A&A    MEDICATIONS  MEDICATIONS  (STANDING):  ALBUTerol    90 MICROgram(s) HFA Inhaler 1 Puff(s) Inhalation every 6 hours  calcium acetate 667 milliGRAM(s) Oral three times a day with meals  calcium carbonate 1250 mG + Vitamin D (OsCal 500 + D) 1 Tablet(s) Oral two times a day  chlorhexidine 0.12% Liquid 15 milliLiter(s) Swish and Spit two times a day  dexmedetomidine Infusion 0.2 MICROgram(s)/kG/Hr (3.745 mL/Hr) IV Continuous <Continuous>  dextrose 50% Injectable 50 milliLiter(s) IV Push once  erythromycin     base Tablet 250 milliGRAM(s) Oral every 12 hours  fentaNYL   Infusion 2 MICROgram(s)/kG/Hr (14.98 mL/Hr) IV Continuous <Continuous>  influenza   Vaccine 0.5 milliLiter(s) IntraMuscular once  ipratropium 17 MICROgram(s) HFA Inhaler 1 Puff(s) Inhalation every 6 hours  lactulose Syrup 20 Gram(s) Oral once  lactulose Syrup      levoFLOXacin IVPB 250 milliGRAM(s) IV Intermittent every 24 hours  meropenem  IVPB 750 milliGRAM(s) IV Intermittent every 24 hours  metoclopramide 5 milliGRAM(s) Oral two times a day  midazolam Infusion 0.5 mG/Hr (0.5 mL/Hr) IV Continuous <Continuous>  norepinephrine Infusion 0.5 MICROgram(s)/kG/Min (35.109 mL/Hr) IV Continuous <Continuous>  pantoprazole  Injectable 40 milliGRAM(s) IV Push daily  senna 1 Tablet(s) Oral two times a day    MEDICATIONS  (PRN):    LABS:                        6.8    9.50  )-----------( 66       ( 18 Mar 2018 05:52 )             21.1    03-18    139  |  95<L>  |  138<HH>  ----------------------------<  85  5.8<H>   |  22  |  7.5<HH>    Ca    7.2<L>      18 Mar 2018 16:46  Mg     2.2     03-18    TPro  6.1  /  Alb  1.8<L>  /  TBili  0.6  /  DBili  x   /  AST  20  /  ALT  13  /  AlkPhos  67  03-18      LIVER FUNCTIONS - ( 18 Mar 2018 05:52 )  Alb: 1.8 g/dL / Pro: 6.1 g/dL / ALK PHOS: 67 U/L / ALT: 13 U/L / AST: 20 U/L / GGT: x                                        ABG - ( 18 Mar 2018 07:12 )  pH: 7.36  /  pCO2: 41    /  pO2: 69    / HCO3: 23    / Base Excess: -2.2  /  SaO2: 93              < from: Xray Chest 1 View- PORTABLE-Routine (03.18.18 @ 05:44) >    Impression:      Bilateral opacities opacities (right greater than left).    Support tubes and lines as above.      < end of copied text > PGY I NOTE    LENGTH OF HOSPITAL STAY:  10 d    Pt seen and examined at bedside.  moves all extremities.open eyes ,no tracking though  got blood yesterday  plan of HD today   hyperkalemia treated medically as per renal  c/o constipation,not tolerating diet,off pressors  new rash on trunk    CHIEF COMPLAINT:Patient is a 67y old  Male who presents with a chief complaint of sob, weakness (09 Mar 2018 05:52)    HPI:HPI:  66 y/o M PMH Multiple Myeloma, Anemia who presents with weakness, SOB.  Patient had a dry cough and felt like he was sick for the past 2 weeks and this worsened over the past 2 days.  Patient is unable to walk without being extremely dyspneic , dyspnea gradually progressed to be at rest. Patient had chills in the last 2 days Patient is weak and not eating and not drinking well. Patient also has bone pain in his right flank.  Patient has not seen a doctor in numerous years and is not on medication.  Family felt he looked pale and weak. Patient denies any hematuria, melena or hematochezia. Patient is on iron tablets since 5 years, since then his stool is dark, with no recent change. Patient denies any alteration in bowl movements no nausea or vomiting, but there is decrease in PO intake. In the ER patient received one unit of blood , when he was received the second unit, he spiked a tem of 38.5, no change in hemodynamics, work up for transfusion reaction is sent. Patient was not taking medication or following up with any doctor cause he believes in herbal medicine only. (09 Mar 2018 05:52)    OVERNIGHT EVENTS/INTERVAL UPDATES:    PMH & PSH  PAST MEDICAL & SURGICAL HISTORY:  Anemia, unspecified type  Multiple myeloma, remission status unspecified    SOCIAL HISTORY: Negative    ALLERGIES: No Known Allergies    HOME MEDICATIONS  Home Medications:    PHYSICAL EXAM:  ICU Vital Signs Last 24 Hrs  T(C): 36.7 (19 Mar 2018 04:00), Max: 37.1 (18 Mar 2018 08:00)  T(F): 98 (19 Mar 2018 04:00), Max: 98.8 (18 Mar 2018 16:00)  HR: 77 (19 Mar 2018 05:00) (68 - 104)  BP: 97/61 (19 Mar 2018 05:00) (72/50 - 170/74)  BP(mean): 71 (19 Mar 2018 05:00) (55 - 115)  ABP: --  ABP(mean): --  RR: 31 (19 Mar 2018 05:00) (24 - 35)  SpO2: 99% (19 Mar 2018 05:00) (98% - 100%)    I&O's Summary    17 Mar 2018 07:01  -  18 Mar 2018 07:00  --------------------------------------------------------  IN: 3025.1 mL / OUT: 825 mL / NET: 2200.1 mL    18 Mar 2018 07:01  -  19 Mar 2018 06:01  --------------------------------------------------------  IN: 1668 mL / OUT: 250 mL / NET: 1418 mL            General: Agitated/Anxious appearing-clenching fists  HEENT: NCAT  CV: B/L Rhonci  RESP: CTAB  Abdominal: Soft, NTTP  Extremity: no c/c/e  Neuro: off sedation ,irratated    MEDICATIONS  MEDICATIONS  (STANDING):  ALBUTerol    90 MICROgram(s) HFA Inhaler 1 Puff(s) Inhalation every 6 hours  calcium acetate 667 milliGRAM(s) Oral three times a day with meals  calcium carbonate 1250 mG + Vitamin D (OsCal 500 + D) 1 Tablet(s) Oral two times a day  chlorhexidine 0.12% Liquid 15 milliLiter(s) Swish and Spit two times a day  dexmedetomidine Infusion 0.2 MICROgram(s)/kG/Hr (3.745 mL/Hr) IV Continuous <Continuous>  dextrose 50% Injectable 50 milliLiter(s) IV Push once  erythromycin     base Tablet 250 milliGRAM(s) Oral every 12 hours  fentaNYL   Infusion 2 MICROgram(s)/kG/Hr (14.98 mL/Hr) IV Continuous <Continuous>  influenza   Vaccine 0.5 milliLiter(s) IntraMuscular once  ipratropium 17 MICROgram(s) HFA Inhaler 1 Puff(s) Inhalation every 6 hours  lactulose Syrup 20 Gram(s) Oral once  lactulose Syrup      levoFLOXacin IVPB 250 milliGRAM(s) IV Intermittent every 24 hours  meropenem  IVPB 750 milliGRAM(s) IV Intermittent every 24 hours  metoclopramide 5 milliGRAM(s) Oral two times a day  midazolam Infusion 0.5 mG/Hr (0.5 mL/Hr) IV Continuous <Continuous>  norepinephrine Infusion 0.5 MICROgram(s)/kG/Min (35.109 mL/Hr) IV Continuous <Continuous>  pantoprazole  Injectable 40 milliGRAM(s) IV Push daily  senna 1 Tablet(s) Oral two times a day    MEDICATIONS  (PRN):    LABS:                                 7.4    9.65  )-----------( 78       ( 19 Mar 2018 05:19 )             22.8   03-19    139  |  95<L>  |  147<HH>  ----------------------------<  87  5.8<H>   |  22  |  8.3<HH>    Ca    6.6<L>      19 Mar 2018 05:19  Mg     2.4     03-19    TPro  6.0  /  Alb  1.9<L>  /  TBili  0.5  /  DBili  x   /  AST  19  /  ALT  12  /  AlkPhos  58  03-19    LIVER FUNCTIONS - ( 18 Mar 2018 05:52 )  Alb: 1.8 g/dL / Pro: 6.1 g/dL / ALK PHOS: 67 U/L / ALT: 13 U/L / AST: 20 U/L / GGT: x                      < from: Xray Chest 1 View AP/PA (03.19.18 @ 06:26) >  Bilateral diffuse lung opacities with confluent opacity in the right mid   lung field and right pleural effusion, all are unchanged.      < end of copied text >                    ABG - ( 19 Mar 2018 15:50 )  pH: 7.31  /  pCO2: 45    /  pO2: 119   / HCO3: 22    / Base Excess: -3.8  /  SaO2: 99

## 2018-03-19 NOTE — PROGRESS NOTE ADULT - ASSESSMENT
[facility] on CKD (unknown baseline)/ hypocalcemia/ sepsis/ respiratory failure on vent     - oliguric;HD dependent, for Hd today due to hyperkalemia  - keep MAP >65  - c/w calcium carbonate   - Anemia: iron study noted; 1 UINT PRBC transfused saturday  - sepsis: f/u ID; appreciate ID recs on merrenm Levaquin   - Prognosis is guarded/ hospice travis don patient     will follow [facility] on CKD (unknown baseline)/ hypocalcemia/ sepsis/ respiratory failure on vent     - oliguric;HD dependent, for Hd today due to hyperkalemia  - keep MAP >65  - c/w calcium carbonate   - Anemia: iron study noted; one unit transfused saturday  - sepsis: f/u ID; appreciate ID recs on merrenm Levaquin   - Prognosis is guarded/ hospice called on patient     will follow

## 2018-03-19 NOTE — PROGRESS NOTE ADULT - SUBJECTIVE AND OBJECTIVE BOX
Nephrology progress note    Patient is seen and examined, events over the last 24 h noted .    Allergies:  No Known Allergies    Hospital Medications:   MEDICATIONS  (STANDING):  ALBUTerol    90 MICROgram(s) HFA Inhaler 1 Puff(s) Inhalation every 6 hours  calcium acetate 667 milliGRAM(s) Oral three times a day with meals  calcium carbonate 1250 mG + Vitamin D (OsCal 500 + D) 1 Tablet(s) Oral two times a day  chlorhexidine 0.12% Liquid 15 milliLiter(s) Swish and Spit two times a day  dexmedetomidine Infusion 0.2 MICROgram(s)/kG/Hr (3.745 mL/Hr) IV Continuous <Continuous>  dextrose 50% Injectable 50 milliLiter(s) IV Push once  erythromycin     base Tablet 250 milliGRAM(s) Oral every 12 hours  fentaNYL   Infusion 2 MICROgram(s)/kG/Hr (14.98 mL/Hr) IV Continuous <Continuous>  influenza   Vaccine 0.5 milliLiter(s) IntraMuscular once  ipratropium 17 MICROgram(s) HFA Inhaler 1 Puff(s) Inhalation every 6 hours  lactulose Syrup 20 Gram(s) Oral once  lactulose Syrup      levoFLOXacin IVPB 250 milliGRAM(s) IV Intermittent every 24 hours  meropenem  IVPB 750 milliGRAM(s) IV Intermittent every 24 hours  metoclopramide 5 milliGRAM(s) Oral two times a day  midazolam Infusion 0.5 mG/Hr (0.5 mL/Hr) IV Continuous <Continuous>  norepinephrine Infusion 0.5 MICROgram(s)/kG/Min (35.109 mL/Hr) IV Continuous <Continuous>  pantoprazole  Injectable 40 milliGRAM(s) IV Push daily  senna 1 Tablet(s) Oral two times a day        VITALS:  T(F): 97.7 (03-19-18 @ 08:00), Max: 98.8 (03-18-18 @ 16:00)  HR: 82 (03-19-18 @ 08:00)  BP: 154/76 (03-19-18 @ 08:00)  RR: 29 (03-19-18 @ 08:00)  SpO2: 100% (03-19-18 @ 08:00)  Wt(kg): --    03-17 @ 07:01  -  03-18 @ 07:00  --------------------------------------------------------  IN: 3025.1 mL / OUT: 825 mL / NET: 2200.1 mL    03-18 @ 07:01  -  03-19 @ 07:00  --------------------------------------------------------  IN: 1719 mL / OUT: 365 mL / NET: 1354 mL          PHYSICAL EXAM:  Constitutional: NAD  HEENT: anicteric sclera, oropharynx clear, MMM  Neck: No JVD  Respiratory: CTAB, no wheezes, rales or rhonchi  Cardiovascular: S1, S2, RRR  Gastrointestinal: BS+, soft, NT/ND  Extremities: No cyanosis or clubbing. No peripheral edema  :  No alva.   Skin: No rashes    LABS:  03-19    139  |  95<L>  |  147<HH>  ----------------------------<  87  5.8<H>   |  22  |  8.3<HH>    Creatinine Trend: 8.3<--, 7.5<--, 6.9<--, 6.1<--, 7.2<--, 8.1<--    Ca    6.6<L>      19 Mar 2018 05:19  Mg     2.4     03-19    TPro  6.0  /  Alb  1.9<L>  /  TBili  0.5  /  DBili      /  AST  19  /  ALT  12  /  AlkPhos  58  03-19                          7.4    9.65  )-----------( 78       ( 19 Mar 2018 05:19 )             22.8       Urine Studies:      RADIOLOGY & ADDITIONAL STUDIES: Nephrology progress note    Patient is seen and examined, events over the last 24 h noted .  still on MV   oligoanuric     Allergies:  No Known Allergies    Hospital Medications:   MEDICATIONS  (STANDING):  ALBUTerol    90 MICROgram(s) HFA Inhaler 1 Puff(s) Inhalation every 6 hours  calcium acetate 667 milliGRAM(s) Oral three times a day with meals  calcium carbonate 1250 mG + Vitamin D (OsCal 500 + D) 1 Tablet(s) Oral two times a day  chlorhexidine 0.12% Liquid 15 milliLiter(s) Swish and Spit two times a day  dexmedetomidine Infusion 0.2 MICROgram(s)/kG/Hr (3.745 mL/Hr) IV Continuous <Continuous>  dextrose 50% Injectable 50 milliLiter(s) IV Push once  erythromycin     base Tablet 250 milliGRAM(s) Oral every 12 hours  fentaNYL   Infusion 2 MICROgram(s)/kG/Hr (14.98 mL/Hr) IV Continuous <Continuous>  influenza   Vaccine 0.5 milliLiter(s) IntraMuscular once  ipratropium 17 MICROgram(s) HFA Inhaler 1 Puff(s) Inhalation every 6 hours  lactulose Syrup 20 Gram(s) Oral once     levoFLOXacin IVPB 250 milliGRAM(s) IV Intermittent every 24 hours  meropenem  IVPB 750 milliGRAM(s) IV Intermittent every 24 hours  metoclopramide 5 milliGRAM(s) Oral two times a day  midazolam Infusion 0.5 mG/Hr (0.5 mL/Hr) IV Continuous <Continuous>  norepinephrine Infusion 0.5 MICROgram(s)/kG/Min (35.109 mL/Hr) IV Continuous <Continuous>  pantoprazole  Injectable 40 milliGRAM(s) IV Push daily  senna 1 Tablet(s) Oral two times a day        VITALS:  T(F): 97.7 (03-19-18 @ 08:00), Max: 98.8 (03-18-18 @ 16:00)  HR: 82 (03-19-18 @ 08:00)  BP: 154/76 (03-19-18 @ 08:00)  RR: 29 (03-19-18 @ 08:00)  SpO2: 100% (03-19-18 @ 08:00)      03-17 @ 07:01  -  03-18 @ 07:00  --------------------------------------------------------  IN: 3025.1 mL / OUT: 825 mL / NET: 2200.1 mL    03-18 @ 07:01  -  03-19 @ 07:00  --------------------------------------------------------  IN: 1719 mL / OUT: 365 mL / NET: 1354 mL          PHYSICAL EXAM:  Constitutional: NAD  HEENT: anicteric sclera, oropharynx clear, MMM  Neck: No JVD  Respiratory: Crackles at base   Cardiovascular: S1, S2, RRR  Gastrointestinal: BS+, soft, NT/ND  Extremities: No cyanosis or clubbing. No peripheral edema  Skin: No rashes    LABS:  03-19    139  |  95<L>  |  147<HH>  ----------------------------<  87  5.8<H>   |  22  |  8.3<HH>    Creatinine Trend: 8.3<--, 7.5<--, 6.9<--, 6.1<--, 7.2<--, 8.1<--    Ca    6.6<L>      19 Mar 2018 05:19  Mg     2.4     03-19    TPro  6.0  /  Alb  1.9<L>  /  TBili  0.5  /  DBili      /  AST  19  /  ALT  12  /  AlkPhos  58  03-19                          7.4    9.65  )-----------( 78       ( 19 Mar 2018 05:19 )             22.8       Urine Studies:      RADIOLOGY & ADDITIONAL STUDIES:

## 2018-03-19 NOTE — PROGRESS NOTE ADULT - ASSESSMENT
ASSESSMENT/PLAN  Continue with current nutrition   peptamen af at 65ml/hr ASSESSMENT/PLAN  Continue with current nutrition   peptamen af at 65ml/hr  medical f/u noted

## 2018-03-20 NOTE — PROGRESS NOTE ADULT - SUBJECTIVE AND OBJECTIVE BOX
PGY I NOTE    LENGTH OF HOSPITAL STAY:  11d    Pt seen and examined at bedside. Briefly off sedation and was agitated. Followed commands and moved all extremities. Family considering terminal wean today. Will f/u and contact palliative    CHIEF COMPLAINT:Patient is a 67y old  Male who presents with a chief complaint of sob, weakness (09 Mar 2018 05:52)    HPI:HPI:  66 y/o M PMH Multiple Myeloma, Anemia who presents with weakness, SOB.  Patient had a dry cough and felt like he was sick for the past 2 weeks and this worsened over the past 2 days.  Patient is unable to walk without being extremely dyspneic , dyspnea gradually progressed to be at rest. Patient had chills in the last 2 days Patient is weak and not eating and not drinking well. Patient also has bone pain in his right flank.  Patient has not seen a doctor in numerous years and is not on medication.  Family felt he looked pale and weak. Patient denies any hematuria, melena or hematochezia. Patient is on iron tablets since 5 years, since then his stool is dark, with no recent change. Patient denies any alteration in bowl movements no nausea or vomiting, but there is decrease in PO intake. In the ER patient received one unit of blood , when he was received the second unit, he spiked a tem of 38.5, no change in hemodynamics, work up for transfusion reaction is sent. Patient was not taking medication or following up with any doctor cause he believes in herbal medicine only. (09 Mar 2018 05:52)    OVERNIGHT EVENTS/INTERVAL UPDATES:    PMH & PSH  PAST MEDICAL & SURGICAL HISTORY:  Anemia, unspecified type  Multiple myeloma, remission status unspecified    SOCIAL HISTORY: Negative    ALLERGIES: No Known Allergies    HOME MEDICATIONS  Home Medications:    PHYSICAL EXAM:  T(F): 99.1 (03-20-18 @ 08:00), Max: 99.3 (03-20-18 @ 04:00)  HR: 84 (03-20-18 @ 08:00)  BP: 145/72 (03-20-18 @ 08:00)  RR: 28 (03-20-18 @ 08:00)  SpO2: 100% (03-20-18 @ 08:00)  CAPILLARY BLOOD GLUCOSE  87 (20 Mar 2018 05:00)  173 (19 Mar 2018 19:00)  74 (19 Mar 2018 15:00)          03-19-18 @ 07:01  -  03-20-18 @ 07:00  --------------------------------------------------------  IN:    fentaNYL  Infusion: 365.5 mL    IV PiggyBack: 100 mL    midazolam Infusion: 66 mL    norepinephrine Infusion: 103 mL  Total IN: 634.5 mL    OUT:    Indwelling Catheter - Urethral: 245 mL  Total OUT: 245 mL    Total NET: 389.5 mL      03-20-18 @ 07:01  -  03-20-18 @ 10:10  --------------------------------------------------------  IN:    fentaNYL  Infusion: 10 mL    midazolam Infusion: 3 mL    norepinephrine Infusion: 6 mL  Total IN: 19 mL    OUT:  Total OUT: 0 mL    Total NET: 19 mL        Mode: AC/ CMV (Assist Control/ Continuous Mandatory Ventilation), RR (machine): 32, TV (machine): 450, FiO2: 40, PEEP: 7.5, ITime: 1, MAP: 19, PIP: 32    General: Mild distress off sedation   HEENT: NCAT  CV: RRR  RESP: CTAB  Abdominal: Scattered circular rash and diffuse macular papular rash on back  Extremity: no c/c/e  Neuro: Sedated    MEDICATIONS  STANDING MEDICATIONS  ALBUTerol    90 MICROgram(s) HFA Inhaler 1 Puff(s) Inhalation every 6 hours  calcium acetate 667 milliGRAM(s) Oral three times a day with meals  calcium carbonate 1250 mG + Vitamin D (OsCal 500 + D) 1 Tablet(s) Oral two times a day  chlorhexidine 0.12% Liquid 15 milliLiter(s) Swish and Spit two times a day  dexmedetomidine Infusion 0.2 MICROgram(s)/kG/Hr IV Continuous <Continuous>  dextrose 50% Injectable 50 milliLiter(s) IV Push once  erythromycin     base Tablet 250 milliGRAM(s) Oral every 12 hours  fentaNYL   Infusion 2 MICROgram(s)/kG/Hr IV Continuous <Continuous>  influenza   Vaccine 0.5 milliLiter(s) IntraMuscular once  ipratropium 17 MICROgram(s) HFA Inhaler 1 Puff(s) Inhalation every 6 hours  lactulose Syrup 20 Gram(s) Oral once  lactulose Syrup      levoFLOXacin IVPB 250 milliGRAM(s) IV Intermittent every 24 hours  meropenem  IVPB 750 milliGRAM(s) IV Intermittent every 24 hours  metoclopramide 5 milliGRAM(s) Oral two times a day  midazolam Infusion 0.5 mG/Hr IV Continuous <Continuous>  norepinephrine Infusion 0.5 MICROgram(s)/kG/Min IV Continuous <Continuous>  pantoprazole  Injectable 40 milliGRAM(s) IV Push daily  senna 1 Tablet(s) Oral two times a day    PRN MEDICATIONS    LABS:                        6.6    7.36  )-----------( 76       ( 20 Mar 2018 05:46 )             20.8              03-20    139  |  95<L>  |  118<HH>  ----------------------------<  80  5.5<H>   |  24  |  7.2<HH>    Ca    7.2<L>      20 Mar 2018 05:46  Mg     2.1     03-20    TPro  5.8<L>  /  Alb  1.8<L>  /  TBili  0.6  /  DBili  x   /  AST  16  /  ALT  10  /  AlkPhos  46  03-20    LIVER FUNCTIONS - ( 20 Mar 2018 05:46 )  Alb: 1.8 g/dL / Pro: 5.8 g/dL / ALK PHOS: 46 U/L / ALT: 10 U/L / AST: 16 U/L / GGT: x                      PT/INR - ( 20 Mar 2018 05:46 )   PT: 13.50 sec;   INR: 1.24 ratio         PTT - ( 20 Mar 2018 05:46 )  PTT:43.4 sec                  ABG - ( 20 Mar 2018 07:30 )  pH: 7.41  /  pCO2: 42    /  pO2: 89    / HCO3: 26    / Base Excess: 1.3   /  SaO2: 99

## 2018-03-20 NOTE — PROGRESS NOTE ADULT - PROVIDER SPECIALTY LIST ADULT
CCU
CT Surgery
Cardiology
Critical Care
Heme/Onc
Infectious Disease
Internal Medicine
Nephrology
Nutrition Support
Palliative Care
Palliative Care
Pulmonology
Pulmonology
Surgery
Surgery
CCU
Critical Care
Nephrology
Critical Care
Surgery
Surgery
Pulmonology

## 2018-03-20 NOTE — PROGRESS NOTE ADULT - SUBJECTIVE AND OBJECTIVE BOX
67yMale with diagnosis: ANEMIA;UNSPECIFIED TYPE;ACUTE RENAL FAILURE      Patient seen, chart reviewed, case d/w CCU team. No significant improvement in status. Patient remains vent dependent. After d/w family, decision made to withdraw vent support today in pursuit of comfort measures only.       PHYSICAL EXAM  Elderly man, orally intubated  PERRL  RRR  Abdom soft  No edema    T(C): , Max: 37.4 (04:00)  T(F): 99.1  HR: 67 (64 - 84)  BP: 107/59 (84/51 - 145/72)  RR: 32 (18 - 40)  SpO2: 100% (99% - 100%)              LABS:                          6.6    7.36  )-----------( 76       ( 20 Mar 2018 05:46 )             20.8                                                                                      03-20    139  |  95<L>  |  118<HH>  ----------------------------<  80  5.5<H>   |  24  |  7.2<HH>    Ca    7.2<L>      20 Mar 2018 05:46  Mg     2.1     03-20    TPro  5.8<L>  /  Alb  1.8<L>  /  TBili  0.6  /  DBili  x   /  AST  16  /  ALT  10  /  AlkPhos  46  03-20                                                      MEDICATIONS  (STANDING):  ALBUTerol    90 MICROgram(s) HFA Inhaler 1 Puff(s) Inhalation every 6 hours  calcium acetate 667 milliGRAM(s) Oral three times a day with meals  calcium carbonate 1250 mG + Vitamin D (OsCal 500 + D) 1 Tablet(s) Oral two times a day  chlorhexidine 0.12% Liquid 15 milliLiter(s) Swish and Spit two times a day  dexmedetomidine Infusion 0.2 MICROgram(s)/kG/Hr (3.745 mL/Hr) IV Continuous <Continuous>  dextrose 50% Injectable 50 milliLiter(s) IV Push once  erythromycin     base Tablet 250 milliGRAM(s) Oral every 12 hours  fentaNYL   Infusion 0.7 MICROgram(s)/kG/Hr (5.544 mL/Hr) IV Continuous <Continuous>  glycopyrrolate Injectable 0.4 milliGRAM(s) IV Push once  influenza   Vaccine 0.5 milliLiter(s) IntraMuscular once  ipratropium 17 MICROgram(s) HFA Inhaler 1 Puff(s) Inhalation every 6 hours  lactulose Syrup 20 Gram(s) Oral once  lactulose Syrup      levoFLOXacin IVPB 250 milliGRAM(s) IV Intermittent every 24 hours  meropenem  IVPB 750 milliGRAM(s) IV Intermittent every 24 hours  metoclopramide 5 milliGRAM(s) Oral two times a day  midazolam Infusion 0.5 mG/Hr (0.5 mL/Hr) IV Continuous <Continuous>  norepinephrine Infusion 0.5 MICROgram(s)/kG/Min (35.109 mL/Hr) IV Continuous <Continuous>  pantoprazole  Injectable 40 milliGRAM(s) IV Push daily  senna 1 Tablet(s) Oral two times a day    MEDICATIONS  (PRN):  fentaNYL    Injectable 50 MICROGram(s) IV Push every 10 minutes PRN Respiratory distress  LORazepam   Injectable 2 milliGRAM(s) IV Push every 30 minutes PRN Agitation

## 2018-03-20 NOTE — PROGRESS NOTE ADULT - RHONCHI
lower L/upper R
lower R/middle R
upper R/middle R
middle R/lower R/upper R/lower L

## 2018-03-20 NOTE — PROGRESS NOTE ADULT - SUBJECTIVE AND OBJECTIVE BOX
ARLEY COKER  67y, Male      OVERNIGHT EVENTS:    no fevers, sedated, non responsive.    VITALS:  T(F): 99.3, Max: 99.3 (03-20-18 @ 04:00)  HR: 72  BP: 112/63  RR: 32Vital Signs Last 24 Hrs  T(C): 37.4 (20 Mar 2018 04:00), Max: 37.4 (20 Mar 2018 04:00)  T(F): 99.3 (20 Mar 2018 04:00), Max: 99.3 (20 Mar 2018 04:00)  HR: 72 (20 Mar 2018 05:00) (66 - 86)  BP: 112/63 (20 Mar 2018 04:00) (84/51 - 154/76)  BP(mean): 75 (20 Mar 2018 04:00) (60 - 107)  RR: 32 (20 Mar 2018 05:00) (18 - 32)  SpO2: 100% (20 Mar 2018 05:00) (99% - 100%)    TESTS & MEASUREMENTS:                        7.4    9.65  )-----------( 78       ( 19 Mar 2018 05:19 )             22.8     03-19    139  |  95<L>  |  147<HH>  ----------------------------<  87  5.8<H>   |  22  |  8.3<HH>    Ca    6.6<L>      19 Mar 2018 05:19  Mg     2.4     03-19    TPro  6.0  /  Alb  1.9<L>  /  TBili  0.5  /  DBili  x   /  AST  19  /  ALT  12  /  AlkPhos  58  03-19    LIVER FUNCTIONS - ( 19 Mar 2018 05:19 )  Alb: 1.9 g/dL / Pro: 6.0 g/dL / ALK PHOS: 58 U/L / ALT: 12 U/L / AST: 19 U/L / GGT: x                   RADIOLOGY & ADDITIONAL TESTS:    ANTIBIOTICS:  erythromycin     base Tablet 250 milliGRAM(s) Oral every 12 hours  levoFLOXacin IVPB 250 milliGRAM(s) IV Intermittent every 24 hours  meropenem  IVPB 750 milliGRAM(s) IV Intermittent every 24 hours

## 2018-03-20 NOTE — PROVIDER CONTACT NOTE (MEDICATION) - SITUATION
Petechiae-like rashes all over pt's back and abdomen; also w/ blisters/papules. Dr. FRANCESCA Ball made aware and also assessed pt; wound culture done on blister and sent to lab.

## 2018-03-20 NOTE — PROGRESS NOTE ADULT - ASSESSMENT
67yMale being evaluated for severe pna w AHRF, known advanced  MM. No clinical improvement.    Met w patient's sister/decision maker- clinical condition understood. Decision made to withdraw vent support today. Family aware of likely progression to resp failure and death following vent withdrawal.  Following extubation: no further vasopressor support, no blood draws/fingersticks, no artificial hydration/nutrition.    DNR/DNI status in place.      Recommendations:  For vent withdrawal-  cont fentanyl ggt, plan to titrate to comfort.  cont versed as written, add ativan 2mg IVP q15min prn agitation  Robinul 0.4mg IVP  comfort measures thereafter  grave prognosis known

## 2018-03-20 NOTE — PROGRESS NOTE ADULT - ASSESSMENT
Multilobar PNA especially on the RUL and also on the left.  CXR no change.  S/P BAL with normal mini.  Levo 250 mg iv q24h.  Meropenem 750 mg iv [id number] PNA especially on the RUL and also on the left.  CXR no change.  S/P BAL with normal mini.  Levo 250 mg iv q24h.  Meropenem 750 mg iv q24h

## 2018-03-20 NOTE — PROGRESS NOTE ADULT - ASSESSMENT
67 year male patient with history of MM diagnosed in 2011, was on trial medication in [facility] for 3years, then did not seek any medical care thereafter presented for SOB, cough , generalized weakness and decreased po intake since 2 weeks.    #Septic Shock  2/2 PNA/ Acute hypoxic respiratory failure  - Intubated 3/9  - CXR unchanged; bilateral opacities.  - c/w Meropenem/Levoquin as per ID  - Cultures NGTD  - Flu negative  - G1DD on ECHO  - s/p Bronc 3/12  - BAL NGTD  - Urine legionella negative  - On low dose Levophed    # New rash  - likely drug reaction  - culture sent    #Multiple Myeloma/Renal failure/Hypocalcemia  - Nephro and Hem/onc following  - Ca supplement and Vit D for low Ca  - s/p four days of pulse Dexamethasone 40 MG IV per heme onc    #Anemia- Multifactorial   - Transfuse <7  - 2U PRBC if not weaned    # Thrombocytopenia/Elevated PT/PTT 2/2 Sepsis vs DIC/ HIT  - No overt bleeding  - No heparin products  - LE doppler negative for DVT   - if bleeding  and PT/PTT>1.5 UPN  can administer 10-15ML of FFP as per heme onc  - Monitor PT/PTT and Fibrinogen if bleeding  - May need a dose of Vitamin K oral  1-2.5mg to prevent K deficiency since on antibiotics    #GI  - C/w PO erythromycin at q12h    DVT ppx- SCD's  Poor prognosis         DNR now  discussed with family about poor prognosis

## 2018-03-20 NOTE — PROGRESS NOTE ADULT - NSHPATTENDINGPLANDISCUSS_GEN_ALL_CORE
CCU TEAM
ICU resident
Dr. Kellogg And ICU team
ICU fellow.
ICU resident
ICU resident in Detail

## 2018-03-20 NOTE — PROGRESS NOTE ADULT - ASSESSMENT
Assessment and Plan:   		  AHRF/ SEVERE PNA/ ADVANCED MM RENAL FAILURE S/P HD, SEVERE ANEMIA NO ACTIVE BLEED,FOLLOWING SIMPLE COMMANDS    CNS: Sedation vacation AS TOLERATED    PULM: possible terminal wean or weaning trial   NO EXTUBATION YET    CARDIO: I<O    RENAL: HD today      HEM ONC:  hem onc f up, transfuse 1 unit of PRBC   GI: Cont OG feeding  levo and bonita, follow up ID   cont senna and reglan    HEME /ONC FOLLOW UP   ID: Cont abx as per  ID RECOMMENDATION   Palliative care / hospice meeting  POOR PROGNOSIS  SPOKE AT LENGTH WITH FAMILY

## 2018-03-20 NOTE — GOALS OF CARE CONVERSATION - PERSONAL ADVANCE DIRECTIVE - CONVERSATION DETAILS
Patient' s diagnosis and prognosis discussed at length; family verbalizes understanding and have opted this time for palliative extubation and comfort measures.  All questions answered in detail.

## 2018-03-20 NOTE — PROGRESS NOTE ADULT - SUBJECTIVE AND OBJECTIVE BOX
Over Night Events:  Patient seen and examined started on pressors yesterday [id number]  still vented agitation when stop sedation       ROS:  See HPI    PHYSICAL EXAM    ICU Vital Signs Last 24 Hrs  T(C): 37.3 (20 Mar 2018 08:00), Max: 37.4 (20 Mar 2018 04:00)  T(F): 99.1 (20 Mar 2018 08:00), Max: 99.3 (20 Mar 2018 04:00)  HR: 84 (20 Mar 2018 08:00) (66 - 84)  BP: 145/72 (20 Mar 2018 08:00) (84/51 - 145/72)  BP(mean): 104 (20 Mar 2018 08:00) (60 - 105)  ABP: --  ABP(mean): --  RR: 28 (20 Mar 2018 08:00) (18 - 32)  SpO2: 100% (20 Mar 2018 08:00) (99% - 100%)      General: on sedation   HEENT:  ET tube               Lymph Nodes: NO cervical LN   Lungs: Bilateral BS  Cardiovascular: Regular   Abdomen: Soft, Positive BS  Extremities: No clubbing   Skin: rash   Neurological: move all ext     I&O's Detail    19 Mar 2018 07:01  -  20 Mar 2018 07:00  --------------------------------------------------------  IN:    fentaNYL  Infusion: 365.5 mL    IV PiggyBack: 100 mL    midazolam Infusion: 66 mL    norepinephrine Infusion: 103 mL  Total IN: 634.5 mL    OUT:    Indwelling Catheter - Urethral: 245 mL  Total OUT: 245 mL    Total NET: 389.5 mL      20 Mar 2018 07:01  -  20 Mar 2018 09:50  --------------------------------------------------------  IN:    fentaNYL  Infusion: 10 mL    midazolam Infusion: 3 mL    norepinephrine Infusion: 6 mL  Total IN: 19 mL    OUT:  Total OUT: 0 mL    Total NET: 19 mL          LABS:                          6.6    7.36  )-----------( 76       ( 20 Mar 2018 05:46 )             20.8         20 Mar 2018 05:46    139    |  95     |  118    ----------------------------<  80     5.5     |  24     |  7.2      Ca    7.2        20 Mar 2018 05:46  Mg     2.1       20 Mar 2018 05:46    TPro  5.8    /  Alb  1.8    /  TBili  0.6    /  DBili  x      /  AST  16     /  ALT  10     /  AlkPhos  46     20 Mar 2018 05:46  Amylase x     lipase x                                                 PT/INR - ( 20 Mar 2018 05:46 )   PT: 13.50 sec;   INR: 1.24 ratio         PTT - ( 20 Mar 2018 05:46 )  PTT:43.4 sec                                                                                                                                                Mode: AC/ CMV (Assist Control/ Continuous Mandatory Ventilation)  RR (machine): 32  TV (machine): 450  FiO2: 40  PEEP: 7.5  ITime: 1  MAP: 19  PIP: 32                                      ABG - ( 20 Mar 2018 07:30 )  pH: 7.41  /  pCO2: 42    /  pO2: 89    / HCO3: 26    / Base Excess: 1.3   /  SaO2: 99                  MEDICATIONS  (STANDING):  ALBUTerol    90 MICROgram(s) HFA Inhaler 1 Puff(s) Inhalation every 6 hours  calcium acetate 667 milliGRAM(s) Oral three times a day with meals  calcium carbonate 1250 mG + Vitamin D (OsCal 500 + D) 1 Tablet(s) Oral two times a day  chlorhexidine 0.12% Liquid 15 milliLiter(s) Swish and Spit two times a day  dexmedetomidine Infusion 0.2 MICROgram(s)/kG/Hr (3.745 mL/Hr) IV Continuous <Continuous>  dextrose 50% Injectable 50 milliLiter(s) IV Push once  erythromycin     base Tablet 250 milliGRAM(s) Oral every 12 hours  fentaNYL   Infusion 2 MICROgram(s)/kG/Hr (14.98 mL/Hr) IV Continuous <Continuous>  influenza   Vaccine 0.5 milliLiter(s) IntraMuscular once  ipratropium 17 MICROgram(s) HFA Inhaler 1 Puff(s) Inhalation every 6 hours  lactulose Syrup 20 Gram(s) Oral once  lactulose Syrup      levoFLOXacin IVPB 250 milliGRAM(s) IV Intermittent every 24 hours  meropenem  IVPB 750 milliGRAM(s) IV Intermittent every 24 hours  metoclopramide 5 milliGRAM(s) Oral two times a day  midazolam Infusion 0.5 mG/Hr (0.5 mL/Hr) IV Continuous <Continuous>  norepinephrine Infusion 0.5 MICROgram(s)/kG/Min (35.109 mL/Hr) IV Continuous <Continuous>  pantoprazole  Injectable 40 milliGRAM(s) IV Push daily  senna 1 Tablet(s) Oral two times a day    MEDICATIONS  (PRN):          Xrays:  TLC:  OG:  ET tube:   high   push ET tube in                                                                                  decrease b/l infiltrate    ECHO:

## 2018-03-20 NOTE — PROGRESS NOTE ADULT - SUBJECTIVE AND OBJECTIVE BOX
seen and examined  intubated/ventilated  on pressors         Standing Inpatient Medications  ALBUTerol    90 MICROgram(s) HFA Inhaler 1 Puff(s) Inhalation every 6 hours  calcium acetate 667 milliGRAM(s) Oral three times a day with meals  calcium carbonate 1250 mG + Vitamin D (OsCal 500 + D) 1 Tablet(s) Oral two times a day  chlorhexidine 0.12% Liquid 15 milliLiter(s) Swish and Spit two times a day  dexmedetomidine Infusion 0.2 MICROgram(s)/kG/Hr IV Continuous <Continuous>  dextrose 50% Injectable 50 milliLiter(s) IV Push once  erythromycin     base Tablet 250 milliGRAM(s) Oral every 12 hours  fentaNYL   Infusion 2 MICROgram(s)/kG/Hr IV Continuous <Continuous>  influenza   Vaccine 0.5 milliLiter(s) IntraMuscular once  ipratropium 17 MICROgram(s) HFA Inhaler 1 Puff(s) Inhalation every 6 hours  lactulose Syrup 20 Gram(s) Oral once  lactulose Syrup      levoFLOXacin IVPB 250 milliGRAM(s) IV Intermittent every 24 hours  meropenem  IVPB 750 milliGRAM(s) IV Intermittent every 24 hours  metoclopramide 5 milliGRAM(s) Oral two times a day  midazolam Infusion 0.5 mG/Hr IV Continuous <Continuous>  norepinephrine Infusion 0.5 MICROgram(s)/kG/Min IV Continuous <Continuous>  pantoprazole  Injectable 40 milliGRAM(s) IV Push daily  senna 1 Tablet(s) Oral two times a day    PRN Inpatient Medications      VITALS/PHYSICAL EXAM  --------------------------------------------------------------------------------  T(C): 37.2 (03-20-18 @ 06:00), Max: 37.4 (03-20-18 @ 04:00)  HR: 70 (03-20-18 @ 06:00) (66 - 86)  BP: 93/53 (03-20-18 @ 06:00) (84/51 - 142/76)  RR: 31 (03-20-18 @ 06:00) (18 - 32)  SpO2: 100% (03-20-18 @ 06:00) (99% - 100%)  Wt(kg): --    Weight (kg): 79.2 (03-20-18 @ 06:00)      03-19-18 @ 07:01  -  03-20-18 @ 07:00  --------------------------------------------------------  IN: 618.5 mL / OUT: 245 mL / NET: 373.5 mL      Physical Exam:  	Gen: intubated/ventilated  	Pulm: B/L nory   	CV:  S1S2; no rub  	Abd: distended  	LE:  no edema  	Vascular access: femoral udall     LABS/STUDIES  --------------------------------------------------------------------------------              6.6    7.36  >-----------<  76       [03-20-18 @ 05:46]              20.8     139  |  95  |  118  ----------------------------<  80      [03-20-18 @ 05:46]  5.5   |  24  |  7.2        Ca     7.2     [03-20-18 @ 05:46]      Mg     2.1     [03-20-18 @ 05:46]    TPro  5.8  /  Alb  1.8  /  TBili  0.6  /  DBili  x   /  AST  16  /  ALT  10  /  AlkPhos  46  [03-20-18 @ 05:46]    PT/INR: PT 13.50, INR 1.24       [03-20-18 @ 05:46]  PTT: 43.4       [03-20-18 @ 05:46]      Creatinine Trend:  SCr 7.2 [03-20 @ 05:46]  SCr 8.3 [03-19 @ 05:19]  SCr 7.5 [03-18 @ 16:46]  SCr 6.9 [03-18 @ 05:52]  SCr 6.1 [03-17 @ 18:50]          Iron 28, TIBC 135, %sat 21      [03-09-18 @ 11:03]  Ferritin 1559      [03-09-18 @ 11:03] seen and examined  intubated/ventilated  on pressors         Standing Inpatient Medications  ALBUTerol    90 MICROgram(s) HFA Inhaler 1 Puff(s) Inhalation every 6 hours  calcium acetate 667 milliGRAM(s) Oral three times a day with meals  calcium carbonate 1250 mG + Vitamin D (OsCal 500 + D) 1 Tablet(s) Oral two times a day  chlorhexidine 0.12% Liquid 15 milliLiter(s) Swish and Spit two times a day  dexmedetomidine Infusion 0.2 MICROgram(s)/kG/Hr IV Continuous <Continuous>  dextrose 50% Injectable 50 milliLiter(s) IV Push once  erythromycin     base Tablet 250 milliGRAM(s) Oral every 12 hours  fentaNYL   Infusion 2 MICROgram(s)/kG/Hr IV Continuous <Continuous>  influenza   Vaccine 0.5 milliLiter(s) IntraMuscular once  ipratropium 17 MICROgram(s) HFA Inhaler 1 Puff(s) Inhalation every 6 hours  lactulose Syrup 20 Gram(s) Oral once  lactulose Syrup      levoFLOXacin IVPB 250 milliGRAM(s) IV Intermittent every 24 hours  meropenem  IVPB 750 milliGRAM(s) IV Intermittent every 24 hours  metoclopramide 5 milliGRAM(s) Oral two times a day  midazolam Infusion 0.5 mG/Hr IV Continuous <Continuous>  norepinephrine Infusion 0.5 MICROgram(s)/kG/Min IV Continuous <Continuous>  pantoprazole  Injectable 40 milliGRAM(s) IV Push daily  senna 1 Tablet(s) Oral two times a day    PRN Inpatient Medications      VITALS/PHYSICAL EXAM  --------------------------------------------------------------------------------  T(C): 37.2 (03-20-18 @ 06:00), Max: 37.4 (03-20-18 @ 04:00)  HR: 70 (03-20-18 @ 06:00) (66 - 86)  BP: 93/53 (03-20-18 @ 06:00) (84/51 - 142/76)  RR: 31 (03-20-18 @ 06:00) (18 - 32)  SpO2: 100% (03-20-18 @ 06:00) (99% - 100%)  Wt(kg): --    Weight (kg): 79.2 (03-20-18 @ 06:00)      03-19-18 @ 07:01  -  03-20-18 @ 07:00  --------------------------------------------------------  IN: 618.5 mL / OUT: 245 mL / NET: 373.5 mL      Physical Exam:  	Gen: intubated/ventilated, rash   	Pulm: B/L nory   	CV:  S1S2; no rub  	Abd: distended  	LE:  no edema  	Vascular access: femoral udall     LABS/STUDIES  --------------------------------------------------------------------------------              6.6    7.36  >-----------<  76       [03-20-18 @ 05:46]              20.8     139  |  95  |  118  ----------------------------<  80      [03-20-18 @ 05:46]  5.5   |  24  |  7.2        Ca     7.2     [03-20-18 @ 05:46]      Mg     2.1     [03-20-18 @ 05:46]    TPro  5.8  /  Alb  1.8  /  TBili  0.6  /  DBili  x   /  AST  16  /  ALT  10  /  AlkPhos  46  [03-20-18 @ 05:46]    PT/INR: PT 13.50, INR 1.24       [03-20-18 @ 05:46]  PTT: 43.4       [03-20-18 @ 05:46]      Creatinine Trend:  SCr 7.2 [03-20 @ 05:46]  SCr 8.3 [03-19 @ 05:19]  SCr 7.5 [03-18 @ 16:46]  SCr 6.9 [03-18 @ 05:52]  SCr 6.1 [03-17 @ 18:50]          Iron 28, TIBC 135, %sat 21      [03-09-18 @ 11:03]  Ferritin 1559      [03-09-18 @ 11:03]

## 2018-03-20 NOTE — PROGRESS NOTE ADULT - ASSESSMENT
A 66 yo M with PMH of MM presented to hospital for SOB and weakness.     Severe MADISON on  CKD (unknown baseline Cr.)/ respiratory failure/anemia/hypocalcemia/MM  - remains oliguric, HD dependent, s/p hd yesterday with no UF   - etiology likely multiple myeloma - related kidney disease + ATN  -remains on pressors  - ID notes appreciated  - on levaquin and bonita  -transfuse 2 units of PRBC  -corrected calcium 8.8  - will follow

## 2018-03-21 DIAGNOSIS — N17.0 ACUTE KIDNEY FAILURE WITH TUBULAR NECROSIS: ICD-10-CM

## 2018-03-21 DIAGNOSIS — E87.2 ACIDOSIS: ICD-10-CM

## 2018-03-21 DIAGNOSIS — D65 DISSEMINATED INTRAVASCULAR COAGULATION [DEFIBRINATION SYNDROME]: ICD-10-CM

## 2018-03-21 DIAGNOSIS — A41.9 SEPSIS, UNSPECIFIED ORGANISM: ICD-10-CM

## 2018-03-21 DIAGNOSIS — J18.9 PNEUMONIA, UNSPECIFIED ORGANISM: ICD-10-CM

## 2018-03-21 DIAGNOSIS — D63.1 ANEMIA IN CHRONIC KIDNEY DISEASE: ICD-10-CM

## 2018-03-21 DIAGNOSIS — R65.21 SEVERE SEPSIS WITH SEPTIC SHOCK: ICD-10-CM

## 2018-03-21 DIAGNOSIS — E83.52 HYPERCALCEMIA: ICD-10-CM

## 2018-03-21 DIAGNOSIS — D72.819 DECREASED WHITE BLOOD CELL COUNT, UNSPECIFIED: ICD-10-CM

## 2018-03-21 DIAGNOSIS — E87.5 HYPERKALEMIA: ICD-10-CM

## 2018-03-21 DIAGNOSIS — C90.00 MULTIPLE MYELOMA NOT HAVING ACHIEVED REMISSION: ICD-10-CM

## 2018-03-21 DIAGNOSIS — D63.0 ANEMIA IN NEOPLASTIC DISEASE: ICD-10-CM

## 2018-03-21 DIAGNOSIS — K31.84 GASTROPARESIS: ICD-10-CM

## 2018-03-21 DIAGNOSIS — R21 RASH AND OTHER NONSPECIFIC SKIN ERUPTION: ICD-10-CM

## 2018-03-21 DIAGNOSIS — Z51.5 ENCOUNTER FOR PALLIATIVE CARE: ICD-10-CM

## 2018-03-21 DIAGNOSIS — J96.01 ACUTE RESPIRATORY FAILURE WITH HYPOXIA: ICD-10-CM

## 2018-04-11 LAB
CULTURE RESULTS: SIGNIFICANT CHANGE UP
SPECIMEN SOURCE: SIGNIFICANT CHANGE UP

## 2020-02-18 NOTE — DIETITIAN INITIAL EVALUATION ADULT. - ENERGY NEEDS
estimated kcal needs: 1880kcal/d (PSE 2003b)  estimated protein needs:90-112gm/d (1.2-1.5gm/kg act weight), pt is now on HD for MADISON, please reassess protein needs as needed  estimated fluid needs: per nephrology/CCU team refuses to cooperate

## 2020-06-30 NOTE — GOALS OF CARE CONVERSATION - PERSONAL ADVANCE DIRECTIVE - TREATMENT GUIDELINES
Comfort measures only/No antibiotics/No blood draws/No artificial nutrition/DNR Order/No IV fluids complains of pain/discomfort

## 2021-01-27 NOTE — DISCHARGE NOTE FOR THE EXPIRED PATIENT - HOSPITAL COURSE
10-Feb-2021
66 y/o M PMH Multiple Myeloma ( was  being treated with holistics approach, Anemia who presented  with weakness/ SOB for past 2 weeks , worsened over the past 2 days, admitted with diagnosis of sepsis due to multifocal pneumonia/ acute hypoxemic respiratory failure requiring intubation.    #Septic Shock  2/2 PNA/ Acute hypoxic respiratory failure  -#History of Multiple Myeloma now in Severe Renal failure/ Metabolic acidosis, the renal failure maybe multifactorial, sepsis and myeloma  -CTs did show lytic lesions < from: CT Chest No Cont (03.09.18 @ 00:54) >  Osseous lucent lesions and overall demineralization compatible patient's  known multiple myeloma.  -SPEP Mspike was 2.9 Immunoglobulin Free Light Chains, Serum (03.09.18 @ 17:13)    Nichols/Lambda Free Light Chain Ratio, Serum: 777.50 Ratio    NANI Kappa: 311.00 mg/dL    NANI Lambda: 0.40 mg/dL  -will not offer any further treatment at this time, it most likely will not be of any benefit if he does not recover and he may have not wanted it.    #Anemia/ multifactorial   -stable HgB Multiple myeloma/acute illness with bone marrow suppression,  anemia in CKD,     #lEvated pt/PTT possibly from  K deficency his sepsis has resolved  -#Mild Hypercalcemia resolved     #Elevated Bili and AST  -resolved    #New thrombocytopenia  now moderate this is multifactorial and is improving  - Hep SC stopped   and this  started about  day 5. I doubt HIT the drop is not 50% and he has multiple other causes, infection, DIC,  medication ,HIT serology panel pending but would not start DTI. His Dopplers 3/13 negative and he maybe bleeding  -keep plts >50,000 if bleeding or >10,000 if not. If overt DIC and bleount of >90,000  -once HIT panel is back and if negative restart Hep SC for DVT ppx    #Black OG secretions due to GI bleed? Medications? Old feeds? resolved  -most likely was not GI bleed      Prognosis is poor. Family is aware.     palliative on board   DNR/DNI  pt was terminally weaned as per family wishes

## 2021-07-20 NOTE — AIRWAY PLACEMENT NOTE ADULT - DATE /TIME:
09-Mar-2018 08:30 Health Maintenance Due   Topic Date Due   • Shingles Vaccine (1 of 2) Never done       Patient is due for topics as listed above but is not proceeding with Immunization(s) Shingles at this time.

## 2023-10-27 NOTE — CONSULT NOTE ADULT - ASSESSMENT
IMPRESSION:  acute hypoxemic resp failure secomndary to PNA, immunocompromised pt  MM not on tx  renal failure  anemia    PLAN:    CNS: sedate to RAAS -2, -3    HEENT:  Oral care    PULMONARY:  HOB @ 45 degrees. intubate.    CARDIOVASCULAR: even fluid balance. check trop. check 2d echo    GI: GI prophylaxis                                          Feeding npo, for today    RENAL:  F/u  lytes.  Correct as needed     INFECTIOUS DISEASE: pan cultures. check LDH    HEMATOLOGICAL:  DVT prophylaxis. heparin sq    ENDOCRINE:  Follow up FS.  Insulin protocol if needed.    MUSCULOSKELETAL:    CODE STATUS: FULL CODE    DISPOSITION: Pt requires continued monitoring in the MICU. prognosis guarded impaired

## 2024-03-03 NOTE — PROCEDURE NOTE - NSCVLACTUALSITE_VASC_A_CORE
Rest, fluids, use medications as directed  Please follow up with Dentist as planned or make an appointment with dentist for further evaluation and treatment  Augmentin 875 mg 1 tab 2 times a day for 10 days  Ibuprofen 800 mg every 8 hours as needed for pain daily for pain and inflammation (take with food, monitor blood pressure this medication may elevate blood pressure. Do not take any other NSAIDs with medication. You may take Tylenol with medication)  Alternate Ibuprofen with Tylenol for pain  Hydrogen Peroxide mixed with water 50/50 swishes and spit to cleanse mouth.  Go to ER if worsen, I.e. fever, vomiting, facial swelling, drainage, increasing pain.    internal jugular